# Patient Record
Sex: FEMALE | Race: WHITE | NOT HISPANIC OR LATINO | Employment: OTHER | URBAN - METROPOLITAN AREA
[De-identification: names, ages, dates, MRNs, and addresses within clinical notes are randomized per-mention and may not be internally consistent; named-entity substitution may affect disease eponyms.]

---

## 2022-04-26 ENCOUNTER — OFFICE VISIT (OUTPATIENT)
Dept: INTERNAL MEDICINE | Facility: CLINIC | Age: 72
End: 2022-04-26
Payer: COMMERCIAL

## 2022-04-26 VITALS
SYSTOLIC BLOOD PRESSURE: 136 MMHG | HEART RATE: 88 BPM | DIASTOLIC BLOOD PRESSURE: 84 MMHG | WEIGHT: 228.1 LBS | HEIGHT: 63 IN | BODY MASS INDEX: 40.41 KG/M2 | RESPIRATION RATE: 18 BRPM | TEMPERATURE: 97.1 F | OXYGEN SATURATION: 95 %

## 2022-04-26 DIAGNOSIS — R60.0 BILATERAL LEG EDEMA: ICD-10-CM

## 2022-04-26 DIAGNOSIS — E66.01 CLASS 3 SEVERE OBESITY WITHOUT SERIOUS COMORBIDITY WITH BODY MASS INDEX (BMI) OF 40.0 TO 44.9 IN ADULT, UNSPECIFIED OBESITY TYPE (H): ICD-10-CM

## 2022-04-26 DIAGNOSIS — R21 RASH AND NONSPECIFIC SKIN ERUPTION: ICD-10-CM

## 2022-04-26 DIAGNOSIS — Z12.31 ENCOUNTER FOR SCREENING MAMMOGRAM FOR BREAST CANCER: ICD-10-CM

## 2022-04-26 DIAGNOSIS — Z12.11 SPECIAL SCREENING FOR MALIGNANT NEOPLASMS, COLON: ICD-10-CM

## 2022-04-26 DIAGNOSIS — N93.9 VAGINAL BLEEDING: ICD-10-CM

## 2022-04-26 DIAGNOSIS — E66.813 CLASS 3 SEVERE OBESITY WITHOUT SERIOUS COMORBIDITY WITH BODY MASS INDEX (BMI) OF 40.0 TO 44.9 IN ADULT, UNSPECIFIED OBESITY TYPE (H): ICD-10-CM

## 2022-04-26 DIAGNOSIS — Z00.00 ROUTINE GENERAL MEDICAL EXAMINATION AT A HEALTH CARE FACILITY: Primary | ICD-10-CM

## 2022-04-26 LAB
ERYTHROCYTE [DISTWIDTH] IN BLOOD BY AUTOMATED COUNT: 13.2 % (ref 10–15)
HCT VFR BLD AUTO: 41.1 % (ref 35–47)
HGB BLD-MCNC: 13.1 G/DL (ref 11.7–15.7)
MCH RBC QN AUTO: 30 PG (ref 26.5–33)
MCHC RBC AUTO-ENTMCNC: 31.9 G/DL (ref 31.5–36.5)
MCV RBC AUTO: 94 FL (ref 78–100)
PLATELET # BLD AUTO: 192 10E3/UL (ref 150–450)
RBC # BLD AUTO: 4.37 10E6/UL (ref 3.8–5.2)
WBC # BLD AUTO: 3.2 10E3/UL (ref 4–11)

## 2022-04-26 PROCEDURE — 80053 COMPREHEN METABOLIC PANEL: CPT | Performed by: INTERNAL MEDICINE

## 2022-04-26 PROCEDURE — 84443 ASSAY THYROID STIM HORMONE: CPT | Performed by: INTERNAL MEDICINE

## 2022-04-26 PROCEDURE — 80061 LIPID PANEL: CPT | Performed by: INTERNAL MEDICINE

## 2022-04-26 PROCEDURE — 85027 COMPLETE CBC AUTOMATED: CPT | Performed by: INTERNAL MEDICINE

## 2022-04-26 PROCEDURE — 99213 OFFICE O/P EST LOW 20 MIN: CPT | Mod: 25 | Performed by: INTERNAL MEDICINE

## 2022-04-26 PROCEDURE — 36415 COLL VENOUS BLD VENIPUNCTURE: CPT | Performed by: INTERNAL MEDICINE

## 2022-04-26 PROCEDURE — 99387 INIT PM E/M NEW PAT 65+ YRS: CPT | Performed by: INTERNAL MEDICINE

## 2022-04-26 RX ORDER — HYDROCHLOROTHIAZIDE 12.5 MG/1
12.5 CAPSULE ORAL EVERY MORNING
Qty: 30 CAPSULE | Refills: 1 | Status: SHIPPED | OUTPATIENT
Start: 2022-04-26 | End: 2022-11-18

## 2022-04-26 RX ORDER — HYDROCORTISONE 2.5 %
CREAM (GRAM) TOPICAL 2 TIMES DAILY
Qty: 30 G | Refills: 1 | Status: SHIPPED | OUTPATIENT
Start: 2022-04-26 | End: 2023-07-20

## 2022-04-26 ASSESSMENT — ENCOUNTER SYMPTOMS
BREAST MASS: 0
ABDOMINAL PAIN: 1
CHILLS: 1
HEADACHES: 1
ARTHRALGIAS: 1
HEMATURIA: 1
EYE PAIN: 1
MYALGIAS: 1
WEAKNESS: 1

## 2022-04-26 ASSESSMENT — ACTIVITIES OF DAILY LIVING (ADL): CURRENT_FUNCTION: TELEPHONE REQUIRES ASSISTANCE

## 2022-04-26 NOTE — PROGRESS NOTES
Dr Gordillo's note    Patient's instructions / PLAN:                                                        Plan:  1. hydrochlorathiazide 12.5 mg every morning for swollen legs  2. Pelvic ultrasound -- To schedule this test you may call Scheduling center at [phone]    3. Schedule appointment with the OBGYN after the ultrasound   4. Hydrocortisone cream twice a day to the rash on the back  5. Referral to dermatology   6.  Labs today - [address]   7. Mammogram ( please call [phone] to schedule it)   8. The following vaccines are recommended for you. Please check with your insurance about coverage. Please check with your doctor in [place] and find out which vaccine you had. Some insurances cover better if you have these vaccines at the pharmacy:  -- Prevnar 13 ( pneumonia vaccine)  -- Pneumovax 23 ( different pneumonia vaccine ) - it will be done 1 year after the Prevnar vaccine   -- Tetanus vaccine with whooping cough  -- Shingerix vaccine - the newest vaccine for shingles   -- flu vaccine          ASSESSMENT & PLAN:                                                      (Z00.00) Routine general medical examination at a health care facility  (primary encounter diagnosis)  Comment:   Plan: CBC with platelets, Comprehensive metabolic         panel, Lipid panel reflex to direct LDL         Fasting, TSH with free T4 reflex            (N93.9) Vaginal bleeding  Comment:   Plan: CBC with platelets, Comprehensive metabolic         panel, Lipid panel reflex to direct LDL         Fasting, TSH with free T4 reflex, US Pelvic         Complete with Transvaginal            (R60.0) Bilateral leg edema  Comment:   We discussed about the new meds, advantages and potential side effects. The patient will read also the info from the pharmacy and call back if questions.   Plan: CBC with platelets, Comprehensive metabolic         panel, TSH with free T4 reflex,         hydrochlorothiazide (MICROZIDE) 12.5 MG capsule            (R21)  Rash and nonspecific skin eruption  Comment:   Plan: hydrocortisone 2.5 % cream, Adult Dermatology         Referral            (Z12.31) Encounter for screening mammogram for breast cancer  Comment:   Plan: MA Screening Digital Bilateral            (Z12.11) Special screening for malignant neoplasms, colon  Comment:   Plan: COLOGUAAPPLE([employer])          (E66.01,  Z68.41) Class 3 severe obesity without serious comorbidity with body mass index (BMI) of 40.0 to 44.9 in adult, unspecified obesity type (H)  Comment:   Plan:  we discussed about diet ( reducing calories intake) and increasing the exercise              Chief Complaint:                                                        Annual exam  Due to language barrier, an  was present on the phone  during the history-taking and subsequent discussion (and for part of the physical exam) with this patient.     SUBJECTIVE:                                                    History of present illness     We reviewed the chronic medical problems as above.   I reviewed the recent tests results in Epic     Last visit to the doctor was in [place] in 2019    She c/o of itching on the back     Family memebr shows a picture with rash on the upper back     ROS:      See below       PMHx: - reviewed  History reviewed. No pertinent past medical history.   Patient denies    PSHx: reviewed  History reviewed. No pertinent surgical history.   Patient denies any surgery    Soc Hx: No daily alcohol, no smoking  Social History     Socioeconomic History     Marital status: Single     Spouse name: Not on file     Number of children: Not on file     Years of education: Not on file     Highest education level: Not on file   Occupational History     Not on file   Tobacco Use     Smoking status: Never Smoker     Smokeless tobacco: Never Used   Vaping Use     Vaping Use: Never used   Substance and Sexual Activity     Alcohol use: Never     Drug use: Never     Sexual activity: Never  "  Other Topics Concern     Not on file   Social History Narrative     Not on file     Social Determinants of Health     Financial Resource Strain: Not on file   Food Insecurity: Not on file   Transportation Needs: Not on file   Physical Activity: Not on file   Stress: Not on file   Social Connections: Not on file   Intimate Partner Violence: Not on file   Housing Stability: Not on file        Fam Hx: reviewed  History reviewed. No pertinent family history.      Screening: reviewed      All: reviewed    Meds: reviewed  No current outpatient medications on file.       OBJECTIVE:                                                    Physical Exam :   Blood pressure 136/84, pulse 88, temperature 97.1  F (36.2  C), temperature source Tympanic, resp. rate 18, height 1.6 m (5' 3\"), weight 103.5 kg (228 lb 1.6 oz), SpO2 95 %.     NAD, appears comfortable  Skin clear, no rashes  Neck: supple, no JVD,  no thyroidmegaly  Lymph nodes non palpable in the cervical, supraclavicular axillaries,   Chest: clear to auscultation with good respiratory effort  Cardiac: S1S2, RRR, no mgr appreciated  Abdomen: soft, not tender, not distended, audible bowel sound, no hepatosplenomegaly, no palpable masses, no abdominal bruits  Extremities: no cyanosis, clubbing or edema.   Neuro: A, Ox3, no focal signs.          Nelli Gordillo MD  Internal Medicine         SUBJECTIVE:   Nereyda Rosa is a 72 year old female who presents for Preventive Visit.      Patient has been advised of split billing requirements and indicates understanding: Yes  Are you in the first 12 months of your Medicare coverage?  No    Healthy Habits:     In general, how would you rate your overall health?  Good    Frequency of exercise:  2-3 days/week    Duration of exercise:  N/A    Do you usually eat at least 4 servings of fruit and vegetables a day, include whole grains    & fiber and avoid regularly eating high fat or \"junk\" foods?  No    Taking medications regularly:  Yes    " Medication side effects:  None    Ability to successfully perform activities of daily living:  Telephone requires assistance    Home Safety:  No safety concerns identified    Hearing Impairment:  Difficulty understanding soft or whispered speech    In the past 6 months, have you been bothered by leaking of urine?  No    In general, how would you rate your overall mental or emotional health?  Excellent      PHQ-2 Total Score: 0    Additional concerns today:  No    Do you feel safe in your environment? Yes    Have you ever done Advance Care Planning? (For example, a Health Directive, POLST, or a discussion with a medical provider or your loved ones about your wishes): No, advance care planning information given to patient to review.  Patient declined advance care planning discussion at this time.       Fall risk  Fallen 2 or more times in the past year?: No  Any fall with injury in the past year?: No  Cognitive Screening LANGUAGE BARRIER  1) Repeat 3 items (Leader, Season, Table)    2) Clock draw: LANGUAGE BARRIER  3) 3 item recall: Recalls 1 object   Results: .    Mini-CogTM Copyright FLO Hernandez. Licensed by the author for use in [facility]; reprinted with permission (jessi@[facility]). All rights reserved.      Do you have sleep apnea, excessive snoring or daytime drowsiness?: yes    Reviewed and updated as needed this visit by clinical staff                    Reviewed and updated as needed this visit by Provider                   Social History     Tobacco Use     Smoking status: Not on file     Smokeless tobacco: Not on file   Substance Use Topics     Alcohol use: Not on file         No flowsheet data found.            Current providers sharing in care for this patient include:   Patient Care Team:  No Ref-Primary, Physician as PCP - General    The following health maintenance items are reviewed in Epic and correct as of today:  Health Maintenance Due   Topic Date Due     DEXA  Never done     ANNUAL REVIEW  OF HM ORDERS  Never done     ADVANCE CARE PLANNING  Never done     MAMMO SCREENING  Never done     COVID-19 Vaccine (1) Never done     COLORECTAL CANCER SCREENING  Never done     HEPATITIS C SCREENING  Never done     DTAP/TDAP/TD IMMUNIZATION (1 - Tdap) Never done     LIPID  Never done     ZOSTER IMMUNIZATION (1 of 2) Never done     MEDICARE ANNUAL WELLNESS VISIT  Never done     FALL RISK ASSESSMENT  Never done     Pneumococcal Vaccine: 65+ Years (1 of 1 - PPSV23) Never done     INFLUENZA VACCINE (1) Never done     PHQ-2 (once per calendar year)  Never done     Labs reviewed in EPIC          Review of Systems   Constitutional: Positive for chills.   Eyes: Positive for pain.   Cardiovascular: Positive for peripheral edema.   Gastrointestinal: Positive for abdominal pain.   Breasts:  Negative for tenderness, breast mass and discharge.   Genitourinary: Positive for hematuria, pelvic pain and vaginal bleeding. Negative for vaginal discharge.   Musculoskeletal: Positive for arthralgias and myalgias.   Skin: Positive for rash.   Neurological: Positive for weakness and headaches.         Patient has been advised of split billing requirements and indicates understanding: Yes At the check in, at the      COUNSELING:  Reviewed preventive health counseling, as reflected in patient instructions       Regular exercise       Healthy diet/nutrition    There is no height or weight on file to calculate BMI.    Weight management plan: Discussed healthy diet and exercise guidelines    She has no history on file for tobacco use.      Appropriate preventive services were discussed with this patient, including applicable screening as appropriate for cardiovascular disease, diabetes, osteopenia/osteoporosis, and glaucoma.  As appropriate for age/gender, discussed screening for colorectal cancer, prostate cancer, breast cancer, and cervical cancer. Checklist reviewing preventive services available has been given to the  patient.    Reviewed patients plan of care and provided an AVS. The Basic Care Plan (routine screening as documented in Health Maintenance) for Nereyda meets the Care Plan requirement. This Care Plan has been established and reviewed with the Patient.    Counseling Resources:  ATP IV Guidelines  Pooled Cohorts Equation Calculator  Breast Cancer Risk Calculator  Breast Cancer: Medication to Reduce Risk  FRAX Risk Assessment  ICSI Preventive Guidelines  Dietary Guidelines for Americans, 2010  USDA's MyPlate  ASA Prophylaxis  Lung CA Screening    Nelli Conner MD  [facility]    Identified Health Risks:

## 2022-04-26 NOTE — PATIENT INSTRUCTIONS
Plan:  1. hydrochlorathiazide 12.5 mg every morning for swollen legs  2. Pelvic ultrasound -- To schedule this test you may call Scheduling center at [phone]    3. Schedule appointment with the OBGYN after the ultrasound   4. Hydrocortisone cream twice a day to the rash on the back  5. Referral to dermatology   6.  Labs today - [address]   7. Mammogram ( please call [phone] to schedule it)   8. The following vaccines are recommended for you. Please check with your insurance about coverage. Please check with your doctor in [place] and find out which vaccine you had. Some insurances cover better if you have these vaccines at the pharmacy:  -- Prevnar 13 ( pneumonia vaccine)  -- Pneumovax 23 ( different pneumonia vaccine ) - it will be done 1 year after the Prevnar vaccine   -- Tetanus vaccine with whooping cough  -- Shingerix vaccine - the newest vaccine for shingles   -- flu vaccine

## 2022-04-27 LAB
ALBUMIN SERPL-MCNC: 3.8 G/DL (ref 3.4–5)
ALP SERPL-CCNC: 84 U/L (ref 40–150)
ALT SERPL W P-5'-P-CCNC: 19 U/L (ref 0–50)
ANION GAP SERPL CALCULATED.3IONS-SCNC: 4 MMOL/L (ref 3–14)
AST SERPL W P-5'-P-CCNC: 17 U/L (ref 0–45)
BILIRUB SERPL-MCNC: 0.4 MG/DL (ref 0.2–1.3)
BUN SERPL-MCNC: 16 MG/DL (ref 7–30)
CALCIUM SERPL-MCNC: 9 MG/DL (ref 8.5–10.1)
CHLORIDE BLD-SCNC: 106 MMOL/L (ref 94–109)
CHOLEST SERPL-MCNC: 198 MG/DL
CO2 SERPL-SCNC: 28 MMOL/L (ref 20–32)
CREAT SERPL-MCNC: 0.69 MG/DL (ref 0.52–1.04)
FASTING STATUS PATIENT QL REPORTED: YES
GFR SERPL CREATININE-BSD FRML MDRD: >90 ML/MIN/1.73M2
GLUCOSE BLD-MCNC: 116 MG/DL (ref 70–99)
HDLC SERPL-MCNC: 53 MG/DL
LDLC SERPL CALC-MCNC: 126 MG/DL
NONHDLC SERPL-MCNC: 145 MG/DL
POTASSIUM BLD-SCNC: 4 MMOL/L (ref 3.4–5.3)
PROT SERPL-MCNC: 7.4 G/DL (ref 6.8–8.8)
SODIUM SERPL-SCNC: 138 MMOL/L (ref 133–144)
TRIGL SERPL-MCNC: 96 MG/DL
TSH SERPL DL<=0.005 MIU/L-ACNC: 0.92 MU/L (ref 0.4–4)

## 2022-05-25 LAB — NONINV COLON CA DNA+OCC BLD SCRN STL QL: NEGATIVE

## 2022-07-07 NOTE — PROGRESS NOTES
[facility] Note - EP    Encounter Date: Jul 8, 2022    Dermatology Problem List:    # Xerosis cutis  - Triamcinolone 0.1% cream BID PRN + sensitive skin care techniques  # Notalgia paresthetica  - Sarna Q6H PRN + exercises and possibly PT    ____________________________________________    Assessment & Plan:     # Xerosis cutis with mild atopic dermatitis  Chronic, uncomplicated, expected to improve with sensitive skin care routine and TCS PRN.  - counseled on etiology and natural history of condition  - counseled regarding care of xerotic, sensitive, and/or dermatitic/eczematous skin - see handout in AVS  - start triamcinolone 0.1% cream BID to most pruritic areas, tapering with improvement    # Notalgia Paresthetica  - Counseled regarding unclear etiology, chronic but benign nature, and natural history of condition  - Sarna Q6H PRN to pruritic area between scapulae  - Reviewed exercises which reinforce good posture 5-10 minutes per day for 3 weeks  - Recommended exploring physical therapy, which is known to help with this condition, and she will discuss with her PCP    Procedures Performed:   None    Follow-up: 1 year or sooner PRN for new or changing skin concerns.    Scribe Disclosure:  I, Leslie Rosendo, am serving as a scribe to document services personally performed by Jones Haley MD based on data collection and the provider's statements to me.     Staffed with Dr. Haley.    Bob Dickinson MD  Medicine/Dermatology PGY-2  *[phone]    Staff Physician Comments:   I saw and evaluated the patient with the resident and I agree with the assessment and plan.  I was present for the examination. I have made edits if needed.    Jones Haley MD  Staff Dermatologist and Dermatopathologist  , Department of Dermatology   ____________________________________________    CC: Rash (All over body.)      HPI:  Ms. Nereyda Rosa is a(n) extremely pleasant 72 year old female who  presents today as a new patient for a rash.    Belarusian  utilized throughout, and patient's daughter (bilingual) also present to assist with interpretation, history, and review of AVS instructions.    - Pruritus started 10-11/2022 in center of upper back, then spread to radial flexor surface of bilateral wrists (L>R). Also less involved are popliteal fossae, abdomen. Itch starts first, then rash appears. Notes also dry and flaky skin.  - Notes that she moved to the area from [place] 7/2021, this was her first winter in [place]  - Skin care routine: hot showers daily, washes whole body surface with soap, unsure what soap she uses, uses vaseline to whole body once daily before bed though usually showers in the morning  - Has tried treating pruritic areas with hydrocortisone 2.5% cream from PCP which has been helpful  - Never had anything like this happen before, no similar symptoms in close contacts, no recent changes to bathing or laundry products, clothing, environmental exposures     The patient denies any painful, bleeding, or nonhealing lesions, or any new or changing moles.    Patient is otherwise feeling well, without additional skin concerns.     Labs Reviewed:  N/A    Physical Exam:  Vitals: There were no vitals taken for this visit.  SKIN: Total skin excluding the undergarment areas was performed. The exam included the head/face, neck, both arms, chest, back, abdomen, both legs, digits and/or nails.   - Erythema and lichenification bilateral medial surface of flexor wrists  - Excoriations without notable lesions otherwise on center upper back at midpoint between scapulae  - Excoriations, xerosis, light scaling to bilateral calves, abdomen, upper arms  - No other lesions of concern on areas examined.     Medications:  Current Outpatient Medications   Medication     hydrochlorothiazide (MICROZIDE) 12.5 MG capsule     hydrocortisone 2.5 % cream     No current facility-administered medications for this  visit.      Past Medical History:   There is no problem list on file for this patient.    No past medical history on file.    CC Nelli Conner MD  [address] [place] [address] on close of this encounter.    This note has been created using voice recognition software; while it has been reviewed, some errors may persist.

## 2022-07-08 ENCOUNTER — OFFICE VISIT (OUTPATIENT)
Dept: FAMILY MEDICINE | Facility: CLINIC | Age: 72
End: 2022-07-08
Attending: INTERNAL MEDICINE
Payer: COMMERCIAL

## 2022-07-08 DIAGNOSIS — L85.3 XEROSIS CUTIS: ICD-10-CM

## 2022-07-08 DIAGNOSIS — R21 RASH AND NONSPECIFIC SKIN ERUPTION: ICD-10-CM

## 2022-07-08 DIAGNOSIS — R20.2 NOTALGIA PARESTHETICA: Primary | ICD-10-CM

## 2022-07-08 PROCEDURE — 99204 OFFICE O/P NEW MOD 45 MIN: CPT | Mod: GC | Performed by: DERMATOLOGY

## 2022-07-08 RX ORDER — TRIAMCINOLONE ACETONIDE 1 MG/G
CREAM TOPICAL 2 TIMES DAILY
Qty: 453.6 G | Refills: 4 | Status: SHIPPED | OUTPATIENT
Start: 2022-07-08 | End: 2023-07-20

## 2022-07-08 ASSESSMENT — PAIN SCALES - GENERAL: PAINLEVEL: MILD PAIN (2)

## 2022-07-08 NOTE — PATIENT INSTRUCTIONS
---------------------------------------    Notalgia Paresthetica  This is a very common condition that is characterized by itching, tingling or an altered sense of touch on one side of the back, most frequently the left shoulder blade area. Sometimes this itch can occur come-and-go; other times it is constant. The cause of this is unclear, but it is thought that it is due to the sensory nerves in the back sending out itchy signals, perhaps as a result of poor posture which lead to abnormal stretching of the nerves.  Anti-itch creams, like Sarna, or numbing creams, like pramoxine or Sarna Sensitive, can help to make the sensations go away. Sometimes prescriptions are necessary.  Exercises which reinforce good posture (like the those below) are also helpful. You can do these exercises at home by squeezing your shoulder blades together and stretching your chest (see pictures below). These exercises should be done for 5-10 minutes per day for 3 weeks. Try as well to maintain good posture while walking and sitting during your normal life activities.  Your primary care doctor can refer you to physical therapy to help with this as well.    -------------------------------------------------------------------    Dry Skin    What is dry skin?  Common skin problem  Can be worse during the winter   Affects all ages  Occurs in people with or without other skin problems    What does it look like?  Fine lines in the skin become more visible   Rough feeling skin   Flaky skin  Most common on the arms and legs  Skin can become cracked, especially on the hands and feet    What are some problems caused by dry skin?   Itching  Rubbing or scratching can cause thickened, rough skin patches  Cracks in skin can be painful  Red, itchy, scaly skin (called eczema) can occur  Yellow crusting or pus could be signs of an infection    What causes dry skin?  A lack of water in the top layer of the skin  Too much soapy water,  hot water, or harsh  chemicals  Aging and sun damage    How do I treat dry skin?  Shower or bathe daily for under ten minutes with lukewarm water and mild soap.  Pat yourself dry with a towel gently and leave your skin slightly damp.  Use moisturizing cream or ointment right away.  Avoid lotions.    What kind of mild soap should I be using?  Camay , Dove , Tone , Neutrogena , Purpose , or Oil of Olay   A non-detergent cleanser, like Cetaphil , can be used.    What should I stay away from?  Scented soaps   Bath oils    What moisturizers should I be using?  Cetaphil Cream,CeraVe Cream, Vanicream, Aquaphilic, Eucerin, Aquaphor, or Vaseline   Always apply after showering or bathing.  Reapply throughout the day, if possible.  If dry skin affects your hands, always reapply after handwashing.    What else should I know?  Using a humidifier during winter months may help.  If dry skin gets worse or if eczema develops, a steroid cream may be needed.      Recommendations for dry skin and dermatitis   1. Bathe or shower daily in lukewarm water  2. Use a gentle non-soap detergent cleanser  - Soaps are alkaline (which can irritate sensitive skin) and remove natural moisturizing factors   - Recommended products, in no particular order, include:   - Bars:    - Aveeno Moisturizing Bar    - Cetaphil Gentle Cleansing Bar    - Dove Sensitive Skin Unscented Beauty Bar    - Olay Ultra Moisture Bar   - Liquid Cleansers:    - Aquanil Cleanser    - CeraVe Hydrating Cleanser    - Cetaphil Gentle Skin Cleanser  - Avoid scented soaps or bath additives unless your doctor tells you otherwise  - Focus on washing the face, underarms, and underwear areas; other sites usually do not need frequent washing  3. Rinse off thoroughly, then pat dry until skin is slightly damp  4. Apply moisturizer to damp skin within 3-5 minutes of exiting the bath/shower  - Recommended products, in no particular order, include:   - Lotions (thinner/lighter, but may be less effective)    -  AmLactin Cerapeutic Restoring Body Lotion    - CeraVe Facial Moisturizing Lotion (AM and/or PM)    - Lubriderm Advanced Therapy Lotion   - Creams (thicker, likely the best balance of effectiveness and feel)    - AmLactin Ultra Hydrating Body Cream    - Aveeno Eczema Therapy Moisturizing Cream    - Aveeno Eczema Therapy Itch Relief Balm    - CeraVe Itch Relief Moisturizing Cream   - Ointments (thickest)    - Vaseline  5. If prescribed a topical steroid medication, this may be applied before or after the moisturizer (whichever order you prefer)  6. Reapply moisturizer one or two additional times throughout the day when dry skin is present; once this improves, reduce to daily or every other day as needed to prevent recurrence  7. If dry skin or dermatitis is present on the hands, keep moisturizer near the sink and apply after washing and drying your hands  8. A humidifier may be helpful during the winter months (when ambient humidity is very low)

## 2022-07-08 NOTE — LETTER
7/8/2022         RE: Nereyda Rosa  [address]  [facility] [address]        Dear Colleague,    Thank you for referring your patient, Nereyda Rosa, to the [facility] [place]. Please see a copy of my visit note below.    [facility] Dermatology Clinic Note - EP    Encounter Date: Jul 8, 2022    Dermatology Problem List:    # Xerosis cutis  - Triamcinolone 0.1% cream BID PRN + sensitive skin care techniques  # Notalgia paresthetica  - Sarna Q6H PRN + exercises and possibly PT    ____________________________________________    Assessment & Plan:     # Xerosis cutis with mild atopic dermatitis  Chronic, uncomplicated, expected to improve with sensitive skin care routine and TCS PRN.  - counseled on etiology and natural history of condition  - counseled regarding care of xerotic, sensitive, and/or dermatitic/eczematous skin - see handout in AVS  - start triamcinolone 0.1% cream BID to most pruritic areas, tapering with improvement    # Notalgia Paresthetica  - Counseled regarding unclear etiology, chronic but benign nature, and natural history of condition  - Sarna Q6H PRN to pruritic area between scapulae  - Reviewed exercises which reinforce good posture 5-10 minutes per day for 3 weeks  - Recommended exploring physical therapy, which is known to help with this condition, and she will discuss with her PCP    Procedures Performed:   None    Follow-up: 1 year or sooner PRN for new or changing skin concerns.    Scribe Disclosure:  I, Leslie Robbins, am serving as a scribe to document services personally performed by Jones Haley MD based on data collection and the provider's statements to me.     Staffed with Dr. Haley.    Bob Dickinson MD  Medicine/Dermatology PGY-2  *[phone]    Staff Physician Comments:   I saw and evaluated the patient with the resident and I agree with the assessment and plan.  I was present for the examination. I have made edits if needed.    Jones  Aramis Haley MD  Staff Dermatologist and Dermatopathologist  , Department of Dermatology   ____________________________________________    CC: Rash (All over body.)      HPI:  Ms. Nereyda Rosa is a(n) extremely pleasant 72 year old female who presents today as a new patient for a rash.    Zula  utilized throughout, and patient's daughter (bilingual) also present to assist with interpretation, history, and review of AVS instructions.    - Pruritus started 10-11/2022 in center of upper back, then spread to radial flexor surface of bilateral wrists (L>R). Also less involved are popliteal fossae, abdomen. Itch starts first, then rash appears. Notes also dry and flaky skin.  - Notes that she moved to the area from [place] 7/2021, this was her first winter in [place]  - Skin care routine: hot showers daily, washes whole body surface with soap, unsure what soap she uses, uses vaseline to whole body once daily before bed though usually showers in the morning  - Has tried treating pruritic areas with hydrocortisone 2.5% cream from PCP which has been helpful  - Never had anything like this happen before, no similar symptoms in close contacts, no recent changes to bathing or laundry products, clothing, environmental exposures     The patient denies any painful, bleeding, or nonhealing lesions, or any new or changing moles.    Patient is otherwise feeling well, without additional skin concerns.     Labs Reviewed:  N/A    Physical Exam:  Vitals: There were no vitals taken for this visit.  SKIN: Total skin excluding the undergarment areas was performed. The exam included the head/face, neck, both arms, chest, back, abdomen, both legs, digits and/or nails.   - Erythema and lichenification bilateral medial surface of flexor wrists  - Excoriations without notable lesions otherwise on center upper back at midpoint between scapulae  - Excoriations, xerosis, light scaling to bilateral calves, abdomen,  upper arms  - No other lesions of concern on areas examined.     Medications:  Current Outpatient Medications   Medication     hydrochlorothiazide (MICROZIDE) 12.5 MG capsule     hydrocortisone 2.5 % cream     No current facility-administered medications for this visit.      Past Medical History:   There is no problem list on file for this patient.    No past medical history on file.    CC Nelli Conner MD  [address] [place] [address] on close of this encounter.    This note has been created using voice recognition software; while it has been reviewed, some errors may persist.       Again, thank you for allowing me to participate in the care of your patient.        Sincerely,        Jones Haley MD

## 2022-08-08 ENCOUNTER — TELEPHONE (OUTPATIENT)
Dept: INTERNAL MEDICINE | Facility: CLINIC | Age: 72
End: 2022-08-08

## 2022-08-08 NOTE — TELEPHONE ENCOUNTER
Patient scheduled for phone visit tomorrow.   Phone visit is not appropriate for this patient  She can come to the clinic at 7:10 or reschedule the appointment for a different day IN OFFICE

## 2022-08-09 ENCOUNTER — OFFICE VISIT (OUTPATIENT)
Dept: INTERNAL MEDICINE | Facility: CLINIC | Age: 72
End: 2022-08-09
Payer: COMMERCIAL

## 2022-08-09 VITALS
HEIGHT: 63 IN | BODY MASS INDEX: 40.73 KG/M2 | WEIGHT: 229.9 LBS | SYSTOLIC BLOOD PRESSURE: 138 MMHG | TEMPERATURE: 97.5 F | RESPIRATION RATE: 18 BRPM | OXYGEN SATURATION: 98 % | DIASTOLIC BLOOD PRESSURE: 84 MMHG | HEART RATE: 91 BPM

## 2022-08-09 DIAGNOSIS — R73.9 BLOOD SUGAR INCREASED: ICD-10-CM

## 2022-08-09 DIAGNOSIS — R60.0 BILATERAL LEG EDEMA: ICD-10-CM

## 2022-08-09 DIAGNOSIS — H57.9 EYE EXAM ABNORMAL: Primary | ICD-10-CM

## 2022-08-09 LAB
ANION GAP SERPL CALCULATED.3IONS-SCNC: 8 MMOL/L (ref 3–14)
BUN SERPL-MCNC: 22 MG/DL (ref 7–30)
CALCIUM SERPL-MCNC: 9.3 MG/DL (ref 8.5–10.1)
CHLORIDE BLD-SCNC: 106 MMOL/L (ref 94–109)
CO2 SERPL-SCNC: 25 MMOL/L (ref 20–32)
CREAT SERPL-MCNC: 0.78 MG/DL (ref 0.52–1.04)
GFR SERPL CREATININE-BSD FRML MDRD: 79 ML/MIN/1.73M2
GLUCOSE BLD-MCNC: 97 MG/DL (ref 70–99)
HBA1C MFR BLD: 6 % (ref 0–5.6)
NT-PROBNP SERPL-MCNC: 52 PG/ML (ref 0–125)
POTASSIUM BLD-SCNC: 4.2 MMOL/L (ref 3.4–5.3)
SODIUM SERPL-SCNC: 139 MMOL/L (ref 133–144)

## 2022-08-09 PROCEDURE — 99214 OFFICE O/P EST MOD 30 MIN: CPT | Performed by: INTERNAL MEDICINE

## 2022-08-09 PROCEDURE — 83880 ASSAY OF NATRIURETIC PEPTIDE: CPT | Performed by: INTERNAL MEDICINE

## 2022-08-09 PROCEDURE — 80048 BASIC METABOLIC PNL TOTAL CA: CPT | Performed by: INTERNAL MEDICINE

## 2022-08-09 PROCEDURE — 36415 COLL VENOUS BLD VENIPUNCTURE: CPT | Performed by: INTERNAL MEDICINE

## 2022-08-09 PROCEDURE — 83036 HEMOGLOBIN GLYCOSYLATED A1C: CPT | Performed by: INTERNAL MEDICINE

## 2022-08-09 PROCEDURE — 82043 UR ALBUMIN QUANTITATIVE: CPT | Performed by: INTERNAL MEDICINE

## 2022-08-09 RX ORDER — FUROSEMIDE 20 MG
20 TABLET ORAL DAILY
Qty: 30 TABLET | Refills: 0 | Status: SHIPPED | OUTPATIENT
Start: 2022-08-09 | End: 2023-07-20

## 2022-08-09 NOTE — PATIENT INSTRUCTIONS
Plan:  1. Eye doctor referral   2.  Labs today - [address]   3. Start Furosemide 20 mg daily - for swollen legs  4. Echocardiogram -- To schedule this test please call [facility] at: [phone]   5. You need to schedule a follow up appointment in 2-3 weeks for follow up swollen legs ( dr Gordillo's schedule has no openings)

## 2022-08-09 NOTE — PROGRESS NOTES
Patient's instructions / PLAN:                                                        Plan:  1. Eye doctor referral   2.  Labs today - [address]   3. Start Furosemide 20 mg daily - for swollen legs  4. Echocardiogram -- To schedule this test please call [facility] at: [phone]   5. You need to schedule a follow up appointment in 2-3 weeks for follow up swollen legs ( dr Gordillo's schedule has no openings)       ASSESSMENT & PLAN:                                                      (H57.9) Eye exam abnormal  (primary encounter diagnosis)  Comment:   Plan: Adult Eye Referral            (R60.0) Bilateral leg edema  Comment: We discussed about the new meds, advantages and potential side effects. The patient will read also the info from the pharmacy and call back if questions.   Plan: furosemide (LASIX) 20 MG tablet, Echocardiogram        Complete, Basic metabolic panel, Albumin Random        Urine Quantitative with Creat Ratio, Hemoglobin        A1c, BNP-N terminal pro            (R73.09) Blood sugar increased  Comment:   Plan: Hemoglobin A1c               Chief Complaint:                                                        Due to language barrier, an  on the phone was present during the history-taking and subsequent discussion (and for part of the physical exam) with this patient.       SUBJECTIVE:                                                    History of present illness     She needs exam for citizenship -- I told her she needs to find a doctor that does immigration exams. I printed 14 pages list w the immigration doctors in [place]     She noticed a white spot on the R eye. Referral to eye doctor     Labs April -- Discussed      Leg edema      ROS:                                                      ROS: negative for fever, chills, cough, wheezes, chest pain, shortness of breath, vomiting, abdominal pain, leg swelling       OBJECTIVE:                                                    Physical  "Exam :    Blood pressure 138/84, pulse 91, temperature 97.5  F (36.4  C), temperature source Tympanic, resp. rate 18, height 1.6 m (5' 3\"), weight 104.3 kg (229 lb 14.4 oz), SpO2 98 %.   NAD, appears comfortable  Skin: no rashes   HEENT: white spot on the R eye  Chest: clear to auscultation bilaterally, good respiratory effort  Heart: S1 S2, RRR, no mgr appreciated  Abdomen: soft, not tender,  Extremities: + 2 edema  Neurologic: A, Ox3, no focal signs appreciated    PMHx: reviewed  History reviewed. No pertinent past medical history.   PSHx: reviewed  History reviewed. No pertinent surgical history.     Meds: reviewed  Current Outpatient Medications   Medication Sig Dispense Refill     camphor-menthol (DERMASARRA) 0.5-0.5 % external lotion Apply 1 mL topically every 6 hours as needed for skin care Use to itchy area at the top of your back as needed every 6 hours. 222 mL 4     hydrochlorothiazide (MICROZIDE) 12.5 MG capsule Take 1 capsule (12.5 mg) by mouth every morning 30 capsule 1     hydrocortisone 2.5 % cream Apply topically 2 times daily 30 g 1     triamcinolone (KENALOG) 0.1 % external cream Apply topically 2 times daily Apply to itchiest, red, inflamed areas up to twice daily. Decrease frequency of use as your itch improves. You do not need to use this regularly if you do not have any highly problematic areas. Do NOT use this on your face. 453.6 g 4       Soc Hx: reviewed  Fam Hx: reviewed          Nelli Gordillo MD  Internal Medicine      "

## 2022-08-10 LAB
CREAT UR-MCNC: 85 MG/DL
MICROALBUMIN UR-MCNC: <5 MG/L
MICROALBUMIN/CREAT UR: NORMAL MG/G{CREAT}

## 2022-09-13 ENCOUNTER — TRANSFERRED RECORDS (OUTPATIENT)
Dept: HEALTH INFORMATION MANAGEMENT | Facility: CLINIC | Age: 72
End: 2022-09-13

## 2022-10-06 ENCOUNTER — HOSPITAL ENCOUNTER (EMERGENCY)
Facility: CLINIC | Age: 72
Discharge: HOME | End: 2022-10-06
Attending: EMERGENCY MEDICINE | Admitting: EMERGENCY MEDICINE
Payer: COMMERCIAL

## 2022-10-06 DIAGNOSIS — L50.9 HIVES: ICD-10-CM

## 2022-10-06 PROCEDURE — 99283 EMERGENCY DEPT VISIT LOW MDM: CPT

## 2022-10-06 RX ORDER — PREDNISONE 20 MG/1
40 TABLET ORAL DAILY
Qty: 10 TABLET | Refills: 0 | Status: SHIPPED | OUTPATIENT
Start: 2022-10-06 | End: 2022-10-11

## 2022-10-06 RX ORDER — DIPHENHYDRAMINE HCL 25 MG
25-50 TABLET ORAL EVERY 6 HOURS PRN
Qty: 30 TABLET | Refills: 0 | Status: SHIPPED | OUTPATIENT
Start: 2022-10-06 | End: 2023-07-20

## 2022-10-06 ASSESSMENT — ENCOUNTER SYMPTOMS
SHORTNESS OF BREATH: 0
ROS SKIN COMMENTS: ITCHINESS

## 2022-10-06 NOTE — ED TRIAGE NOTES
Patient reports she has had all over itching of her skin since yesterday, denies SOB or breathing issues. Patients states this has happened before.      Triage Assessment     Row Name 10/06/22 103       Triage Assessment (Adult)    Airway WDL WDL       Respiratory WDL    Respiratory WDL WDL       Skin Circulation/Temperature WDL    Skin Circulation/Temperature WDL WDL       Cardiac WDL    Cardiac WDL WDL       Peripheral/Neurovascular WDL    Peripheral Neurovascular WDL WDL       Cognitive/Neuro/Behavioral WDL    Cognitive/Neuro/Behavioral WDL WDL

## 2022-10-06 NOTE — ED PROVIDER NOTES
History     Chief Complaint:  Rash      The history is limited by a language barrier. A  was used (Wallisian, daughter).      Nereyda Rosa is a 72 year old female who presents with rash. The patient has had an itchy rash over her whole body since yesterday, most notably over her chest area and scalp. The daughter presented a picture of the hives.  She has tried creams for her symptoms in the past without relief.    Review of Systems   Respiratory: Negative for shortness of breath.    Skin: Positive for rash.        itchiness   All other systems reviewed and are negative.    Allergies:  No known allergies    Medications:    camphor-menthol   furosemide   hydrochlorothiazide  hydrocortisone  triamcinolone     Past Medical History:    Patient denies past medical history    Social History:  Patient presents with daughter  Speaks Wallisian  Physical Exam       Physical Exam  Constitutional: Alert, attentive  HENT:    Nose normal.    Posterior pharynx shows no edema   Normal midline uvula   No peritonsillar erythema or swelling   No sublingual induration, swelling or tenderness   No trismus   Normal dentition without gingival swelling or caries   Able to extend neck without discomfort   Tolerating secretions and able to breathe supine with ease  Eyes: EOM are normal. Pupils are equal, round, and reactive to light.   CV: regular rate and rhythm; no murmurs, rubs or gallups  Chest: Effort normal and breath sounds normal.   GI:  There is no tenderness. No distension. Normal bowel sounds  MSK: Normal range of motion.   Neurological: Alert, attentive  Skin: Skin is warm and dry.   Faint urticaria to forearms (declines to disrobe)      Emergency Department Course     Emergency Department Course:    Reviewed:  I reviewed nursing notes, vitals, past medical history and Care Everywhere    Assessments:  1151 I obtained history and examined the patient as noted above.     Disposition:  The patient was discharged to  home.     Impression & Plan      Medical Decision Making:  This is a very pleasant 72 year old female who presented with generalized urticaria.  The precipitant appears to be unclear.  There is no oropharyngeal swelling, respiratory distress, or GI symptoms to suggest anaphylaxis.  I have recommended benadryl and prednisone given the broad distribution. Will plan for total of 5 days of prednisone, benadryl prn itching/rash, and primary care follow up in 2-3 days.  I advised the patient to return for worse itching, oropharyngeal swelling, difficulty breathing, or for any other concerning symptoms.        Diagnosis:    ICD-10-CM    1. Hives  L50.9        Discharge Medications:  New Prescriptions    DIPHENHYDRAMINE (BENADRYL) 25 MG TABLET    Take 1-2 tablets (25-50 mg) by mouth every 6 hours as needed for itching or allergies    PREDNISONE (DELTASONE) 20 MG TABLET    Take 2 tablets (40 mg) by mouth daily for 5 days     Scribe Disclosure:  Hung FIERRO, am serving as a scribe at 11:52 AM on 10/6/2022 to document services personally performed by Aramis Blake MD based on my observations and the provider's statements to me.      Aramis Blake MD  10/06/22 0646

## 2022-10-07 ENCOUNTER — TELEPHONE (OUTPATIENT)
Dept: INTERNAL MEDICINE | Facility: CLINIC | Age: 72
End: 2022-10-07

## 2022-10-07 NOTE — TELEPHONE ENCOUNTER
Pull up prevail/ cane offset/ bath bench with back order received via fax. Form in your mailbox to be signed.

## 2022-10-10 ENCOUNTER — TELEPHONE (OUTPATIENT)
Dept: INTERNAL MEDICINE | Facility: CLINIC | Age: 72
End: 2022-10-10

## 2022-10-10 NOTE — TELEPHONE ENCOUNTER
[facility] - Medical Report; received via fax. Orders/Forms in your mailbox to be signed.    Routed to covering provider.

## 2022-10-11 NOTE — TELEPHONE ENCOUNTER
I do not know this patient and there are no problems listed or history in chart   Will have to wait for Crintea to sign

## 2022-10-11 NOTE — TELEPHONE ENCOUNTER
I do not know this patient   If nursing wants to see if they can complete form talking to patient / family I can sign

## 2022-10-11 NOTE — TELEPHONE ENCOUNTER
Leonarda stated, for  these items there are no diagnosis.   [facility] was called to inquire who ordered these itmes. I spokewith Matthew who said the  did.   Belén was infomred this office will require an appointment before we can sign such orders

## 2022-10-11 NOTE — TELEPHONE ENCOUNTER
Rosamaria calls back. Completed some of the form. Scheduled appt in November with PCP  for the [facility] form.     Form given back to [facility] for Leonarda to fill out, or sign.

## 2022-10-21 NOTE — TELEPHONE ENCOUNTER
Dr Gordillo didn't order the items   Forms not signed  If time allows we will discuss at Nov 4 appointment   Forms in Dr Gordillo hold box

## 2022-11-04 ENCOUNTER — OFFICE VISIT (OUTPATIENT)
Dept: INTERNAL MEDICINE | Facility: CLINIC | Age: 72
End: 2022-11-04
Payer: MEDICARE

## 2022-11-04 ENCOUNTER — ANCILLARY PROCEDURE (OUTPATIENT)
Dept: ULTRASOUND IMAGING | Facility: CLINIC | Age: 72
End: 2022-11-04
Attending: INTERNAL MEDICINE
Payer: MEDICARE

## 2022-11-04 ENCOUNTER — MEDICAL CORRESPONDENCE (OUTPATIENT)
Dept: HEALTH INFORMATION MANAGEMENT | Facility: CLINIC | Age: 72
End: 2022-11-04

## 2022-11-04 VITALS
HEART RATE: 89 BPM | BODY MASS INDEX: 40.45 KG/M2 | TEMPERATURE: 96.7 F | WEIGHT: 228.3 LBS | HEIGHT: 63 IN | OXYGEN SATURATION: 100 % | DIASTOLIC BLOOD PRESSURE: 80 MMHG | SYSTOLIC BLOOD PRESSURE: 134 MMHG | RESPIRATION RATE: 18 BRPM

## 2022-11-04 DIAGNOSIS — N93.9 VAGINAL BLEEDING: ICD-10-CM

## 2022-11-04 DIAGNOSIS — N39.46 MIXED STRESS AND URGE URINARY INCONTINENCE: ICD-10-CM

## 2022-11-04 DIAGNOSIS — G89.29 CHRONIC RIGHT-SIDED LOW BACK PAIN WITH RIGHT-SIDED SCIATICA: ICD-10-CM

## 2022-11-04 DIAGNOSIS — R26.2 DIFFICULTY WALKING: ICD-10-CM

## 2022-11-04 DIAGNOSIS — K64.4 EXTERNAL HEMORRHOIDS: Primary | ICD-10-CM

## 2022-11-04 DIAGNOSIS — J30.1 ALLERGIC RHINITIS DUE TO POLLEN, UNSPECIFIED SEASONALITY: ICD-10-CM

## 2022-11-04 DIAGNOSIS — M54.41 CHRONIC RIGHT-SIDED LOW BACK PAIN WITH RIGHT-SIDED SCIATICA: ICD-10-CM

## 2022-11-04 PROCEDURE — 76856 US EXAM PELVIC COMPLETE: CPT | Performed by: OBSTETRICS & GYNECOLOGY

## 2022-11-04 PROCEDURE — 99214 OFFICE O/P EST MOD 30 MIN: CPT | Performed by: INTERNAL MEDICINE

## 2022-11-04 PROCEDURE — 76830 TRANSVAGINAL US NON-OB: CPT | Performed by: OBSTETRICS & GYNECOLOGY

## 2022-11-04 RX ORDER — CETIRIZINE HYDROCHLORIDE 10 MG/1
10 TABLET ORAL DAILY
Qty: 30 TABLET | Refills: 1 | Status: SHIPPED | OUTPATIENT
Start: 2022-11-04 | End: 2023-07-20

## 2022-11-04 ASSESSMENT — PAIN SCALES - GENERAL: PAINLEVEL: NO PAIN (0)

## 2022-11-04 NOTE — PROGRESS NOTES
"    Patient's instructions / PLAN:                                                        Plan:  1. Vaginal ultrasound -- To schedule this test you may call Scheduling center at [phone]    2. Make an appointment with the OBGYN   3. We will fax the forms  4. Colorectal surgeon referral -- for hemorrhoids   5. Cetirizine 10 mg daily -- for allergies    ASSESSMENT & PLAN:                                                      (K64.4) External hemorrhoids  (primary encounter diagnosis)  Comment:   Plan: Adult Colorectal Surgery  Referral            (R26.2) Difficulty walking  (M54.41,  G89.29) Chronic right-sided low back pain with right-sided sciatica  (N39.46) Mixed stress and urge urinary incontinence  Comment: Secondary low back pain  Plan: Prescription and forms for a cane and bath bench and pull ups     (J30.1) Allergic rhinitis due to pollen, unspecified seasonality  Comment:   Plan: cetirizine (ZYRTEC) 10 MG tablet               Chief Complaint:                                                      Vag bleeding  Hemorrhoid  Diff walking -- assess for patel and bath bench  Ur incontinence  Family member is present.  She speaks English   is present on the phone      SUBJECTIVE:                                                    History of present illness     I asked her about the mammogram and abdomen ultrasound I ordered in April -- she states that she couldn't get appointment.  I explained the patient and the family member the importance of getting an appointment with the OB/GYN and I explained them with the office is.  We should stop there today to make the appointment    \" The illness is getting worse\"   -- the vaginal bleeding persists and it is getting worse     Hemorrhoid -- she feels it after the bowel movements     Diff walking sec LBP and L leg pain   -- she wants a patel and a bath bench -- forms done    Urine incontinence  -- stress and urgency   -- x many years  -- uses 3 pull ups " "a day        LOV: Plan:  1. hydrochlorathiazide 12.5 mg every morning for swollen legs  2. Pelvic ultrasound -- To schedule this test you may call Scheduling center at [phone]    3. Schedule appointment with the OBGYN after the ultrasound   4. Hydrocortisone cream twice a day to the rash on the back  5. Referral to dermatology   6.  Labs today - [address]   7. Mammogram ( please call [phone] to schedule it)   8. The following vaccines are recommended for you. Please check with your insurance about coverage. Please check with your doctor in [place] and find out which vaccine you had. Some insurances cover better if you have these vaccines at the pharmacy:  -- Prevnar 13 ( pneumonia vaccine)  -- Pneumovax 23 ( different pneumonia vaccine ) - it will be done 1 year after the Prevnar vaccine   -- Tetanus vaccine with whooping cough  -- Shingerix vaccine - the newest vaccine for shingles   -- flu vaccine     ROS:                                                      ROS: negative for fever, chills, cough, wheezes, chest pain, shortness of breath, vomiting, abdominal pain, leg swelling     OBJECTIVE:                                                    Physical Exam :    Blood pressure 134/80, pulse 89, temperature (!) 96.7  F (35.9  C), temperature source Tympanic, resp. rate 18, height 1.6 m (5' 3\"), weight 103.6 kg (228 lb 4.8 oz), SpO2 100 %.   NAD, appears comfortable  Skin: no rashes     Neck: supple, no JVD, No thyroidmegaly. Lymph nodes nonpalpable cervical and supraclavicular.  Chest: clear to auscultation bilaterally, good respiratory effort  Heart: S1 S2, RRR, no mgr appreciated  Abdomen: soft, not tender,   Extremities: no edema,   Neurologic: A, Ox3, no focal signs appreciated.     PMHx: reviewed  History reviewed. No pertinent past medical history.   PSHx: reviewed  History reviewed. No pertinent surgical history.     Meds: reviewed  Current Outpatient Medications   Medication Sig Dispense Refill     " camphor-menthol (DERMASARRA) 0.5-0.5 % external lotion Apply 1 mL topically every 6 hours as needed for skin care Use to itchy area at the top of your back as needed every 6 hours. 222 mL 4     diphenhydrAMINE (BENADRYL) 25 MG tablet Take 1-2 tablets (25-50 mg) by mouth every 6 hours as needed for itching or allergies 30 tablet 0     furosemide (LASIX) 20 MG tablet Take 1 tablet (20 mg) by mouth daily 30 tablet 0     hydrochlorothiazide (MICROZIDE) 12.5 MG capsule Take 1 capsule (12.5 mg) by mouth every morning 30 capsule 1     hydrocortisone 2.5 % cream Apply topically 2 times daily 30 g 1     triamcinolone (KENALOG) 0.1 % external cream Apply topically 2 times daily Apply to itchiest, red, inflamed areas up to twice daily. Decrease frequency of use as your itch improves. You do not need to use this regularly if you do not have any highly problematic areas. Do NOT use this on your face. 453.6 g 4       Soc Hx: reviewed  Fam Hx: reviewed      Chart documentation was completed, in part, with [employer] voice-recognition software. Even though reviewed, some grammatical, spelling, and word errors may remain.      Nelli Gordillo MD  Internal Medicine

## 2022-11-04 NOTE — PATIENT INSTRUCTIONS
Plan:  1. Vaginal ultrasound -- To schedule this test you may call Scheduling center at [phone]    2. Make an appointment with the OBGYN   3. We will fax the forms  4. Colorectal surgeon referral -- for hemorrhoids   5. Cetirizine 10 mg daily -- for allergies

## 2022-11-07 ENCOUNTER — TELEPHONE (OUTPATIENT)
Dept: INTERNAL MEDICINE | Facility: CLINIC | Age: 72
End: 2022-11-07

## 2022-11-08 NOTE — TELEPHONE ENCOUNTER
"Called patient with a [facility]  and relayed message from provider.  They stated \"ok\".  Asked if they need assistance and they stated they need an  to call and assist with scheduling.    "

## 2022-11-08 NOTE — TELEPHONE ENCOUNTER
is needed  Please call the patient  At her last office visit, I advised the patient to make an appointment with the OB/GYN doctor, family was present, I explained in details what she needs to do.  No appointment was made.  The recent ultrasound shows thickening of the inner layer of the uterus.  I strongly recommend to make the appointment w OBGYN.  Most likely she will need biopsy to make sure she does not have uterine cancer

## 2022-11-09 NOTE — TELEPHONE ENCOUNTER
Diagnosis, Referred by & from: External Hemorrhoids; referred by dr. Nelli Conner   Appt date: 12/15/2022   NOTES STATUS DETAILS   OFFICE NOTE from referring provider Internal [facility]:  11/4/22, 8/9/22 - [facility] OV with Dr. Conner   OFFICE NOTE from other specialist Care Everywhere [facility]:  9/2/22 - [facility] PV with JOSE Thompson   DISCHARGE SUMMARY from hospital N/A    DISCHARGE REPORT from the ER N/A    OPERATIVE REPORT N/A    MEDICATION LIST Internal    LABS N/A    DIAGNOSTIC PROCEDURES N/A    IMAGING (DISC & REPORT)      ULTRASOUND  (ENDOANAL/ENDORECTAL) Received [facility]:  9/2/22 - US Pelvic     Records Requested  11/09/22    Facility  [facility]  Fax: [phone]   Outcome * 11/9/22 12:22 PM Faxed req to  for images to be pushed to [facility]. - Edna    * 11/17/22 12:57 PM Images received from  and attached to the patient in PACs. - Edna

## 2022-12-08 ENCOUNTER — PATIENT OUTREACH (OUTPATIENT)
Dept: GERIATRIC MEDICINE | Facility: CLINIC | Age: 72
End: 2022-12-08

## 2022-12-08 NOTE — LETTER
December 8, 2022    YANDEL LAY  [address]  [facility] [address]      Dear Yandel:    As a member of [facility] ([facility]), you are provided a care coordinator. I will be your new care coordinator as of 12/1/2022. I will be calling you soon to see how you are doing and determine your needs.    If you have any questions, please feel free to call me at [phone]. If you reach my voice mail, please leave a message and your phone number. If you are hearing impaired, please call the [place] [employer] at 295 or [phone] ([email or web address] relay service).    I look forward to speaking with you soon.    Sincerely,    Yuly Pink RN, N  [phone]  [email or web address] is a health plan that contracts with both Medicare and the [place] Medical Assistance (Medicaid) program to provide benefits of both programs to enrollees. Enrollment in [facility] depends on contract renewal.      [facility]  [id number] DHS Approved ([id number])  [id number] (11/18)

## 2022-12-08 NOTE — PROGRESS NOTES
[facility] Care Coordination Contact    Member became effective with  [facility] on 12/1/2022 with [facility]  Previous Health Plan: [facility] [employer]+  Previous Care System: [facility]  Previous care coordinators name and number: Michaela Denney  [phone]  [email or web address]  Waiver Type: EW  Last MMIS Entry: Date 9/13/22 and Type EW open  MMIS visit date (and type) if different from above: n/a  Services Listed in MMIS:   33 F CASE MGMT 20 F DAY SVC 06 F HOMEMAKER  64 F PCA SUPER 18 F PERSONAL  UTF received: No: Requested on 12/8/22 Emailed, copied CC on email.    Lexii Puckett  Case Management Specialist  [facility]  [phone]

## 2022-12-15 ENCOUNTER — PRE VISIT (OUTPATIENT)
Dept: SURGERY | Facility: CLINIC | Age: 72
End: 2022-12-15

## 2022-12-15 ENCOUNTER — OFFICE VISIT (OUTPATIENT)
Dept: SURGERY | Facility: CLINIC | Age: 72
End: 2022-12-15
Attending: INTERNAL MEDICINE
Payer: MEDICARE

## 2022-12-15 VITALS
HEIGHT: 63 IN | DIASTOLIC BLOOD PRESSURE: 82 MMHG | BODY MASS INDEX: 41.14 KG/M2 | OXYGEN SATURATION: 96 % | SYSTOLIC BLOOD PRESSURE: 145 MMHG | WEIGHT: 232.2 LBS | HEART RATE: 78 BPM

## 2022-12-15 DIAGNOSIS — K64.8 INTERNAL HEMORRHOIDS: ICD-10-CM

## 2022-12-15 DIAGNOSIS — K62.5 RECTAL BLEEDING: Primary | ICD-10-CM

## 2022-12-15 DIAGNOSIS — E66.01 MORBID OBESITY (H): ICD-10-CM

## 2022-12-15 DIAGNOSIS — K59.09 OTHER CONSTIPATION: ICD-10-CM

## 2022-12-15 DIAGNOSIS — K64.4 EXTERNAL HEMORRHOIDS: ICD-10-CM

## 2022-12-15 PROCEDURE — 99203 OFFICE O/P NEW LOW 30 MIN: CPT | Performed by: NURSE PRACTITIONER

## 2022-12-15 ASSESSMENT — PAIN SCALES - GENERAL: PAINLEVEL: NO PAIN (0)

## 2022-12-15 NOTE — NURSING NOTE
"Chief Complaint   Patient presents with     Hemorrhoids     New Patient       Vitals:    12/15/22 1309   BP: (!) 145/82   BP Location: Right arm   Patient Position: Sitting   Cuff Size: Adult Regular   Pulse: 78   SpO2: 96%   Weight: 232 lb 3.2 oz   Height: 5' 3\"       Body mass index is 41.13 kg/m .     Bertram David, EMT- P    "

## 2022-12-15 NOTE — LETTER
"12/15/2022       RE: Nereyda Rosa  [address]  [facility] [address]     Dear Colleague,    Thank you for referring your patient, Nereyda Rosa, to the [facility] COLON AND [facility]IN[facility] [place] [facility]. Please see a copy of my visit note below.    Colon and Rectal Surgery Consult Clinic Note    Date: 12/15/2022     Referring provider:  Diana Gabriela Crintea-Stoian,  [place] [address]     RE: Nereyda Rosa  : 1950  HOLLIE: 12/15/2022    Nereyda Rosa is a very pleasant 72 year old female who presents today for rectal bleeding. Visit was conducted with daughter acting as  per patient request.    HPI:  Abdominal pain/cramping along with rectal bleeding once to twice a month. This started in 2019. The blood is bright red. No anal pain. Bowel movement are soft and twice a day. She does think the bleeding happens with harder stools. No flat or skinny stools. No nausea or vomiting. No weight loss. Has never had a colonoscopy. Cologuard negative 22.    US Pelvic 22:  1. Thickened/prominent endometrial lining for postmenopausal status. Recommend endometrial sampling to rule out endometrial malignancy/hyperplasia.   She has an appointment with OBGYN tomorrow for this.    Physical Examination:  BP (!) 145/82 (BP Location: Right arm, Patient Position: Sitting, Cuff Size: Adult Regular)   Pulse 78   Ht 5' 3\"   Wt 232 lb 3.2 oz   SpO2 96%   BMI 41.13 kg/m    General: alert, oriented, in no acute distress, sitting comfortably  HEENT: mucous membranes moist    Perianal external examination: Exam was chaperoned by Bertram David, EMT-P   Perianal skin: Intact with no excoriation or lichenification.  Lesions: No evidence of an external lesion, nodularity, or induration in the perianal region.  Eversion of buttocks: There was not evidence of an anal fissure. Details: N/A.  Skin tags or " external hemorrhoids: None.  Digital rectal examination: Was performed.   Sphincter tone: Good.  Palpable lesions: No.  Other: None.  Bimanual examination: was not performed    Anoscopy: Was performed.   Hemorrhoids: Yes. Grade 2-3 internal hemorrhoids without active bleeding  Lesions: No    Assessment/Plan: 72 year old female with right lower quadrant abdominal pain and intermittent rectal bleeding for several years.  She does have some internal hemorrhoids and bleeding is often associated with constipation so the certainly could be the source of the bleeding.  However, given the longstanding nature of the bleeding, I did recommend a colonoscopy at this time.  We will start her on a daily fiber supplement and if bleeding persist, could consider hemorrhoid banding. Patient's questions were answered to her stated satisfaction and she is in agreement with this plan.     Medical history:  No past medical history on file.    Surgical history:  No past surgical history on file.    Problem list:  There are no problems to display for this patient.      Medications:  Current Outpatient Medications   Medication Sig Dispense Refill     camphor-menthol (DERMASARRA) 0.5-0.5 % external lotion Apply 1 mL topically every 6 hours as needed for skin care Use to itchy area at the top of your back as needed every 6 hours. 222 mL 4     cetirizine (ZYRTEC) 10 MG tablet Take 1 tablet (10 mg) by mouth daily 30 tablet 1     diphenhydrAMINE (BENADRYL) 25 MG tablet Take 1-2 tablets (25-50 mg) by mouth every 6 hours as needed for itching or allergies 30 tablet 0     furosemide (LASIX) 20 MG tablet Take 1 tablet (20 mg) by mouth daily 30 tablet 0     hydrochlorothiazide (MICROZIDE) 12.5 MG capsule Take 1 capsule (12.5 mg) by mouth every morning 30 capsule 6     hydrocortisone 2.5 % cream Apply topically 2 times daily 30 g 1     triamcinolone (KENALOG) 0.1 % external cream Apply topically 2 times daily Apply to itchiest, red, inflamed areas up  "to twice daily. Decrease frequency of use as your itch improves. You do not need to use this regularly if you do not have any highly problematic areas. Do NOT use this on your face. 453.6 g 4       Allergies:  Allergies   Allergen Reactions     Food      Allergic to salt per pt       Family history:  No family history on file.    Social history:  Social History     Tobacco Use     Smoking status: Never     Passive exposure: Never     Smokeless tobacco: Never   Substance Use Topics     Alcohol use: Never    Marital status: single.    Nursing Notes:   Bertram David, EMT  12/15/2022  1:14 PM  Signed  Chief Complaint   Patient presents with     Hemorrhoids     New Patient       Vitals:    12/15/22 1309   BP: (!) 145/82   BP Location: Right arm   Patient Position: Sitting   Cuff Size: Adult Regular   Pulse: 78   SpO2: 96%   Weight: 232 lb 3.2 oz   Height: 5' 3\"       Body mass index is 41.13 kg/m .     Bertram David, EMT- P       30 minutes spent on the date of encounter (excluding time performing procedures) performing chart review, history and exam, documentation and further activities as noted above with an additional 2 minutes for anoscopy.       This note was created using speech recognition software and may contain unintended word substitutions.        Again, thank you for allowing me to participate in the care of your patient.      Sincerely,    LUCAS Aragon CNP      "

## 2022-12-15 NOTE — PROGRESS NOTES
"Colon and Rectal Surgery Consult Clinic Note    Date: 12/15/2022     Referring provider:  Nelli Conner MD  [address] [place] [address]     RE: Nereyda Rosa  : 1950  HOLLIE: 12/15/2022    Nereyda Rosa is a very pleasant 72 year old female who presents today for rectal bleeding. Visit was conducted with daughter acting as  per patient request.    HPI:  Abdominal pain/cramping along with rectal bleeding once to twice a month. This started in 2019. The blood is bright red. No anal pain. Bowel movement are soft and twice a day. She does think the bleeding happens with harder stools. No flat or skinny stools. No nausea or vomiting. No weight loss. Has never had a colonoscopy. Cologuard negative 22.    US Pelvic 22:  1. Thickened/prominent endometrial lining for postmenopausal status. Recommend endometrial sampling to rule out endometrial malignancy/hyperplasia.   She has an appointment with OBGYN tomorrow for this.    Physical Examination:  BP (!) 145/82 (BP Location: Right arm, Patient Position: Sitting, Cuff Size: Adult Regular)   Pulse 78   Ht 5' 3\"   Wt 232 lb 3.2 oz   SpO2 96%   BMI 41.13 kg/m    General: alert, oriented, in no acute distress, sitting comfortably  HEENT: mucous membranes moist    Perianal external examination: Exam was chaperoned by Bertram David, EMT-P   Perianal skin: Intact with no excoriation or lichenification.  Lesions: No evidence of an external lesion, nodularity, or induration in the perianal region.  Eversion of buttocks: There was not evidence of an anal fissure. Details: N/A.  Skin tags or external hemorrhoids: None.  Digital rectal examination: Was performed.   Sphincter tone: Good.  Palpable lesions: No.  Other: None.  Bimanual examination: was not performed    Anoscopy: Was performed.   Hemorrhoids: Yes. Grade 2-3 internal hemorrhoids without active bleeding  Lesions: No    Assessment/Plan: 72 year old female with right " lower quadrant abdominal pain and intermittent rectal bleeding for several years.  She does have some internal hemorrhoids and bleeding is often associated with constipation so the certainly could be the source of the bleeding.  However, given the longstanding nature of the bleeding, I did recommend a colonoscopy at this time.  We will start her on a daily fiber supplement and if bleeding persist, could consider hemorrhoid banding. Patient's questions were answered to her stated satisfaction and she is in agreement with this plan.     Medical history:  No past medical history on file.    Surgical history:  No past surgical history on file.    Problem list:  There are no problems to display for this patient.      Medications:  Current Outpatient Medications   Medication Sig Dispense Refill     camphor-menthol (DERMASARRA) 0.5-0.5 % external lotion Apply 1 mL topically every 6 hours as needed for skin care Use to itchy area at the top of your back as needed every 6 hours. 222 mL 4     cetirizine (ZYRTEC) 10 MG tablet Take 1 tablet (10 mg) by mouth daily 30 tablet 1     diphenhydrAMINE (BENADRYL) 25 MG tablet Take 1-2 tablets (25-50 mg) by mouth every 6 hours as needed for itching or allergies 30 tablet 0     furosemide (LASIX) 20 MG tablet Take 1 tablet (20 mg) by mouth daily 30 tablet 0     hydrochlorothiazide (MICROZIDE) 12.5 MG capsule Take 1 capsule (12.5 mg) by mouth every morning 30 capsule 6     hydrocortisone 2.5 % cream Apply topically 2 times daily 30 g 1     triamcinolone (KENALOG) 0.1 % external cream Apply topically 2 times daily Apply to itchiest, red, inflamed areas up to twice daily. Decrease frequency of use as your itch improves. You do not need to use this regularly if you do not have any highly problematic areas. Do NOT use this on your face. 453.6 g 4       Allergies:  Allergies   Allergen Reactions     Food      Allergic to salt per pt       Family history:  No family history on file.    Social  "history:  Social History     Tobacco Use     Smoking status: Never     Passive exposure: Never     Smokeless tobacco: Never   Substance Use Topics     Alcohol use: Never    Marital status: single.    Nursing Notes:   Bertram David, EMT  12/15/2022  1:14 PM  Signed  Chief Complaint   Patient presents with     Hemorrhoids     New Patient       Vitals:    12/15/22 1309   BP: (!) 145/82   BP Location: Right arm   Patient Position: Sitting   Cuff Size: Adult Regular   Pulse: 78   SpO2: 96%   Weight: 232 lb 3.2 oz   Height: 5' 3\"       Body mass index is 41.13 kg/m .     Bertram David, EMT- P         30 minutes spent on the date of encounter (excluding time performing procedures) performing chart review, history and exam, documentation and further activities as noted above with an additional 2 minutes for anoscopy.     LUCAS Mayers, NP-C  Colon and Rectal Surgery   [facility]    This note was created using speech recognition software and may contain unintended word substitutions.    "

## 2022-12-16 ENCOUNTER — PATIENT OUTREACH (OUTPATIENT)
Dept: GERIATRIC MEDICINE | Facility: CLINIC | Age: 72
End: 2022-12-16

## 2022-12-16 ENCOUNTER — OFFICE VISIT (OUTPATIENT)
Dept: OBGYN | Facility: CLINIC | Age: 72
End: 2022-12-16
Payer: MEDICARE

## 2022-12-16 VITALS
WEIGHT: 228.3 LBS | DIASTOLIC BLOOD PRESSURE: 78 MMHG | BODY MASS INDEX: 40.44 KG/M2 | HEART RATE: 77 BPM | SYSTOLIC BLOOD PRESSURE: 126 MMHG

## 2022-12-16 DIAGNOSIS — N95.0 POSTMENOPAUSAL BLEEDING: Primary | ICD-10-CM

## 2022-12-16 DIAGNOSIS — R52 PAIN OF RIGHT SIDE OF BODY: ICD-10-CM

## 2022-12-16 PROCEDURE — 99203 OFFICE O/P NEW LOW 30 MIN: CPT | Performed by: OBSTETRICS & GYNECOLOGY

## 2022-12-16 NOTE — PROGRESS NOTES
[facility] Care Coordination Contact    Called member to introduce myself as her new care coordinator and schedule HRA home visit using [facility]  services.  HRA has been scheduled for Friday, Dec 23 at 2:00.   Yuly Dotson RN,  PHN  [facility] Care Coordinator  [phone]

## 2022-12-19 NOTE — PROGRESS NOTES
Postmenopausal bleeding    Ms. Nereyda COTE Moe 72 year old P6 (hx of vaginal deliveries) presents accompanied by her youngest daughter for hx of postmenopausal bleeding and a recent pelvic ultrasound showing a prominent endometrial stripe measuring 12 mm.   She is a Micronesian speaking patient and I was able to converse with her in the Micronesian language.   She describes that her bleeding started about 3 years ago and that they would occur sporadically. She described the bleeding as a fine liquid consistency that was bright red, not heavy in flow as she recalled with her menses when she was premenopausal, and occasional passage of long string clots. She acknowledges concurrent pelvic cramping with the bleeding episodes and states that overall the bleeding lasts for about a day or two at most.   She does not recall exactly when she transitioned into menopause but states that it was years ago. She does not recall being given medication to combat menopausal symptoms ie hx of hormone replacement therapy use.   However, she strongly feels that the onset of her bleeding has something to do with a fall she had while attempting to get into a vehicle. She points to her right side/hip area when she is referencing the fall indicating that she fell on it, and has since had discomfort there radiating to her right groin. It was shortly after this incident that she started to have the vaginal bleeding. She inquires if the two events are related. She also inquires if there can be imaging done on her right side to assess if she has injuries from the fall as she still experiences ongoing soreness/discomfort. She reports taking NSAID like medication from her daughter, who uses it for her dysmenorrhea, to alleviate her soreness/discomfort. She stated she uses 1-2 tablets a day. She also uses the medication to address the cramping she experiences with the spotting, which is located more centrally in the pelvis.       No past medical history on  file.    No past surgical history on file.    Current Outpatient Medications   Medication     camphor-menthol (DERMASARRA) 0.5-0.5 % external lotion     cetirizine (ZYRTEC) 10 MG tablet     diphenhydrAMINE (BENADRYL) 25 MG tablet     furosemide (LASIX) 20 MG tablet     hydrochlorothiazide (MICROZIDE) 12.5 MG capsule     hydrocortisone 2.5 % cream     triamcinolone (KENALOG) 0.1 % external cream     No current facility-administered medications for this visit.       Allergies   Allergen Reactions     Food      Allergic to salt per pt       Social History     Tobacco Use     Smoking status: Never     Passive exposure: Never     Smokeless tobacco: Never   Vaping Use     Vaping Use: Never used   Substance Use Topics     Alcohol use: Never     Drug use: Never       No family history on file.    ROS: 10 point review of systems negative except for pertinent positives stated in the HPI      Exam:   /78 (BP Location: Right arm, Cuff Size: Adult Large)   Pulse 77   Wt 103.6 kg (228 lb 4.8 oz)   BMI 40.44 kg/m    General Appearance: Well nourished, well developed female, NAD, AOx3  Neurological: Mental Status Normal   HEET: Atraumatic, normocephalic  Pelvic: deferred    A/P:   1) Postmenopausal bleeding  -- I reviewed with patient and her daughter the pathophysiology of this condition and common causes  -- I assured them that the fall incident has no relation to the the onset of her postmenopausal bleeding  -- I reviewed the pelvic ultrasound findings with focus on her thickened endometrial stripe   -- I, thoroughly, explained the importance of endometrial sampling to rule out endometrial hyperplasia/malignancy   -- patient and daughter expressed understanding of what was discussed, but express that a female provider/physician be the one to perform the pelvic exam and perform the biopsy  -- when explaining how the biopsy is performed, patient asked if she can have it done under sedation as she is weary of the  discomfort that she would feel from the procedure; I explained that this is feasible but that it would be done in a operating room setting with the need for preoperative clearance, COVID testing negative clearance and also related anesthesia risks; I explained that the procedure is for the most part well tolerated in the clinic setting but that it would have to up to the provider she sees to determine if they will do it in the operating room; patient and daughter express understanding  -- all other questions and concerns were addressed as it pertains to this matter to their satisfaction  -- bleeding precautions reviewed    2) Right sided body pain  -- expressed to patient and daughter to return to their PCP to discuss potential imaging evaluation to determine if there is injury to that area   -- suspect that there may be not, and that this may be just musculoskeletal related discomfort; nevertheless encouraged that they return to their PCP for this issue    Total time spent was 40 minutes on the date of the encounter in chart review, patient visit, review of tests, documentation and counseling on the above medical conditions.    Da Callejas MD  [facility]

## 2022-12-23 ENCOUNTER — TELEPHONE (OUTPATIENT)
Dept: GASTROENTEROLOGY | Facility: CLINIC | Age: 72
End: 2022-12-23

## 2022-12-23 ENCOUNTER — PATIENT OUTREACH (OUTPATIENT)
Dept: GERIATRIC MEDICINE | Facility: CLINIC | Age: 72
End: 2022-12-23

## 2022-12-23 NOTE — TELEPHONE ENCOUNTER
The patient's daughter returned a call regarding a colonoscopy scheduled 1/27 in [place].   The procedure is not scheduled within the [facility] system.   The  is unaware who might have called her.

## 2022-12-27 NOTE — PROGRESS NOTES
[facility] Care Coordination Contact    [facility] Change (Transfer)    Member is new enrollee to [facility] effective 12/23/22 with [facility] health plan. Member transferred from [facility].    No home visit required because this care coordinator (CC) has received all required documentation from the previous CC.    Writer t/c to member with , introduced self as member's new CC. Confirmed with member that the welcome letter with writer's name and contact information has been received.  Reviewed [facility]/Health Risk Assessment (HRA) and POC with member. No changes noted.  Transitional HRA completed. Care Plan Summary updated and reflects current services.  Required referral authorization information communicated to CMS: Yes    Writer reviewed the following with member:    ER visits: Yes -  [facility]  Hospitalizations: No  TCU stays: No  Significant health status changes: None  Falls/Injuries: No  ADL/IADL changes: No  Changes in services: No  Completed 5159 to update new CM and will contact the adult  and PCA company.     Follow-Up Plan: Member informed of future contact, plan to f/u with member with at next regularly scheduled contact.  Contact information shared with member and family, encouraged member to call with any questions or concerns.    Yuly Dotson RN,  PHN  [facility] Care Coordinator  [phone]

## 2023-02-07 ENCOUNTER — TELEPHONE (OUTPATIENT)
Dept: GASTROENTEROLOGY | Facility: CLINIC | Age: 73
End: 2023-02-07
Payer: MEDICARE

## 2023-02-07 NOTE — TELEPHONE ENCOUNTER
"    Screening Questions  BLUE  KIND OF PREP RED  LOCATION [review exclusion criteria] GREEN  SEDATION TYPE        N Are you active on mychart?       Taz Lopez Ordering/Referring Provider?        Medicare What type of coverage do you have?      N Have you had a positive covid test in the last 14 days?   41.13 1. BMI  [BMI 40+ - review exclusion criteria]    Yes  2. Are you able to give consent for your medical care? [IF NO,RN REVIEW]          N  3. Are you taking any prescription pain medications on a routine schedule   (ex narcotics: tramadol, oxycodone, roxicodone, oxycontin,  and percocet)?        N  3a. EXTENDED PREP What kind of prescription?     N 4. Do you have any chemical dependencies such as alcohol, street drugs, or methadone?        **If yes 3- 5 , please schedule with MAC sedation.**          IF YES TO ANY 6 - 10 - HOSPITAL SETTING ONLY.     N 6.   Do you need assistance transferring?     N 7.   Have you had a heart or lung transplant?    N 8.   Are you currently on dialysis?   N 9.   Do you use daily home oxygen?   N 10. Do you take nitroglycerin?   10a. NA If yes, how often?     11. [FEMALES]  NA Are you currently pregnant?    11a. NA If yes, how many weeks? [ Greater than 12 weeks, OR NEEDED]    N 12. Do you have Pulmonary Hypertension? *NEED PAC APPT AT [facility]*     N 13. [review exclusion criteria]  Do you have any implantable devices in your body (pacemaker, defib, LVAD)?    N 14. In the past 6 months, have you had any heart related issues including cardiomyopathy or heart attack?     14a. N If yes, did it require cardiac stenting if so when?     N 15. Have you had a stroke or Transient ischemic attack (TIA - aka  mini stroke ) within 6 months?      N 16. Do you have mod to severe Obstructive Sleep Apnea?  [Hospital only]    N 17. Do you have SEVERE AND UNCONTROLLED asthma? *NEED PAC APPT AT [facility]*     N 18. Are you currently taking any blood thinners?     18a. If yes, inform patient to \"follow " "up w/ ordering provider for bridging instructions.\"    N 19. Do you take the medication Phentermine?    19a. If yes, \"Hold for 7 days before procedure.  Please consult your prescribing provider if you have questions about holding this medication.\"     N  20. Do you have chronic kidney disease?      N  21. Do you have a diagnosis of diabetes?     N  22. On a regular basis do you go 3-5 days between bowel movements?      23. Preferred LOCAL Pharmacy for Pre Prescription    [ LIST ONLY ONE PHARMACY]     [facility] #[id number] [address] [facility] [address]        - CLOSING REMINDERS -    Informed patient they will need an adult    Cannot take any type of public or medical transportation alone    Conscious Sedation- Needs  for 6 hours after the procedure       MAC/General-Needs  for 24 hours after procedure    Pre-Procedure Covid test to be completed [[facility] PCR Testing Required]    Confirmed Nurse will call to complete assessment       - SCHEDULING DETAILS -  N Hospital Setting Required? If yes, what is the exclusion?: JANET Evans  Surgeon    5/19/23  Date of Procedure  Lower Endoscopy [Colonoscopy]  Type of Procedure Scheduled  [facility] Location   [facility] Sent?   BMI  CS Sedation Type     No PAC / Pre-op Required                 "

## 2023-02-10 ENCOUNTER — NURSE TRIAGE (OUTPATIENT)
Dept: NURSING | Facility: CLINIC | Age: 73
End: 2023-02-10
Payer: MEDICARE

## 2023-02-10 DIAGNOSIS — H57.11 ACUTE RIGHT EYE PAIN: Primary | ICD-10-CM

## 2023-02-10 NOTE — TELEPHONE ENCOUNTER
"Pt calling with a [employer] .        S-(situation): severe eye pain, visual problems. Severe dizziness.    B-(background): Right eye pain x 2 years, on and off.    A-(assessment):   Severe right eye pain mostly in the morning, \"can't even open my eyes it feels like it's tearing open.\"  Poor vision. \"There's a spot in my pupil and something is blocking my vision.\"    Severe dizziness. Can still stand and walk but very dizzy,    R-(recommendations):   ED now. Pt states no one will bring her to ED, daughter has to work or might get fired.     Pt requests to schedule an appointment on Monday with PCP. FNA advised that she requires a higher level of care and clinic will have her go to ED. Also, advised that she has an eye issue and would be best seen by an ophthalmologist    FNA advised calling 911, pt states her daughter will call 911 for her.    To care team: Please send a referral to eye clinic. Pt does not know of any eye doctor. Pt states okay to send referral within [facility].    Rola Navarro RN/[place] Nurse Advisor                Reason for Disposition    Severe eye pain    Additional Information    Negative: Followed an eye injury    Negative: Eye pain from chemical in the eye    Negative: Eye pain from foreign body in eye    Negative: Has sinus pain or pressure    Protocols used: EYE PAIN-A-OH      "

## 2023-02-13 NOTE — TELEPHONE ENCOUNTER
[facility]/Ophthalmology [phone]    Attempted to contact patient via [employer] .  No answer at [phone] and voice mailbox not set up yet.    Will try again later.

## 2023-03-06 ENCOUNTER — TELEPHONE (OUTPATIENT)
Dept: OPHTHALMOLOGY | Facility: CLINIC | Age: 73
End: 2023-03-06

## 2023-03-06 NOTE — TELEPHONE ENCOUNTER
[facility] Call Center    Phone Message    May a detailed message be left on voicemail: no    Reason for Call: Appointment Intake    Referring Provider Name: Nelli Conner MD  Diagnosis and/or Symptoms: Urgent 1-2 days referral for Acute right eye pain [H57.11]. Pt had to r/s appt today due to the lack of transportation so we rescheduled to next available on 3/20. But pt is to be seen within 1-2 days. Please check if theres any sooner appt per referral.    Action Taken: Message routed to:  [facility]    Travel Screening: Not Applicable

## 2023-03-06 NOTE — TELEPHONE ENCOUNTER
Nurse triage regarding this problem was on 2-10-23.  Pain has been intermittent for the past two years.  Ok to keep 3-20-23 appointment.

## 2023-03-17 ENCOUNTER — PATIENT OUTREACH (OUTPATIENT)
Dept: GERIATRIC MEDICINE | Facility: CLINIC | Age: 73
End: 2023-03-17
Payer: MEDICARE

## 2023-03-17 NOTE — PROGRESS NOTES
[facility] Care Coordination Contact      [facility] Six-Month Telephone Assessment    6 month telephone assessment completed on 3/16/23 using [employer]  services.    ER visits: No  Hospitalizations: No  TCU stays: No  Significant health status changes: No Changes  Falls/Injuries: No  ADL/IADL changes: No  Changes in services: No    Caregiver Assessment follow up:  Member has an appointment for her eyes.  She has an appointment for a colonoscopy. Discussed completing the prep as instructed.   Nereyda is not sure where she received the shower chair, cane and incontinent supplies.  She would like more incontinent supplies.  Will check with some companies to see who has supplied her in the past and reach out to the PCP.     Goals: See POC in chart for goal progress documentation.     Will see member in 6 months for an annual health risk assessment.   Encouraged member to call CC with any questions or concerns in the meantime.     Yuly Dotson RN,  PHN  [facility] Care Coordinator  [phone]    3/18/23  Confirmed she is getting 96 pull-ups monthly from [facility]. Called the member back and her daughter said it was only this month and the package could be in the delivery room in the apartment building.

## 2023-03-20 ENCOUNTER — OFFICE VISIT (OUTPATIENT)
Dept: OPHTHALMOLOGY | Facility: CLINIC | Age: 73
End: 2023-03-20
Attending: INTERNAL MEDICINE
Payer: MEDICARE

## 2023-03-20 DIAGNOSIS — Z01.01 ENCOUNTER FOR EXAMINATION OF EYES AND VISION WITH ABNORMAL FINDINGS: ICD-10-CM

## 2023-03-20 DIAGNOSIS — H25.813 COMBINED FORMS OF AGE-RELATED CATARACT OF BOTH EYES: ICD-10-CM

## 2023-03-20 DIAGNOSIS — H57.11 ACUTE RIGHT EYE PAIN: ICD-10-CM

## 2023-03-20 DIAGNOSIS — H52.4 PRESBYOPIA: ICD-10-CM

## 2023-03-20 DIAGNOSIS — H18.421 BAND KERATOPATHY OF RIGHT EYE: ICD-10-CM

## 2023-03-20 DIAGNOSIS — H11.001 PTERYGIUM EYE, RIGHT: ICD-10-CM

## 2023-03-20 DIAGNOSIS — E11.9 TYPE 2 DIABETES MELLITUS WITHOUT COMPLICATION, WITHOUT LONG-TERM CURRENT USE OF INSULIN (H): Primary | ICD-10-CM

## 2023-03-20 PROCEDURE — 92004 COMPRE OPH EXAM NEW PT 1/>: CPT | Performed by: OPHTHALMOLOGY

## 2023-03-20 PROCEDURE — 92015 DETERMINE REFRACTIVE STATE: CPT | Mod: GY | Performed by: OPHTHALMOLOGY

## 2023-03-20 ASSESSMENT — REFRACTION_MANIFEST
OD_AXIS: 015
OD_ADD: +2.75
OS_CYLINDER: +3.00
OS_ADD: +2.75
OS_AXIS: 125
OD_CYLINDER: +1.00
OD_SPHERE: +0.75
OS_SPHERE: -0.50

## 2023-03-20 ASSESSMENT — CONF VISUAL FIELD
OD_INFERIOR_NASAL_RESTRICTION: 0
OD_NORMAL: 1
OS_INFERIOR_NASAL_RESTRICTION: 0
OD_SUPERIOR_TEMPORAL_RESTRICTION: 0
OD_SUPERIOR_NASAL_RESTRICTION: 0
OS_SUPERIOR_TEMPORAL_RESTRICTION: 0
METHOD: COUNTING FINGERS
OD_INFERIOR_TEMPORAL_RESTRICTION: 0
OS_NORMAL: 1
OS_SUPERIOR_NASAL_RESTRICTION: 0
OS_INFERIOR_TEMPORAL_RESTRICTION: 0

## 2023-03-20 ASSESSMENT — TONOMETRY
OD_IOP_MMHG: 20
IOP_METHOD: APPLANATION
OS_IOP_MMHG: 21

## 2023-03-20 ASSESSMENT — SLIT LAMP EXAM - LIDS
COMMENTS: 2+ DERMATOCHALASIS
COMMENTS: 2+ DERMATOCHALASIS

## 2023-03-20 ASSESSMENT — VISUAL ACUITY
OD_PH_SC: 20/70
OD_SC: 20/125
OS_SC: 20/100
METHOD: SNELLEN - LINEAR
OS_PH_SC: 20/70

## 2023-03-20 ASSESSMENT — EXTERNAL EXAM - RIGHT EYE: OD_EXAM: NORMAL

## 2023-03-20 ASSESSMENT — CUP TO DISC RATIO
OD_RATIO: 0.3
OS_RATIO: 0.3

## 2023-03-20 ASSESSMENT — EXTERNAL EXAM - LEFT EYE: OS_EXAM: NORMAL

## 2023-03-20 NOTE — PATIENT INSTRUCTIONS
"Glasses prescription given - hold off until visit with cornea specialist   May use artificial tears up to four times a day (like Refresh Optive, Systane Balance, TheraTears, or generic artificial tears are ok. Avoid \"get the red out\" drops).   Possible posterior vitreous detachment (sudden onset large floater and/or flashing lights) both eyes discussed.   Referral to cornea specialist at the [facility].   Call in November 2023 for an appointment in March 2024 for Complete Exam    Dr. Machado [phone]      Muraayadaha daawada laguu qoray - jooji ilaa aad ka booqanayso khabiirka cornea  Waxa laga yaabaa in la isticmaalo ilmada macmalka ah ilaa afar justino howardtii (sida Refresh Optive, Systane Balance, TheraTears, ama jeexjeexyada macmalka ah ee guud waa ok. Iska ilaali dhibcaha \"casaanka rom saar\".  Goosashada vitreous dambe ee suurtogalka ah (bilaw degdeg ah oo sabayn weyn iyo/ama nalal biligleynaya) labada indhood ayaa ka wada hadlay.  U-diritaan ku taqasusay cornea ee [place].  [facility] Noofambar 2023 si aad ballan u qabsato March 2024 si aad u hesho Michaelaixcarleen Machado [phone]    Patient Education   Diabetes weakens the blood vessels all over the body, including the eyes. Damage to the blood vessels in the eyes can cause swelling or bleeding into part of the eye (called the retina). This is called diabetic retinopathy (VIKKI-tin-AH-puh-thee). If not treated, this disease can cause vision loss or blindness.   Symptoms may include blurred or distorted vision, but many people have no symptoms. It's important to see your eye doctor regularly to check for problems.   Early treatment and good control can help protect your vision. Here are the things you can do to help prevent vision loss:      1. Keep your blood sugar levels under tight control.      2. Bring high blood pressure under control.      3. No smoking.      4. Have yearly dilated eye exams.       "

## 2023-03-20 NOTE — LETTER
[facility]  [address]  [place] [address]  Phone: [phone]    03/20/23    Nereyda Rosa  [address]  [place] [address]      To whom it may concern:      Rosamaria Joyner accompanied her mother Nereyda to a necessary medical appointment at [facility], she may be dismissed from work today March 20th, 2023 to provide care for her mother.      Sincerely,      Neftaly Machado MD

## 2023-03-20 NOTE — LETTER
"    3/20/2023         RE: Nereyda Rosa  [address]  [place] [address]        Dear Colleague,    Thank you for referring your patient, Nereyda Rosa, to the [facility]. Please see a copy of my visit note below.     Current Eye Medications:  None. Patient grinds sugar and presses it on right eye, if eyes stuck shut in the morning.      Subjective:  Burning pain comes and goes right eye worse in the morning or focusing on something since the 80's. Feels like something is stuck in right eye. Vision is blurry in distance, but can read up close OK both eyes for last 2 years. Last eye exam was in 2017. Patient states \"eyes roll around some times since child\" Happenns whenever bending over, reading, or gets angry.   Diabetic for about 1 year.    Lab Results   Component Value Date    A1C 6.0 08/09/2022     History of branch from tree falling hitting right eye back in the 80's,      Objective:  See Ophthalmology Exam.       Assessment:  Baseline eye exam in patient with mild-moderate cataracts and visually significant band keratopathy/pterygium right eye.  No obvious diabetic retinopathy.      ICD-10-CM    1. Type 2 diabetes mellitus without complication, without long-term current use of insulin (H)  E11.9       2. Band keratopathy of right eye  H18.421       3. Pterygium eye, right  H11.001       4. Combined forms of age-related cataract, mild-mod, of both eyes  H25.813       5. Encounter for examination of eyes and vision with abnormal findings  Z01.01 EYE EXAM (SIMPLE-NONBILLABLE)      6. Presbyopia  H52.4 REFRACTION      7. Acute right eye pain  H57.11 Adult Eye  Referral           Plan:  Glasses prescription given - hold off until visit with cornea specialist   May use artificial tears up to four times a day (like Refresh Optive, Systane Balance, TheraTears, or generic artificial tears are ok. Avoid \"get the red out\" drops).   Possible posterior vitreous detachment " (sudden onset large floater and/or flashing lights) both eyes discussed.   Referral to cornea specialist at the [facility].   Call in November 2023 for an appointment in March 2024 for Complete Exam    Dr. Machado [phone]               Again, thank you for allowing me to participate in the care of your patient.        Sincerely,        Neftaly Machado MD

## 2023-03-20 NOTE — PROGRESS NOTES
" Current Eye Medications:  None. Patient grinds sugar and presses it on right eye, if eyes stuck shut in the morning.      Subjective:  Burning pain comes and goes right eye worse in the morning or focusing on something since the 80's. Feels like something is stuck in right eye. Vision is blurry in distance, but can read up close OK both eyes for last 2 years. Last eye exam was in 2017. Patient states \"eyes roll around some times since child\" Happenns whenever bending over, reading, or gets angry.   Diabetic for about 1 year.    Lab Results   Component Value Date    A1C 6.0 08/09/2022     History of branch from tree falling hitting right eye back in the 80's,      Objective:  See Ophthalmology Exam.       Assessment:  Baseline eye exam in patient with mild-moderate cataracts and visually significant band keratopathy/pterygium right eye.  No obvious diabetic retinopathy.      ICD-10-CM    1. Type 2 diabetes mellitus without complication, without long-term current use of insulin (H)  E11.9       2. Band keratopathy of right eye  H18.421       3. Pterygium eye, right  H11.001       4. Combined forms of age-related cataract, mild-mod, of both eyes  H25.813       5. Encounter for examination of eyes and vision with abnormal findings  Z01.01 EYE EXAM (SIMPLE-NONBILLABLE)      6. Presbyopia  H52.4 REFRACTION      7. Acute right eye pain  H57.11 Adult Eye  Referral           Plan:  Glasses prescription given - hold off until visit with cornea specialist   May use artificial tears up to four times a day (like Refresh Optive, Systane Balance, TheraTears, or generic artificial tears are ok. Avoid \"get the red out\" drops).   Possible posterior vitreous detachment (sudden onset large floater and/or flashing lights) both eyes discussed.   Referral to cornea specialist at the [facility].   Call in November 2023 for an appointment in March 2024 for Complete Exam    Dr. Machado [phone]           "

## 2023-03-26 PROBLEM — H11.001 PTERYGIUM EYE, RIGHT: Status: ACTIVE | Noted: 2023-03-26

## 2023-03-26 PROBLEM — H18.421 BAND KERATOPATHY OF RIGHT EYE: Status: ACTIVE | Noted: 2023-03-26

## 2023-03-26 PROBLEM — H25.813 COMBINED FORMS OF AGE-RELATED CATARACT OF BOTH EYES: Status: ACTIVE | Noted: 2023-03-26

## 2023-04-20 ENCOUNTER — PATIENT OUTREACH (OUTPATIENT)
Dept: CARE COORDINATION | Facility: CLINIC | Age: 73
End: 2023-04-20
Payer: MEDICARE

## 2023-05-10 RX ORDER — BISACODYL 5 MG/1
TABLET, DELAYED RELEASE ORAL
Qty: 4 TABLET | Refills: 0 | Status: SHIPPED | OUTPATIENT
Start: 2023-05-10 | End: 2023-07-20

## 2023-05-16 ENCOUNTER — TELEPHONE (OUTPATIENT)
Dept: INTERNAL MEDICINE | Facility: CLINIC | Age: 73
End: 2023-05-16
Payer: MEDICARE

## 2023-05-16 NOTE — TELEPHONE ENCOUNTER
Patient Quality Outreach    Patient is due for the following:   Diabetes -  Diabetic Follow-Up Visit    Next Steps:   Schedule a office visit for diabetes follow up.    Type of outreach:    Phone, left message for patient/parent to call back.    Next Steps:  Reach out within 90 days via Letter.    Max number of attempts reached: No. Will try again in 90 days if patient still on fail list.    Questions for provider review:    None           Angie Serra

## 2023-05-19 ENCOUNTER — HOSPITAL ENCOUNTER (OUTPATIENT)
Facility: CLINIC | Age: 73
Discharge: HOME | End: 2023-05-19
Attending: INTERNAL MEDICINE | Admitting: INTERNAL MEDICINE
Payer: MEDICARE

## 2023-05-19 VITALS
DIASTOLIC BLOOD PRESSURE: 54 MMHG | RESPIRATION RATE: 16 BRPM | OXYGEN SATURATION: 95 % | HEART RATE: 72 BPM | SYSTOLIC BLOOD PRESSURE: 112 MMHG

## 2023-05-19 DIAGNOSIS — Z12.11 SPECIAL SCREENING FOR MALIGNANT NEOPLASMS, COLON: Primary | ICD-10-CM

## 2023-05-19 LAB — COLONOSCOPY: NORMAL

## 2023-05-19 PROCEDURE — 250N000011 HC RX IP 250 OP 636: Performed by: INTERNAL MEDICINE

## 2023-05-19 PROCEDURE — G0121 COLON CA SCRN NOT HI RSK IND: HCPCS | Performed by: INTERNAL MEDICINE

## 2023-05-19 PROCEDURE — 45378 DIAGNOSTIC COLONOSCOPY: CPT | Performed by: INTERNAL MEDICINE

## 2023-05-19 PROCEDURE — G0500 MOD SEDAT ENDO SERVICE >5YRS: HCPCS | Performed by: INTERNAL MEDICINE

## 2023-05-19 RX ORDER — NALOXONE HYDROCHLORIDE 0.4 MG/ML
0.4 INJECTION, SOLUTION INTRAMUSCULAR; INTRAVENOUS; SUBCUTANEOUS
Status: DISCONTINUED | OUTPATIENT
Start: 2023-05-19 | End: 2023-05-19 | Stop reason: HOSPADM

## 2023-05-19 RX ORDER — FLUMAZENIL 0.1 MG/ML
0.2 INJECTION, SOLUTION INTRAVENOUS
Status: DISCONTINUED | OUTPATIENT
Start: 2023-05-19 | End: 2023-05-19 | Stop reason: HOSPADM

## 2023-05-19 RX ORDER — ATROPINE SULFATE 0.1 MG/ML
1 INJECTION INTRAVENOUS
Status: DISCONTINUED | OUTPATIENT
Start: 2023-05-19 | End: 2023-05-19 | Stop reason: HOSPADM

## 2023-05-19 RX ORDER — SIMETHICONE 40MG/0.6ML
133 SUSPENSION, DROPS(FINAL DOSAGE FORM)(ML) ORAL
Status: DISCONTINUED | OUTPATIENT
Start: 2023-05-19 | End: 2023-05-19 | Stop reason: HOSPADM

## 2023-05-19 RX ORDER — DIPHENHYDRAMINE HYDROCHLORIDE 50 MG/ML
25-50 INJECTION INTRAMUSCULAR; INTRAVENOUS
Status: DISCONTINUED | OUTPATIENT
Start: 2023-05-19 | End: 2023-05-19 | Stop reason: HOSPADM

## 2023-05-19 RX ORDER — NALOXONE HYDROCHLORIDE 0.4 MG/ML
0.2 INJECTION, SOLUTION INTRAMUSCULAR; INTRAVENOUS; SUBCUTANEOUS
Status: DISCONTINUED | OUTPATIENT
Start: 2023-05-19 | End: 2023-05-19 | Stop reason: HOSPADM

## 2023-05-19 RX ORDER — EPINEPHRINE 1 MG/ML
0.1 INJECTION, SOLUTION INTRAMUSCULAR; SUBCUTANEOUS
Status: DISCONTINUED | OUTPATIENT
Start: 2023-05-19 | End: 2023-05-19 | Stop reason: HOSPADM

## 2023-05-19 RX ORDER — ONDANSETRON 2 MG/ML
4 INJECTION INTRAMUSCULAR; INTRAVENOUS
Status: DISCONTINUED | OUTPATIENT
Start: 2023-05-19 | End: 2023-05-19 | Stop reason: HOSPADM

## 2023-05-19 RX ORDER — FENTANYL CITRATE 50 UG/ML
50-100 INJECTION, SOLUTION INTRAMUSCULAR; INTRAVENOUS EVERY 5 MIN PRN
Status: DISCONTINUED | OUTPATIENT
Start: 2023-05-19 | End: 2023-05-19 | Stop reason: HOSPADM

## 2023-05-19 RX ORDER — LIDOCAINE 40 MG/G
CREAM TOPICAL
Status: DISCONTINUED | OUTPATIENT
Start: 2023-05-19 | End: 2023-05-19 | Stop reason: HOSPADM

## 2023-05-19 RX ADMIN — MIDAZOLAM 1 MG: 1 INJECTION INTRAMUSCULAR; INTRAVENOUS at 10:29

## 2023-05-19 RX ADMIN — FENTANYL CITRATE 50 MCG: 50 INJECTION, SOLUTION INTRAMUSCULAR; INTRAVENOUS at 10:31

## 2023-05-19 RX ADMIN — FENTANYL CITRATE 50 MCG: 50 INJECTION, SOLUTION INTRAMUSCULAR; INTRAVENOUS at 10:35

## 2023-05-19 RX ADMIN — MIDAZOLAM 1 MG: 1 INJECTION INTRAMUSCULAR; INTRAVENOUS at 10:34

## 2023-05-19 ASSESSMENT — ACTIVITIES OF DAILY LIVING (ADL): ADLS_ACUITY_SCORE: 35

## 2023-05-19 NOTE — H&P
Pre-Endoscopy History and Physical     Nereyda Rosa MRN# [id number]   YOB: 1950 Age: 73 year old     Date of Procedure: 5/19/2023  Primary care provider: Nelli Conner  Type of Endoscopy: Colonoscopy with possible biopsy, possible polypectomy  Reason for Procedure: screen  Type of Anesthesia Anticipated: Conscious Sedation    HPI:    Nereyda is a 73 year old female who will be undergoing the above procedure.      A history and physical has been performed. The patient's medications and allergies have been reviewed. The risks and benefits of the procedure and the sedation options and risks were discussed with the patient.  All questions were answered and informed consent was obtained.      She denies a personal or family history of anesthesia complications or bleeding disorders.     Patient Active Problem List   Diagnosis     Morbid obesity (H)     Combined forms of age-related cataract, mild-mod, of both eyes     Pterygium eye, right     Band keratopathy of right eye        Past Medical History:   Diagnosis Date     Hypertension         History reviewed. No pertinent surgical history.    Social History     Tobacco Use     Smoking status: Never     Passive exposure: Never     Smokeless tobacco: Never   Vaping Use     Vaping status: Never Used     Passive vaping exposure: Yes   Substance Use Topics     Alcohol use: Never       Family History   Problem Relation Age of Onset     Glaucoma No family hx of      Macular Degeneration No family hx of        Prior to Admission medications    Medication Sig Start Date End Date Taking? Authorizing Provider   bisacodyl (DULCOLAX) 5 MG EC tablet 2 days prior to procedure, take 2 tablets at 4 pm. 1 day prior to procedure, take 2 tablets at 4 pm. For additional instructions refer to your colonoscopy prep instructions. 5/10/23  Yes Fred Evans MD   polyethylene glycol (GOLYTELY) 236 g suspension 2 days prior at 5pm, mix and drink half of a jug  of Golytely. Drink an 8 oz. glass of Golytely every 15 minutes until half of the jug is gone. Place remainder of Golytely in the refrigerator. 1 day prior at 5 pm, drink the 2nd half of a jug of Golytely bowel prep. 6 hours before your check-in time, drink an 8 oz. glass of Golytely every 15 minutes until half of the 2nd jug of Golytely is gone. Discard remainder of second jug. 5/10/23  Yes Fred Evans MD   camphor-menthol (DERMASARRA) 0.5-0.5 % external lotion Apply 1 mL topically every 6 hours as needed for skin care Use to itchy area at the top of your back as needed every 6 hours. 7/8/22   Jones Haley MD   cetirizine (ZYRTEC) 10 MG tablet Take 1 tablet (10 mg) by mouth daily 11/4/22   Nelli Conner MD   diphenhydrAMINE (BENADRYL) 25 MG tablet Take 1-2 tablets (25-50 mg) by mouth every 6 hours as needed for itching or allergies 10/6/22   Aramis Blake MD   furosemide (LASIX) 20 MG tablet Take 1 tablet (20 mg) by mouth daily 8/9/22   Nelli Conner MD   hydrochlorothiazide (MICROZIDE) 12.5 MG capsule Take 1 capsule (12.5 mg) by mouth every morning 11/18/22   Nelli Conner MD   hydrocortisone 2.5 % cream Apply topically 2 times daily 4/26/22   Nelli Conner MD   triamcinolone (KENALOG) 0.1 % external cream Apply topically 2 times daily Apply to itchiest, red, inflamed areas up to twice daily. Decrease frequency of use as your itch improves. You do not need to use this regularly if you do not have any highly problematic areas. Do NOT use this on your face. 7/8/22   Jones Haley MD       Allergies   Allergen Reactions     Chicken Allergy      Rash on shoulders     Food      Allergic to salt per pt        REVIEW OF SYSTEMS:   5 point ROS negative except as noted above in HPI, including Gen., Resp., CV, GI &  system review.    PHYSICAL EXAM:   There were no vitals taken for this visit. Estimated body mass index is  "40.44 kg/m  as calculated from the following:    Height as of 12/15/22: 1.6 m (5' 3\").    Weight as of 12/16/22: 103.6 kg (228 lb 4.8 oz).   GENERAL APPEARANCE: alert, and oriented  MENTAL STATUS: alert  AIRWAY EXAM: Mallampatti Class I (visualization of the soft palate, fauces, uvula, anterior and posterior pillars)  RESP: lungs clear to auscultation - no rales, rhonchi or wheezes  CV: regular rates and rhythm  DIAGNOSTICS:    Not indicated    IMPRESSION   ASA Class 2 - Mild systemic disease    PLAN:   Plan for Colonoscopy with possible biopsy, possible polypectomy. We discussed the risks, benefits and alternatives and the patient wished to proceed.    The above has been forwarded to the consulting provider.      Signed Electronically by: Fred Evans MD  May 19, 2023          "

## 2023-07-10 ENCOUNTER — PATIENT OUTREACH (OUTPATIENT)
Dept: GERIATRIC MEDICINE | Facility: CLINIC | Age: 73
End: 2023-07-10
Payer: MEDICARE

## 2023-07-10 NOTE — PROGRESS NOTES
[facility] Care Coordination Contact    Called member and adult daughter Rosamaria to schedule annual HRA home visit. HRA has been scheduled for Tues. Aug 1 at 3:30. Will email Jovana Yu for a [place]  to be scheduled. .     Yuly Dotson RN,  N  [facility] Care Coordinator  [phone]    Daughter, Rosamaria called back. Confirmed the address and the member had moved.  Updated the CSM to update TULIO and a 6084 completed and right faxed to [facility].     Yuly Dotson RN,  N  [facility] Care Coordinator  [phone]

## 2023-07-10 NOTE — PROGRESS NOTES
[facility] Care Coordination Contact    Member started going to [facility]  on 7/5/23. She had been going to [facility] . [facility] called and requested a change in authorized services. With an , called the member to verify she wanted to change . [facility] is closer to her home. Also, planned the annual assessment for Aug 1 at 3:30.  Updated the POC and messaged CMS to change companies.     Yuly Dotson RN,  PHN  [facility] Care Coordinator  [phone]

## 2023-07-11 NOTE — PROGRESS NOTES
Member Signature - POC Change:  Per CC, member has made a change to their POC.  Care Plan Change Letter mailed to member for signature with a self-addressed return envelope.     Provider Signature - No POC Shared:  Member indicates that they do not want their POC shared with any [facility] providers.         Lexii Puckett  Case Management Specialist  [facility]  [phone]

## 2023-07-20 ENCOUNTER — TELEPHONE (OUTPATIENT)
Dept: VASCULAR SURGERY | Facility: CLINIC | Age: 73
End: 2023-07-20

## 2023-07-20 ENCOUNTER — OFFICE VISIT (OUTPATIENT)
Dept: INTERNAL MEDICINE | Facility: CLINIC | Age: 73
End: 2023-07-20
Payer: MEDICARE

## 2023-07-20 ENCOUNTER — ANCILLARY PROCEDURE (OUTPATIENT)
Dept: ULTRASOUND IMAGING | Facility: CLINIC | Age: 73
End: 2023-07-20
Payer: MEDICARE

## 2023-07-20 VITALS
DIASTOLIC BLOOD PRESSURE: 74 MMHG | HEIGHT: 63 IN | OXYGEN SATURATION: 96 % | WEIGHT: 227.4 LBS | RESPIRATION RATE: 16 BRPM | TEMPERATURE: 98.3 F | HEART RATE: 89 BPM | BODY MASS INDEX: 40.29 KG/M2 | SYSTOLIC BLOOD PRESSURE: 132 MMHG

## 2023-07-20 DIAGNOSIS — G89.29 CHRONIC PAIN OF BOTH KNEES: Primary | ICD-10-CM

## 2023-07-20 DIAGNOSIS — R32 URINARY INCONTINENCE, UNSPECIFIED TYPE: ICD-10-CM

## 2023-07-20 DIAGNOSIS — R73.03 PREDIABETES: ICD-10-CM

## 2023-07-20 DIAGNOSIS — Z11.59 NEED FOR HEPATITIS C SCREENING TEST: ICD-10-CM

## 2023-07-20 DIAGNOSIS — M25.561 CHRONIC PAIN OF BOTH KNEES: Primary | ICD-10-CM

## 2023-07-20 DIAGNOSIS — M25.562 CHRONIC PAIN OF BOTH KNEES: Primary | ICD-10-CM

## 2023-07-20 DIAGNOSIS — M79.662 PAIN IN BOTH LOWER LEGS: ICD-10-CM

## 2023-07-20 DIAGNOSIS — I83.813 VARICOSE VEINS OF BILATERAL LOWER EXTREMITIES WITH PAIN: ICD-10-CM

## 2023-07-20 DIAGNOSIS — E11.9 TYPE 2 DIABETES MELLITUS WITHOUT COMPLICATION, WITHOUT LONG-TERM CURRENT USE OF INSULIN (H): ICD-10-CM

## 2023-07-20 DIAGNOSIS — Z74.09 MOBILITY IMPAIRED: ICD-10-CM

## 2023-07-20 DIAGNOSIS — E66.01 MORBID OBESITY (H): ICD-10-CM

## 2023-07-20 DIAGNOSIS — M79.661 PAIN IN BOTH LOWER LEGS: ICD-10-CM

## 2023-07-20 LAB
ERYTHROCYTE [DISTWIDTH] IN BLOOD BY AUTOMATED COUNT: 12.5 % (ref 10–15)
HBA1C MFR BLD: 5.9 % (ref 0–5.6)
HCT VFR BLD AUTO: 43.3 % (ref 35–47)
HGB BLD-MCNC: 14 G/DL (ref 11.7–15.7)
MCH RBC QN AUTO: 29.2 PG (ref 26.5–33)
MCHC RBC AUTO-ENTMCNC: 32.3 G/DL (ref 31.5–36.5)
MCV RBC AUTO: 90 FL (ref 78–100)
PLATELET # BLD AUTO: 216 10E3/UL (ref 150–450)
RBC # BLD AUTO: 4.8 10E6/UL (ref 3.8–5.2)
WBC # BLD AUTO: 3.8 10E3/UL (ref 4–11)

## 2023-07-20 PROCEDURE — 85027 COMPLETE CBC AUTOMATED: CPT

## 2023-07-20 PROCEDURE — 83036 HEMOGLOBIN GLYCOSYLATED A1C: CPT

## 2023-07-20 PROCEDURE — 36415 COLL VENOUS BLD VENIPUNCTURE: CPT

## 2023-07-20 PROCEDURE — 80061 LIPID PANEL: CPT

## 2023-07-20 PROCEDURE — 93970 EXTREMITY STUDY: CPT

## 2023-07-20 PROCEDURE — 86803 HEPATITIS C AB TEST: CPT

## 2023-07-20 PROCEDURE — 99214 OFFICE O/P EST MOD 30 MIN: CPT

## 2023-07-20 PROCEDURE — 80048 BASIC METABOLIC PNL TOTAL CA: CPT

## 2023-07-20 ASSESSMENT — PAIN SCALES - GENERAL: PAINLEVEL: WORST PAIN (10)

## 2023-07-20 NOTE — PATIENT INSTRUCTIONS
Recommend wearing compression stockings. I have referred you to a vein clinic for treatment of veins

## 2023-07-20 NOTE — PROGRESS NOTES
Assessment & Plan     Chronic pain of both knees  Patient has chronic bilateral knee pain.  It is especially painful when walking getting up from chairs and moving up and down stairs.  Due to swelling and pain in the posterior calf I did a ultrasound which showed bilateral complex Baker's cyst.  Referral to Ortho was placed as well as physical therapy  - Physical Therapy Referral; Future  - Orthopedic  Referral; Future  - CBC with platelets; Future  - CBC with platelets    Type 2 diabetes mellitus without complication, without long-term current use of insulin (H)  Patient A1c is 5.9.  She is in prediabetic range do not recommend medication but will ask to use a low-carb diet and exercise to control blood sugars  - Lipid panel reflex to direct LDL Non-fasting; Future  - BASIC METABOLIC PANEL; Future  - Lipid panel reflex to direct LDL Non-fasting  - BASIC METABOLIC PANEL    Need for hepatitis C screening test    - Hepatitis C Screen Reflex to HCV RNA Quant and Genotype; Future  - Hepatitis C Screen Reflex to HCV RNA Quant and Genotype    Morbid obesity (H)    - HEMOGLOBIN A1C; Future  - HEMOGLOBIN A1C    Varicose veins of bilateral lower extremities with pain  Patient has painful bilateral varicose veins.  Referral to vein clinic and recommend compression stockings  - Vascular Surgery Referral; Future    Mobility impaired  Patient states that she would like me to per get new house or something along those lines as her bathroom is upstairs and it makes it difficult for her to get there.  Will refer her to social work to see if they could possibly recommend a commode or help with living situation  - Primary Care - Care Coordination Referral; Future    Pain in both lower legs  Pain in posterior calf.  Negative for DVT, Baker's cyst noted bilateral  - US Lower Extremity Venous Duplex Bilateral; Future    Prediabetes  a1c is 5.9  - HEMOGLOBIN A1C; Future  - HEMOGLOBIN A1C    Urinary incontinence, unspecified  "type  Patient has ongoing ongoing incontinence.  States that she is seeing urology prior.  We will reorder diapers for patient  - Incontinence Supplies Order for DME - ONLY FOR DME    BMI:   Estimated body mass index is 40.28 kg/m  as calculated from the following:    Height as of this encounter: 1.6 m (5' 3\").    Weight as of this encounter: 103.1 kg (227 lb 6.4 oz).       Hao Marinelli NP  [facility] Dawn is a 73 year old, presenting for the following health issues:  Pain (She has body pain, started more than 2 months ago. The pain is more on the knees and it is hard to move. It is difficult to go up the stairs. She wants medication to help with her pain.)        7/20/2023     9:37 AM   Additional Questions   Roomed by Tisha Robles MA   Accompanied by Her Daughter & Phone      Pain    History of Present Illness       Back Pain:  She presents for follow up of back pain. Patient's back pain is a recurring problem.  Location of back pain:  Right lower back, right side of neck, right shoulder and right hip  Description of back pain: burning and cramping  Back pain spreads: right knee and right foot    Since patient first noticed back pain, pain is: always present, but gets better and worse  Does back pain interfere with her job:  Not applicable      She eats 2-3 servings of fruits and vegetables daily.She consumes 0 sweetened beverage(s) daily.She exercises with enough effort to increase her heart rate 10 to 19 minutes per day.  She exercises with enough effort to increase her heart rate 3 or less days per week.   She is taking medications regularly.     Swelling below knee anterior   Onset: has been ongoing for years. Has problem going up and down stairs for about 5 months  Characteristics: throbbing pain for two months. Hurts when she is standing, or going to standing position  Aggravating/relieving: tylenol has helped her previously.     Denies history of " "blood clot, denies long car/plane       Review of Systems   Constitutional, HEENT, cardiovascular, pulmonary, GI, , musculoskeletal, neuro, skin, endocrine and psych systems are negative, except as otherwise noted.      Objective    /74 (BP Location: Right arm, Patient Position: Sitting, Cuff Size: Adult Large)   Pulse 89   Temp 98.3  F (36.8  C) (Oral)   Resp 16   Ht 1.6 m (5' 3\")   Wt 103.1 kg (227 lb 6.4 oz)   SpO2 96%   BMI 40.28 kg/m    Body mass index is 40.28 kg/m .  Physical Exam   GENERAL: alert and no distress  EYES: Eyes grossly normal to inspection  RESP: lungs clear to auscultation - no rales, rhonchi or wheezes  BREAST: normal without masses, tenderness or nipple discharge and no palpable axillary masses or adenopathy  CV: regular rate and rhythm, normal S1 S2, no S3 or S4, no murmur, click or rub, no peripheral edema and peripheral pulses strong  MS: no gross musculoskeletal defects noted, no edema, swelling RLE and LLE under knee, varicose veins BLE  SKIN: no suspicious lesions or rashes  NEURO: Normal strength and tone, mentation intact and speech normal  PSYCH: mentation appears normal, affect normal/bright          "

## 2023-07-20 NOTE — LETTER
July 21, 2023      Nereyda Rosa  [address] [place] [address]        Dear ,    We are writing to inform you of your test results.    Your labs indicate that you have prediabetes. I would not recommend medication at this time but you should work on eating a low carb diet, and exercise.     Your cholesterol is elevated, if you are interested in starting a cholesterol lowering medicine I could send one to the pharmacy for you.     All other labs are normal      Resulted Orders   Hepatitis C Screen Reflex to HCV RNA Quant and Genotype   Result Value Ref Range    Hepatitis C Antibody Nonreactive Nonreactive    Narrative    Assay performance characteristics have not been established for newborns, infants, and children.   HEMOGLOBIN A1C   Result Value Ref Range    Hemoglobin A1C 5.9 (H) 0.0 - 5.6 %      Comment:      Normal <5.7%   Prediabetes 5.7-6.4%    Diabetes 6.5% or higher     Note: Adopted from ADA consensus guidelines.   Lipid panel reflex to direct LDL Non-fasting   Result Value Ref Range    Cholesterol 228 (H) <200 mg/dL    Triglycerides 139 <150 mg/dL    Direct Measure HDL 51 >=50 mg/dL    LDL Cholesterol Calculated 149 (H) <=100 mg/dL    Non HDL Cholesterol 177 (H) <130 mg/dL    Narrative    Cholesterol  Desirable:  <200 mg/dL    Triglycerides  Normal:  Less than 150 mg/dL  Borderline High:  150-199 mg/dL  High:  200-499 mg/dL  Very High:  Greater than or equal to 500 mg/dL    Direct Measure HDL  Female:  Greater than or equal to 50 mg/dL   Male:  Greater than or equal to 40 mg/dL    LDL Cholesterol  Desirable:  <100mg/dL  Above Desirable:  100-129 mg/dL   Borderline High:  130-159 mg/dL   High:  160-189 mg/dL   Very High:  >= 190 mg/dL    Non HDL Cholesterol  Desirable:  130 mg/dL  Above Desirable:  130-159 mg/dL  Borderline High:  160-189 mg/dL  High:  190-219 mg/dL  Very High:  Greater than or equal to 220 mg/dL   BASIC METABOLIC PANEL   Result Value Ref Range    Sodium 139 136 - 145 mmol/L     Potassium 4.6 3.4 - 5.3 mmol/L    Chloride 102 98 - 107 mmol/L    Carbon Dioxide (CO2) 28 22 - 29 mmol/L    Anion Gap 9 7 - 15 mmol/L    Urea Nitrogen 20.2 8.0 - 23.0 mg/dL    Creatinine 0.73 0.51 - 0.95 mg/dL    Calcium 9.8 8.8 - 10.2 mg/dL    Glucose 97 70 - 99 mg/dL    GFR Estimate 85 >60 mL/min/1.73m2   CBC with platelets   Result Value Ref Range    WBC Count 3.8 (L) 4.0 - 11.0 10e3/uL    RBC Count 4.80 3.80 - 5.20 10e6/uL    Hemoglobin 14.0 11.7 - 15.7 g/dL    Hematocrit 43.3 35.0 - 47.0 %    MCV 90 78 - 100 fL    MCH 29.2 26.5 - 33.0 pg    MCHC 32.3 31.5 - 36.5 g/dL    RDW 12.5 10.0 - 15.0 %    Platelet Count 216 150 - 450 10e3/uL       If you have any questions or concerns, please call the clinic at the number listed above.       Sincerely,      Hao Marinelli NP

## 2023-07-21 LAB
ANION GAP SERPL CALCULATED.3IONS-SCNC: 9 MMOL/L (ref 7–15)
BUN SERPL-MCNC: 20.2 MG/DL (ref 8–23)
CALCIUM SERPL-MCNC: 9.8 MG/DL (ref 8.8–10.2)
CHLORIDE SERPL-SCNC: 102 MMOL/L (ref 98–107)
CHOLEST SERPL-MCNC: 228 MG/DL
CREAT SERPL-MCNC: 0.73 MG/DL (ref 0.51–0.95)
DEPRECATED HCO3 PLAS-SCNC: 28 MMOL/L (ref 22–29)
GFR SERPL CREATININE-BSD FRML MDRD: 85 ML/MIN/1.73M2
GLUCOSE SERPL-MCNC: 97 MG/DL (ref 70–99)
HCV AB SERPL QL IA: NONREACTIVE
HDLC SERPL-MCNC: 51 MG/DL
LDLC SERPL CALC-MCNC: 149 MG/DL
NONHDLC SERPL-MCNC: 177 MG/DL
POTASSIUM SERPL-SCNC: 4.6 MMOL/L (ref 3.4–5.3)
SODIUM SERPL-SCNC: 139 MMOL/L (ref 136–145)
TRIGL SERPL-MCNC: 139 MG/DL

## 2023-07-25 ENCOUNTER — THERAPY VISIT (OUTPATIENT)
Dept: PHYSICAL THERAPY | Facility: CLINIC | Age: 73
End: 2023-07-25
Payer: MEDICARE

## 2023-07-25 DIAGNOSIS — M25.561 CHRONIC PAIN OF BOTH KNEES: ICD-10-CM

## 2023-07-25 DIAGNOSIS — G89.29 CHRONIC PAIN OF BOTH KNEES: ICD-10-CM

## 2023-07-25 DIAGNOSIS — M25.562 CHRONIC PAIN OF BOTH KNEES: ICD-10-CM

## 2023-07-25 PROCEDURE — 97110 THERAPEUTIC EXERCISES: CPT | Mod: GP | Performed by: SPECIALIST/TECHNOLOGIST

## 2023-07-25 PROCEDURE — 97161 PT EVAL LOW COMPLEX 20 MIN: CPT | Mod: GP | Performed by: SPECIALIST/TECHNOLOGIST

## 2023-07-25 ASSESSMENT — ACTIVITIES OF DAILY LIVING (ADL)
GO UP STAIRS: I AM UNABLE TO DO THE ACTIVITY
SQUAT: I AM UNABLE TO DO THE ACTIVITY
SWELLING: THE SYMPTOM PREVENTS ME FROM ALL DAILY ACTIVITIES
STAND: ACTIVITY IS VERY DIFFICULT
PAIN: THE SYMPTOM PREVENTS ME FROM ALL DAILY ACTIVITIES
WALK: ACTIVITY IS VERY DIFFICULT
HOW_WOULD_YOU_RATE_THE_OVERALL_FUNCTION_OF_YOUR_KNEE_DURING_YOUR_USUAL_DAILY_ACTIVITIES?: SEVERELY ABNORMAL
STIFFNESS: THE SYMPTOM PREVENTS ME FROM ALL DAILY ACTIVITIES
KNEEL ON THE FRONT OF YOUR KNEE: I AM UNABLE TO DO THE ACTIVITY
KNEE_ACTIVITY_OF_DAILY_LIVING_SCORE: 5.71
SIT WITH YOUR KNEE BENT: I AM UNABLE TO DO THE ACTIVITY
LIMPING: THE SYMPTOM PREVENTS ME FROM ALL DAILY ACTIVITIES
AS_A_RESULT_OF_YOUR_KNEE_INJURY,_HOW_WOULD_YOU_RATE_YOUR_CURRENT_LEVEL_OF_DAILY_ACTIVITY?: SEVERELY ABNORMAL
RISE FROM A CHAIR: I AM UNABLE TO DO THE ACTIVITY
HOW_WOULD_YOU_RATE_THE_CURRENT_FUNCTION_OF_YOUR_KNEE_DURING_YOUR_USUAL_DAILY_ACTIVITIES_ON_A_SCALE_FROM_0_TO_100_WITH_100_BEING_YOUR_LEVEL_OF_KNEE_FUNCTION_PRIOR_TO_YOUR_INJURY_AND_0_BEING_THE_INABILITY_TO_PERFORM_ANY_OF_YOUR_USUAL_DAILY_ACTIVITIES?: 0
KNEE_ACTIVITY_OF_DAILY_LIVING_SUM: 4
GO DOWN STAIRS: ACTIVITY IS VERY DIFFICULT
GIVING WAY, BUCKLING OR SHIFTING OF KNEE: THE SYMPTOM PREVENTS ME FROM ALL DAILY ACTIVITIES
RAW_SCORE: 4
WEAKNESS: THE SYMPTOM AFFECTS MY ACTIVITY SEVERELY

## 2023-07-25 NOTE — PROGRESS NOTES
PHYSICAL THERAPY EVALUATION  Type of Visit: Evaluation    See electronic medical record for Abuse and Falls Screening details.    Subjective       Presenting condition or subjective complaint: Knee pain in both knees  Date of onset: 07/20/23    Relevant medical history: High blood pressure; Diabetes   Dates & types of surgery: Gallbladder    Prior diagnostic imaging/testing results: Other; CT scan (Ultrasound)     Prior therapy history for the same diagnosis, illness or injury: No      Prior Level of Function  Transfers: PHYSICAL THERAPY EVALUATION  Type of Visit: Evaluation    See electronic medical record for Abuse and Falls Screening details.    Subjective       Presenting condition or subjective complaint: Knee pain in both knees  Date of onset: 07/20/23    Relevant medical history: High blood pressure; Diabetes   Dates & types of surgery: Gallbladder    Prior diagnostic imaging/testing results: Other; CT scan (Ultrasound)     Prior therapy history for the same diagnosis, illness or injury: No      Prior Level of Function  Transfers: Assistive equipment  Ambulation: Assistive equipment  ADL: Assistive equipment      Living Environment  Social support: With family members   Type of home: [facility]   Stairs to enter the home: No       Ramp: No   Stairs inside the home: Yes 12 Is there a railing: Yes   Help at home: Self Cares (home health aide/personal care attendant, family, etc)  Equipment owned: Straight Cane     Employment: No    Hobbies/Interests: Walking, talking with family    Patient goals for therapy: Walking outside    Pain assessment: Pain present     Objective   KNEE EVALUATION  PAIN: Pain Location: Posterior and anterior knees bilaterally. Has a large bakers cyst behind both knees  INTEGUMENTARY (edema, incisions):  Bilateral bakers cysts  POSTURE: Standing Posture: Rounded shoulders, Forward head, Lordosis decreased, Thoracic kyphosis increased  Sitting Posture: Rounded shoulders, Forward head,  Lordosis decreased, Thoracic kyphosis increased  GAIT:  Weightbearing Status: WBAT  Assistive Device(s): Cane (single end)  Gait Deviations: Antalgic  ROM: AROM WNL  STRENGTH: WNL  SPECIAL TESTS: WNL  PALPATION:  General tenderness around both knees      Assessment & Plan   CLINICAL IMPRESSIONS  Medical Diagnosis: Chronic bilateral knee pain    Treatment Diagnosis: Bilateral knee pain   Impression/Assessment: Patient is a 73 year old female with Bilateral knee complaints.  The following significant findings have been identified: Pain, Impaired gait, Impaired muscle performance, and Decreased activity tolerance. These impairments interfere with their ability to perform self care tasks, recreational activities, household chores, driving , household mobility, and community mobility as compared to previous level of function.     Clinical Decision Making (Complexity):  Clinical Presentation: Stable/Uncomplicated  Clinical Presentation Rationale: based on medical and personal factors listed in PT evaluation  Clinical Decision Making (Complexity): Low complexity    PLAN OF CARE  Treatment Interventions:  Interventions: Manual Therapy, Neuromuscular Re-education, Therapeutic Activity, Therapeutic Exercise, Self-Care/Home Management    Long Term Goals     PT Goal 1  Goal Description: Walk 1 mile without pain or symptoms  Rationale: to maximize safety and independence with performance of ADLs and functional tasks;to maximize safety and independence within the home;to maximize safety and independence within the community;to maximize safety and independence with transportation;to maximize safety and independence with self cares  Target Date: 09/19/23      Frequency of Treatment: 1x/wk  Duration of Treatment: 8 weeks    Recommended Referrals to Other Professionals:   Education Assessment:   Learner/Method: Patient;Family    Risks and benefits of evaluation/treatment have been explained.   Patient/Family/caregiver agrees with  Plan of Care.     Evaluation Time:     PT Eval, Low Complexity Minutes (83683): 15   Present: Yes: Language: [place], ID Number/Identifier: [facility] Staff     Signing Clinician: Nehemiah Sin PT      [facility]                                                                                   OUTPATIENT PHYSICAL THERAPY      PLAN OF TREATMENT FOR OUTPATIENT REHABILITATION   Patient's Last Name, First Name, Nereyda Capellan YOB: 1950   Provider's Name   [facility]   Medical Record No.  [id number]     Onset Date: 07/20/23  Start of Care Date: 07/25/23     Medical Diagnosis:  Chronic bilateral knee pain      PT Treatment Diagnosis:  Bilateral knee pain Plan of Treatment  Frequency/Duration: 1x/wk/ 8 weeks    Certification date from 07/25/23 to 09/19/23         See note for plan of treatment details and functional goals     Nehemiah Sin PT                         I CERTIFY THE NEED FOR THESE SERVICES FURNISHED UNDER        THIS PLAN OF TREATMENT AND WHILE UNDER MY CARE     (Physician attestation of this document indicates review and certification of the therapy plan).                Referring Provider:  Hao Marinelli      Initial Assessment  See Epic Evaluation- Start of Care Date: 07/25/23

## 2023-08-02 ENCOUNTER — PATIENT OUTREACH (OUTPATIENT)
Dept: GERIATRIC MEDICINE | Facility: CLINIC | Age: 73
End: 2023-08-02
Payer: MEDICARE

## 2023-08-02 SDOH — ECONOMIC STABILITY: INCOME INSECURITY: IN THE LAST 12 MONTHS, WAS THERE A TIME WHEN YOU WERE NOT ABLE TO PAY THE MORTGAGE OR RENT ON TIME?: NO

## 2023-08-02 SDOH — HEALTH STABILITY: PHYSICAL HEALTH: ON AVERAGE, HOW MANY MINUTES DO YOU ENGAGE IN EXERCISE AT THIS LEVEL?: 30 MIN

## 2023-08-02 SDOH — HEALTH STABILITY: PHYSICAL HEALTH: ON AVERAGE, HOW MANY DAYS PER WEEK DO YOU ENGAGE IN MODERATE TO STRENUOUS EXERCISE (LIKE A BRISK WALK)?: 7 DAYS

## 2023-08-02 SDOH — ECONOMIC STABILITY: TRANSPORTATION INSECURITY
IN THE PAST 12 MONTHS, HAS THE LACK OF TRANSPORTATION KEPT YOU FROM MEDICAL APPOINTMENTS OR FROM GETTING MEDICATIONS?: NO

## 2023-08-02 SDOH — ECONOMIC STABILITY: TRANSPORTATION INSECURITY
IN THE PAST 12 MONTHS, HAS LACK OF TRANSPORTATION KEPT YOU FROM MEETINGS, WORK, OR FROM GETTING THINGS NEEDED FOR DAILY LIVING?: NO

## 2023-08-02 SDOH — ECONOMIC STABILITY: FOOD INSECURITY: WITHIN THE PAST 12 MONTHS, YOU WORRIED THAT YOUR FOOD WOULD RUN OUT BEFORE YOU GOT MONEY TO BUY MORE.: NEVER TRUE

## 2023-08-02 SDOH — ECONOMIC STABILITY: FOOD INSECURITY: WITHIN THE PAST 12 MONTHS, THE FOOD YOU BOUGHT JUST DIDN'T LAST AND YOU DIDN'T HAVE MONEY TO GET MORE.: NEVER TRUE

## 2023-08-02 ASSESSMENT — LIFESTYLE VARIABLES
AUDIT-C TOTAL SCORE: 0
SKIP TO QUESTIONS 9-10: 1
HOW OFTEN DO YOU HAVE A DRINK CONTAINING ALCOHOL: NEVER
HOW MANY STANDARD DRINKS CONTAINING ALCOHOL DO YOU HAVE ON A TYPICAL DAY: PATIENT DOES NOT DRINK
HOW OFTEN DO YOU HAVE SIX OR MORE DRINKS ON ONE OCCASION: NEVER

## 2023-08-02 ASSESSMENT — SOCIAL DETERMINANTS OF HEALTH (SDOH)
DO YOU BELONG TO ANY CLUBS OR ORGANIZATIONS SUCH AS CHURCH GROUPS UNIONS, FRATERNAL OR ATHLETIC GROUPS, OR SCHOOL GROUPS?: NO
HOW OFTEN DO YOU GET TOGETHER WITH FRIENDS OR RELATIVES?: MORE THAN THREE TIMES A WEEK
HOW HARD IS IT FOR YOU TO PAY FOR THE VERY BASICS LIKE FOOD, HOUSING, MEDICAL CARE, AND HEATING?: NOT VERY HARD
HOW OFTEN DO YOU ATTEND CHURCH OR RELIGIOUS SERVICES?: MORE THAN 4 TIMES PER YEAR
HOW OFTEN DO YOU ATTENT MEETINGS OF THE CLUB OR ORGANIZATION YOU BELONG TO?: 1 TO 4 TIMES PER YEAR
IN A TYPICAL WEEK, HOW MANY TIMES DO YOU TALK ON THE PHONE WITH FAMILY, FRIENDS, OR NEIGHBORS?: MORE THAN THREE TIMES A WEEK

## 2023-08-02 ASSESSMENT — ACTIVITIES OF DAILY LIVING (ADL): DEPENDENT_IADLS:: CLEANING;COOKING;LAUNDRY;SHOPPING;MEAL PREPARATION;MONEY MANAGEMENT;TRANSPORTATION

## 2023-08-02 NOTE — Clinical Note
Alden Gordillo,  I am the CC for this member. I was able to complete the assessment in the home. She would like to go to adult day care 5 x weekly but not meet the requirements. [facility] has her on a waiting list for a 1 bedroom apartment she currently lives in a 3 level town home. Will continue the other services.  Thanks,  Yuly

## 2023-08-02 NOTE — PROGRESS NOTES
[facility] Care Coordination Contact    [facility] Home Visit Assessment     Home visit for Health Risk Assessment with Nereyda Rosa completed on August 1, 2023 with daughter, Rosamaria and Rebecca,     Type of residence:: Private home - stairs  Current living arrangement:: I live in a private home with family     Assessment completed with:: Patient, Care Team Member, Children, Other ()    Current Care Plan  Member currently receiving the following home care services:  None   Member currently receiving the following community resources:    PCA and , ADC    Medication Review  Medication reconciliation completed in Epic: Yes  Medication set-up & administration: Independent-does not set up.  Only takes Tylenol as needed .  Medication Risk Assessment Medication (1 or more, place referral to MTM): N/A: No risk factors identified  MTM Referral Placed: No: No risk factors idenified    Mental/Behavioral Health   Depression Screening:   PHQ-2 Total Score (Adult) - Positive if 3 or more points; Administer PHQ-9 if positive: 0     Mental health DX:: No        Falls Assessment:   Fallen 2 or more times in the past year?: No   Any fall with injury in the past year?: No    ADL/IADL Dependencies:   Dependent ADLs:: Incontinence, Ambulation-cane, Bathing, Grooming  Dependent IADLs:: Cleaning, Cooking, Laundry, Shopping, Meal Preparation, Money Management, Transportation    [facility] sponsored benefits: Shared information re: Silver Sneakers/gym memberships, ASA, Calcium +D.    PCA Assessment completed at visit: Yes Annual PCA assessment indicated 1.75 hours per day of PCA. This is the same as the previous assessment.     Elderly Waiver Eligibility: Yes-will continue on EW    Care Plan & Recommendations: Member is living in a 3 level town home with her daughter. The daughter does not qualify for subsidized housing and must move out of the townhouse she shares with this member.  [facility]  has her on a single bedroom, one level apartment waiting list. Will continue working with [facility] for housing stabilization. Member would also like to go to day care 5 days a week, but does not qualify. DTR completed.  See [facility] for detailed assessment information.    Follow-Up Plan: Member informed of future contact, plan to f/u with member with a 6 month telephone assessment.  Contact information shared with member and family, encouraged member to call with any questions or concerns at any time.    [place] care continuum providers: Please see Snapshot and Care Management Flowsheets for Specific details of care plan.    This CC note routed to PCP, Nelli Conner.    Yuly Dotson, RN,  PHN  [facility] Care Coordinator  [phone]

## 2023-08-09 ENCOUNTER — PATIENT OUTREACH (OUTPATIENT)
Dept: GERIATRIC MEDICINE | Facility: CLINIC | Age: 73
End: 2023-08-09
Payer: MEDICARE

## 2023-08-09 NOTE — PROGRESS NOTES
[facility] Care Coordination Contact    Received after visit chart from care coordinator.  Completed following tasks: Mailed copy of care plan to client, Mailed Safe Medication Disposal , Mailed [facility] Leave Behind Letter, Submitted referrals/auths for hmkg, adc w/rides, and Updated services in Database  , Provider Signature - No POC Shared:  Member indicates that they do not want their POC shared with any EW providers.    [facility]:  Emailed completed PCA assessment to [facility].  Faxed copy of PCA assessment to PCA Agency and mailed copy to member.  Faxed MD Communication to PCP.     Lexii Puckett  Case Management Specialist  [facility]  [phone]

## 2023-08-09 NOTE — LETTER
August 9, 2023    YANDEL LAY  [address] [facility] [address]        Dear Yandel:    At [facility], we re dedicated to improving your health and wellness. Enclosed is the Care Plan developed with you on 8/1/2023. Please review the Care Plan carefully.    As a reminder, during your visit we talked about:  Ways to manage your physical and mental health  Using health care to maintain and improve your health   Your preventive care needs     Remember to contact your care coordinator if you:  Are hospitalized, or plan to be hospitalized   Have a fall    Have a change in your physical or mental health  Need help finding support or services    If you have questions, or don t agree with your Care Plan, call me at [phone]. You can also call me if your needs change. TTY users, call the [place] [employer] at (265) or 1-693.252.4300 (zqwnlu-to-qlndvy relay service).    Sincerely,        Yuly Pink RN, PHN  [phone]  [email or web address]    [id number] accepted    [id number] (07/2022)

## 2023-08-16 ENCOUNTER — PATIENT OUTREACH (OUTPATIENT)
Dept: GERIATRIC MEDICINE | Facility: CLINIC | Age: 73
End: 2023-08-16
Payer: MEDICARE

## 2023-08-16 NOTE — PROGRESS NOTES
[facility] Care Coordination Contact    YANDEL LAY 1950 [facility] PCA: Assess Same PCA:   10/1/2023 3/31/2024 Medically Necessary [phone]    YANDEL LAY 1950 [facility] PCA: Assess Same PCA:   4/1/2024 8/31/2024 Medically Necessary [phone]    Yuly Dotson RN,  PHN  [facility] Coordinator  [phone]

## 2023-08-17 NOTE — TELEPHONE ENCOUNTER
[facility] Care Coordination Contact    2nd Attempt: Signed Letter not received from member, resent per process.    Viviana Lomas  Care Management Specialist  [facility]  [phone]

## 2023-08-24 NOTE — PROGRESS NOTES
Provider Signature - Full Care Plan:  Member indicates that they would like their POC shared with the following EW providers:  [facility]  Letter and full POC faxed to providers for signature.    Lexii Puckett  Case Management Specialist  [facility]  [phone]

## 2023-08-29 ENCOUNTER — OFFICE VISIT (OUTPATIENT)
Dept: INTERNAL MEDICINE | Facility: CLINIC | Age: 73
End: 2023-08-29
Payer: MEDICARE

## 2023-08-29 ENCOUNTER — PATIENT OUTREACH (OUTPATIENT)
Dept: GERIATRIC MEDICINE | Facility: CLINIC | Age: 73
End: 2023-08-29

## 2023-08-29 VITALS
TEMPERATURE: 97.4 F | BODY MASS INDEX: 39.85 KG/M2 | SYSTOLIC BLOOD PRESSURE: 120 MMHG | RESPIRATION RATE: 18 BRPM | WEIGHT: 224.9 LBS | DIASTOLIC BLOOD PRESSURE: 70 MMHG | HEIGHT: 63 IN | HEART RATE: 88 BPM | OXYGEN SATURATION: 95 %

## 2023-08-29 DIAGNOSIS — Z12.31 VISIT FOR SCREENING MAMMOGRAM: ICD-10-CM

## 2023-08-29 DIAGNOSIS — R53.82 CHRONIC FATIGUE: ICD-10-CM

## 2023-08-29 DIAGNOSIS — N39.46 MIXED STRESS AND URGE URINARY INCONTINENCE: ICD-10-CM

## 2023-08-29 DIAGNOSIS — R60.0 BILATERAL LEG EDEMA: ICD-10-CM

## 2023-08-29 DIAGNOSIS — R06.83 SNORES: ICD-10-CM

## 2023-08-29 DIAGNOSIS — Z00.00 ROUTINE GENERAL MEDICAL EXAMINATION AT A HEALTH CARE FACILITY: Primary | ICD-10-CM

## 2023-08-29 DIAGNOSIS — R10.31 RLQ ABDOMINAL PAIN: ICD-10-CM

## 2023-08-29 LAB
TSH SERPL DL<=0.005 MIU/L-ACNC: 1.18 UIU/ML (ref 0.3–4.2)
VIT B12 SERPL-MCNC: 483 PG/ML (ref 232–1245)

## 2023-08-29 PROCEDURE — 99213 OFFICE O/P EST LOW 20 MIN: CPT | Mod: 25 | Performed by: INTERNAL MEDICINE

## 2023-08-29 PROCEDURE — G0438 PPPS, INITIAL VISIT: HCPCS | Performed by: INTERNAL MEDICINE

## 2023-08-29 PROCEDURE — 84443 ASSAY THYROID STIM HORMONE: CPT | Performed by: INTERNAL MEDICINE

## 2023-08-29 PROCEDURE — 36415 COLL VENOUS BLD VENIPUNCTURE: CPT | Performed by: INTERNAL MEDICINE

## 2023-08-29 PROCEDURE — 82607 VITAMIN B-12: CPT | Performed by: INTERNAL MEDICINE

## 2023-08-29 RX ORDER — FUROSEMIDE 20 MG
20 TABLET ORAL DAILY
Qty: 30 TABLET | Refills: 1 | Status: SHIPPED | OUTPATIENT
Start: 2023-08-29 | End: 2024-02-12

## 2023-08-29 ASSESSMENT — ENCOUNTER SYMPTOMS
ABDOMINAL PAIN: 1
HEARTBURN: 1
WEAKNESS: 1
HEADACHES: 1

## 2023-08-29 ASSESSMENT — ACTIVITIES OF DAILY LIVING (ADL)
CURRENT_FUNCTION: SHOPPING REQUIRES ASSISTANCE
CURRENT_FUNCTION: TRANSPORTATION REQUIRES ASSISTANCE
CURRENT_FUNCTION: PREPARING MEALS REQUIRES ASSISTANCE
CURRENT_FUNCTION: LAUNDRY REQUIRES ASSISTANCE
CURRENT_FUNCTION: BATHING REQUIRES ASSISTANCE
CURRENT_FUNCTION: HOUSEWORK REQUIRES ASSISTANCE

## 2023-08-29 ASSESSMENT — PAIN SCALES - GENERAL: PAINLEVEL: NO PAIN (0)

## 2023-08-29 NOTE — LETTER
September 5, 2023      Nereyda Rosa  [address] [facility] [address]        Dear ,    We are writing to inform you of your test results.    These are the recent blood test results.   Your Thyroid level and your vitamin B12 level are in normal range    Resulted Orders   TSH with free T4 reflex   Result Value Ref Range    TSH 1.18 0.30 - 4.20 uIU/mL   Vitamin B12   Result Value Ref Range    Vitamin B12 483 232 - 1,245 pg/mL       If you have any questions or concerns, please call the clinic at the number listed above.       Sincerely,      Nelli Conner MD

## 2023-08-29 NOTE — PATIENT INSTRUCTIONS
Plan:  Your blood sugar and cholesterol are on the high side. Low sugar diet and low cholesterol diet will help  2. Sleep center referral   3.  Labs today - [address]   4. Low salt diet helps the swollen legs   5. Furosemide 20 mg daily for swollen legs  6. Echocardiogram -- To schedule this test please call [facility] at: [phone]   7. Prescription for pull-ups  8. The following vaccines are recommended for you. Please check with your insurance about coverage.  Some insurances cover better if you have these vaccines at the pharmacy:  -- RSV  -- Pneumonia 20   -- Tetanus vaccine with whooping cough  -- Shingerix vaccine - the newest vaccine for shingles   -- flu vaccine    -- Covid   9. Pelvis ultrasound and abdomen CT -- To schedule this test you may call Scheduling center at [phone]   It is the patient responsibility to check with insurance for coverage before you go for the test.

## 2023-08-29 NOTE — PROGRESS NOTES
[facility] Care Coordination Contact    Noted the member was seen in the clinic today. Another order for incontinent pads was placed. When assessed Aug 1 the daughter said they had the pads being delivered to her previous location at the other daughters apartment.   Called [facility] and they do have a current order on file.  In March the order was returned saying she had moved. Updated them with her current address and made aware she is moving again.   Updated the POC.     Yuly Dotson RN,  PHN  [facility] Care Coordinator  [phone]

## 2023-08-29 NOTE — PROGRESS NOTES
Dr Gordillo's note    Patient's instructions / PLAN:                                                        Plan:  Your blood sugar and cholesterol are on the high side. Low sugar diet and low cholesterol diet will help  2. Sleep center referral   3.  Labs today - [address]   4. Low salt diet helps the swollen legs   5. Furosemide 20 mg daily for swollen legs  6. Echocardiogram -- To schedule this test please call [facility] at: [phone]   7. Prescription for pull-ups  8. The following vaccines are recommended for you. Please check with your insurance about coverage.  Some insurances cover better if you have these vaccines at the pharmacy:  -- RSV  -- Pneumonia 20   -- Tetanus vaccine with whooping cough  -- Shingerix vaccine - the newest vaccine for shingles   -- flu vaccine    -- Covid   9. Pelvis ultrasound and abdomen CT -- To schedule this test you may call [facility] center at [phone]   It is the patient responsibility to check with insurance for coverage before you go for the test.         ASSESSMENT & PLAN:                                                      (Z00.00) Routine general medical examination at a health care facility  (primary encounter diagnosis)  Comment:   Plan:     (R53.82) Chronic fatigue  Comment: suspect KRISTEN  Plan: Adult Sleep Eval & Management          Referral, TSH with free T4 reflex, Vitamin B12            (R06.83) Snores  Comment:   Plan: Adult Sleep Eval & Management          Referral            (R60.0) Bilateral leg edema  Comment: new   Plan: furosemide (LASIX) 20 MG tablet, Echocardiogram        Complete            (N39.46) Mixed stress and urge urinary incontinence  Comment:   Plan: Miscellaneous Order for DME - ONLY FOR DME           (R10.31) RLQ abdominal pain  Comment:   Plan: CT Abdomen Pelvis w Contrast, US Pelvic         Complete with Transvaginal            Chief Complaint:                                                        Annual exam  Follow  up chronic medical problems    Family member translates     SUBJECTIVE:                                                    History of present illness     We reviewed the chronic medical problems as above.   I reviewed the recent tests results in Epic     We reviewed the ROS but we will not be able to address all her symptoms and she will schedule future appointment to address her symptoms  The major c/o is feeling very tired and she wants the vitamins to be checked   She snores and she wakes up w dry mouth     Refuses mamm and colon    RLQ pain x 2 -- exam tenderness     ROS:     See below        PMHx: - reviewed  Past Medical History:   Diagnosis Date    Hypertension        PSHx: reviewed  Past Surgical History:   Procedure Laterality Date    COLONOSCOPY N/A 5/19/2023    Procedure: COLONOSCOPY;  Surgeon: Fred Evans MD;  Location:  GI        Soc Hx: No daily alcohol, no smoking  Social History     Socioeconomic History    Marital status: Single     Spouse name: Not on file    Number of children: Not on file    Years of education: Not on file    Highest education level: Not on file   Occupational History    Not on file   Tobacco Use    Smoking status: Never     Passive exposure: Never    Smokeless tobacco: Never   Vaping Use    Vaping Use: Never used   Substance and Sexual Activity    Alcohol use: Never    Drug use: Never    Sexual activity: Not Currently   Other Topics Concern    Not on file   Social History Narrative    Not on file     Social Determinants of Health     Financial Resource Strain: Low Risk  (8/2/2023)    Overall Financial Resource Strain (CARDIA)     Difficulty of Paying Living Expenses: Not very hard   Food Insecurity: No Food Insecurity (8/2/2023)    Hunger Vital Sign     Worried About Running Out of Food in the Last Year: Never true     Ran Out of Food in the Last Year: Never true   Transportation Needs: No Transportation Needs (8/2/2023)    PRAPARE - Transportation     Lack of  "Transportation (Medical): No     Lack of Transportation (Non-Medical): No   Physical Activity: Sufficiently Active (8/2/2023)    Exercise Vital Sign     Days of Exercise per Week: 7 days     Minutes of Exercise per Session: 30 min   Stress: No Stress Concern Present (8/2/2023)    [employer] - Occupational Stress Questionnaire     Feeling of Stress : Not at all   Social Connections: Moderately Integrated (8/2/2023)    Social Connection and Isolation Panel [NHANES]     Frequency of Communication with Friends and Family: More than three times a week     Frequency of Social Gatherings with Friends and Family: More than three times a week     Attends Confucianist Services: More than 4 times per year     Active Member of Clubs or Organizations: No     Attends Club or Organization Meetings: 1 to 4 times per year     Marital Status:    Intimate Partner Violence: Not on file   Housing Stability: Low Risk  (8/2/2023)    Housing Stability Vital Sign     Unable to Pay for Housing in the Last Year: No     Number of Places Lived in the Last Year: 1     Unstable Housing in the Last Year: No        Fam Hx: reviewed  Family History   Problem Relation Age of Onset    Glaucoma No family hx of     Macular Degeneration No family hx of          Screening: reviewed      All: reviewed    Meds: reviewed  No current outpatient medications on file.       OBJECTIVE:                                                    Physical Exam :  Blood pressure 120/70, pulse 88, temperature 97.4  F (36.3  C), temperature source Tympanic, resp. rate 18, height 1.6 m (5' 3\"), weight 102 kg (224 lb 14.4 oz), SpO2 95 %, not currently breastfeeding.     NAD, appears comfortable  Skin clear, no rashes  Neck: supple, no JVD,  no thyroidmegaly  Lymph nodes non palpable in the cervical, supraclavicular axillaries,   Chest: clear to auscultation with good respiratory effort  Cardiac: S1S2, RRR, no mgr appreciated  Abdomen: soft, " "RLQ tender, not distended, audible bowel sound, no hepatosplenomegaly, no palpable masses, no abdominal bruits  Extremities: + 1 edema.   Neuro: A, Ox3, no focal signs.          Nelli Gordillo MD  Internal Medicine         SUBJECTIVE:   Nereyda is a 73 year old who presents for Preventive Visit.      8/29/2023     9:11 AM   Additional Questions   Roomed by Angie Serra       Are you in the first 12 months of your Medicare coverage?  No    Healthy Habits:     In general, how would you rate your overall health?  Excellent    Frequency of exercise:  4-5 days/week    Duration of exercise:  30-45 minutes    Do you usually eat at least 4 servings of fruit and vegetables a day, include whole grains    & fiber and avoid regularly eating high fat or \"junk\" foods?  No    Taking medications regularly:  Yes    Medication side effects:  Muscle aches and Lightheadedness    Ability to successfully perform activities of daily living:  Transportation requires assistance, shopping requires assistance, preparing meals requires assistance, housework requires assistance, bathing requires assistance and laundry requires assistance    Home Safety:  No safety concerns identified    Hearing Impairment:  No hearing concerns    In the past 6 months, have you been bothered by leaking of urine? Yes    In general, how would you rate your overall mental or emotional health?  Excellent    Additional concerns today:  No        Have you ever done Advance Care Planning? (For example, a Health Directive, POLST, or a discussion with a medical provider or your loved ones about your wishes): No, advance care planning information given to patient to review.  Patient declined advance care planning discussion at this time.       Fall risk  Fallen 2 or more times in the past year?: No  Any fall with injury in the past year?: No  Language barrier  Cognitive Screening not done because language barrier. Daughter not concerned about memory      Mini-CogTM " Copyright FLO Hernandez. Licensed by the author for use in [facility]; reprinted with permission (jessi@[facility]). All rights reserved.      Do you have sleep apnea, excessive snoring or daytime drowsiness? : no    Reviewed and updated as needed this visit by clinical staff                  Reviewed and updated as needed this visit by Provider                 Social History     Tobacco Use    Smoking status: Never     Passive exposure: Never    Smokeless tobacco: Never   Substance Use Topics    Alcohol use: Never             4/26/2022    12:47 PM   Alcohol Use   Prescreen: >3 drinks/day or >7 drinks/week? Not Applicable     Do you have a current opioid prescription? No  Do you use any other controlled substances or medications that are not prescribed by a provider? None              Current providers sharing in care for this patient include:   Patient Care Team:  Nelli Conner MD as PCP - General (Internal Medicine)  Nelli Conner MD as Assigned PCP  Nelli Conner MD as Referring Physician (Internal Medicine)  Sejal Gar PA-C as Physician Assistant (Dermatology)  Jones Haley MD as MD (Dermatology)  Yuly Dotson, SVETA as Lead Care Coordinator (Primary Care - CC)  Da Callejas MD as Assigned OBGYN Provider  Airam Bailey OD as MD (Ophthalmology)  Nelli Conner MD as Referring Physician (Internal Medicine)  Neftaly Machado MD as Assigned Surgical Provider    The following health maintenance items are reviewed in Epic and correct as of today:  Health Maintenance   Topic Date Due    DEXA  Never done    DIABETIC FOOT EXAM  Never done    MAMMO SCREENING  Never done    COVID-19 Vaccine (1) Never done    Pneumococcal Vaccine: 65+ Years (1 - PCV) Never done    DTAP/TDAP/TD IMMUNIZATION (1 - Tdap) Never done    ZOSTER IMMUNIZATION (1 of 2) Never done    MEDICARE ANNUAL WELLNESS VISIT  04/26/2023     "MICROALBUMIN  08/09/2023    INFLUENZA VACCINE (1) 09/01/2023    A1C  10/20/2023    EYE EXAM  03/20/2024    BMP  07/20/2024    LIPID  07/20/2024    ANNUAL REVIEW OF HM ORDERS  07/20/2024    FALL RISK ASSESSMENT  08/02/2024    ADVANCE CARE PLANNING  04/26/2027    HEPATITIS C SCREENING  Completed    PHQ-2 (once per calendar year)  Completed    IPV IMMUNIZATION  Aged Out    MENINGITIS IMMUNIZATION  Aged Out    COLORECTAL CANCER SCREENING  Discontinued     Labs reviewed in EPIC        Pertinent mammograms are reviewed under the imaging tab.    Review of Systems   Eyes:  Positive for visual disturbance.   Cardiovascular:  Positive for peripheral edema.   Gastrointestinal:  Positive for abdominal pain and heartburn.   Neurological:  Positive for weakness and headaches.         Patient has been advised of split billing requirements and indicates understanding: Yes At the check in, at the        COUNSELING:  Reviewed preventive health counseling, as reflected in patient instructions       Regular exercise       Healthy diet/nutrition      BMI:   Estimated body mass index is 40.28 kg/m  as calculated from the following:    Height as of 7/20/23: 1.6 m (5' 3\").    Weight as of 7/20/23: 103.1 kg (227 lb 6.4 oz).   Weight management plan: Discussed healthy diet and exercise guidelines      She reports that she has never smoked. She has never been exposed to tobacco smoke. She has never used smokeless tobacco.      Appropriate preventive services were discussed with this patient, including applicable screening as appropriate for cardiovascular disease, diabetes, osteopenia/osteoporosis, and glaucoma.  As appropriate for age/gender, discussed screening for colorectal cancer, prostate cancer, breast cancer, and cervical cancer. Checklist reviewing preventive services available has been given to the patient.    Reviewed patients plan of care and provided an AVS. The Basic Care Plan (routine screening as documented in Health " Maintenance) for Nereyda meets the Care Plan requirement. This Care Plan has been established and reviewed with the Patient and daughter.          Nelli Conner MD  [facility]    Identified Health Risks:  I have reviewed Opioid Use Disorder and Substance Use Disorder risk factors and made any needed referrals.

## 2023-08-31 ENCOUNTER — APPOINTMENT (OUTPATIENT)
Dept: INTERPRETER SERVICES | Facility: CLINIC | Age: 73
End: 2023-08-31
Payer: MEDICARE

## 2023-09-01 NOTE — RESULT ENCOUNTER NOTE
Letter and lab results mailed to patient.      Mariangel Escobedo MA     This is a surgical and/or non-medical patient.

## 2023-09-12 ENCOUNTER — PATIENT OUTREACH (OUTPATIENT)
Dept: GERIATRIC MEDICINE | Facility: CLINIC | Age: 73
End: 2023-09-12
Payer: MEDICARE

## 2023-09-12 NOTE — PROGRESS NOTES
[facility] Care Coordination Contact    Received a request to submit a DTR for the denied of Adult Day Care. Documentation completed and faxed to the health plan. Care Coordinator aware.    Jumana Cooper RN  Utilization   [facility]  [phone]

## 2023-09-18 ENCOUNTER — HOSPITAL ENCOUNTER (OUTPATIENT)
Dept: CARDIOLOGY | Facility: CLINIC | Age: 73
Discharge: HOME | End: 2023-09-18
Attending: INTERNAL MEDICINE | Admitting: INTERNAL MEDICINE
Payer: MEDICARE

## 2023-09-18 DIAGNOSIS — R60.0 BILATERAL LEG EDEMA: ICD-10-CM

## 2023-09-18 LAB — LVEF ECHO: NORMAL

## 2023-09-18 PROCEDURE — 93306 TTE W/DOPPLER COMPLETE: CPT | Mod: 26 | Performed by: INTERNAL MEDICINE

## 2023-09-18 PROCEDURE — 93306 TTE W/DOPPLER COMPLETE: CPT

## 2023-09-25 ENCOUNTER — HOSPITAL ENCOUNTER (OUTPATIENT)
Dept: ULTRASOUND IMAGING | Facility: CLINIC | Age: 73
Discharge: HOME | End: 2023-09-25
Attending: INTERNAL MEDICINE
Payer: MEDICARE

## 2023-09-25 ENCOUNTER — HOSPITAL ENCOUNTER (OUTPATIENT)
Dept: CT IMAGING | Facility: CLINIC | Age: 73
Discharge: HOME | End: 2023-09-25
Attending: INTERNAL MEDICINE
Payer: MEDICARE

## 2023-09-25 DIAGNOSIS — R10.31 RLQ ABDOMINAL PAIN: ICD-10-CM

## 2023-09-25 LAB
CREAT BLD-MCNC: 0.8 MG/DL (ref 0.5–1)
EGFRCR SERPLBLD CKD-EPI 2021: >60 ML/MIN/1.73M2

## 2023-09-25 PROCEDURE — 250N000011 HC RX IP 250 OP 636: Performed by: INTERNAL MEDICINE

## 2023-09-25 PROCEDURE — 82565 ASSAY OF CREATININE: CPT

## 2023-09-25 PROCEDURE — 250N000009 HC RX 250: Performed by: INTERNAL MEDICINE

## 2023-09-25 PROCEDURE — 76856 US EXAM PELVIC COMPLETE: CPT

## 2023-09-25 PROCEDURE — 74177 CT ABD & PELVIS W/CONTRAST: CPT | Mod: MG

## 2023-09-25 RX ORDER — IOPAMIDOL 755 MG/ML
500 INJECTION, SOLUTION INTRAVASCULAR ONCE
Status: COMPLETED | OUTPATIENT
Start: 2023-09-25 | End: 2023-09-25

## 2023-09-25 RX ADMIN — SODIUM CHLORIDE 25 ML: 9 INJECTION, SOLUTION INTRAVENOUS at 15:09

## 2023-09-25 RX ADMIN — IOPAMIDOL 100 ML: 755 INJECTION, SOLUTION INTRAVENOUS at 15:09

## 2023-09-26 NOTE — PROGRESS NOTES
2nd Attempt: Signed Letter not received from [facility], resent per process.    Joanie Arellano  Care Management Specialist  [facility]  [phone]

## 2023-10-02 ENCOUNTER — HOSPITAL ENCOUNTER (EMERGENCY)
Facility: CLINIC | Age: 73
Discharge: HOME | End: 2023-10-03
Attending: EMERGENCY MEDICINE | Admitting: EMERGENCY MEDICINE
Payer: MEDICARE

## 2023-10-02 ENCOUNTER — APPOINTMENT (OUTPATIENT)
Dept: GENERAL RADIOLOGY | Facility: CLINIC | Age: 73
End: 2023-10-02
Attending: EMERGENCY MEDICINE
Payer: MEDICARE

## 2023-10-02 DIAGNOSIS — L29.9 ITCHING: ICD-10-CM

## 2023-10-02 DIAGNOSIS — H10.13 ALLERGIC CONJUNCTIVITIS, BILATERAL: ICD-10-CM

## 2023-10-02 LAB
ANION GAP SERPL CALCULATED.3IONS-SCNC: 8 MMOL/L (ref 7–15)
BASO+EOS+MONOS # BLD AUTO: ABNORMAL 10*3/UL
BASO+EOS+MONOS NFR BLD AUTO: ABNORMAL %
BASOPHILS # BLD AUTO: 0 10E3/UL (ref 0–0.2)
BASOPHILS NFR BLD AUTO: 1 %
BUN SERPL-MCNC: 14.6 MG/DL (ref 8–23)
CALCIUM SERPL-MCNC: 9.2 MG/DL (ref 8.8–10.2)
CHLORIDE SERPL-SCNC: 102 MMOL/L (ref 98–107)
CREAT SERPL-MCNC: 0.67 MG/DL (ref 0.51–0.95)
DEPRECATED HCO3 PLAS-SCNC: 28 MMOL/L (ref 22–29)
EGFRCR SERPLBLD CKD-EPI 2021: >90 ML/MIN/1.73M2
EOSINOPHIL # BLD AUTO: 0.2 10E3/UL (ref 0–0.7)
EOSINOPHIL NFR BLD AUTO: 5 %
ERYTHROCYTE [DISTWIDTH] IN BLOOD BY AUTOMATED COUNT: 12.8 % (ref 10–15)
FLUAV RNA SPEC QL NAA+PROBE: NEGATIVE
FLUBV RNA RESP QL NAA+PROBE: NEGATIVE
GLUCOSE SERPL-MCNC: 120 MG/DL (ref 70–99)
HCT VFR BLD AUTO: 39.5 % (ref 35–47)
HGB BLD-MCNC: 12.9 G/DL (ref 11.7–15.7)
IMM GRANULOCYTES # BLD: 0 10E3/UL
IMM GRANULOCYTES NFR BLD: 1 %
LYMPHOCYTES # BLD AUTO: 0.9 10E3/UL (ref 0.8–5.3)
LYMPHOCYTES NFR BLD AUTO: 27 %
MCH RBC QN AUTO: 29.5 PG (ref 26.5–33)
MCHC RBC AUTO-ENTMCNC: 32.7 G/DL (ref 31.5–36.5)
MCV RBC AUTO: 90 FL (ref 78–100)
MONOCYTES # BLD AUTO: 0.6 10E3/UL (ref 0–1.3)
MONOCYTES NFR BLD AUTO: 17 %
NEUTROPHILS # BLD AUTO: 1.7 10E3/UL (ref 1.6–8.3)
NEUTROPHILS NFR BLD AUTO: 49 %
NRBC # BLD AUTO: 0 10E3/UL
NRBC BLD AUTO-RTO: 0 /100
NT-PROBNP SERPL-MCNC: 64 PG/ML (ref 0–900)
PLATELET # BLD AUTO: 190 10E3/UL (ref 150–450)
POTASSIUM SERPL-SCNC: 3.6 MMOL/L (ref 3.4–5.3)
RBC # BLD AUTO: 4.37 10E6/UL (ref 3.8–5.2)
RSV RNA SPEC NAA+PROBE: NEGATIVE
SARS-COV-2 RNA RESP QL NAA+PROBE: NEGATIVE
SODIUM SERPL-SCNC: 138 MMOL/L (ref 135–145)
TROPONIN T SERPL HS-MCNC: <6 NG/L
WBC # BLD AUTO: 3.5 10E3/UL (ref 4–11)

## 2023-10-02 PROCEDURE — 71046 X-RAY EXAM CHEST 2 VIEWS: CPT

## 2023-10-02 PROCEDURE — 99285 EMERGENCY DEPT VISIT HI MDM: CPT | Mod: 25

## 2023-10-02 PROCEDURE — 36415 COLL VENOUS BLD VENIPUNCTURE: CPT | Performed by: EMERGENCY MEDICINE

## 2023-10-02 PROCEDURE — 84484 ASSAY OF TROPONIN QUANT: CPT | Performed by: EMERGENCY MEDICINE

## 2023-10-02 PROCEDURE — 83880 ASSAY OF NATRIURETIC PEPTIDE: CPT | Performed by: EMERGENCY MEDICINE

## 2023-10-02 PROCEDURE — 250N000013 HC RX MED GY IP 250 OP 250 PS 637: Performed by: EMERGENCY MEDICINE

## 2023-10-02 PROCEDURE — 87637 SARSCOV2&INF A&B&RSV AMP PRB: CPT | Performed by: EMERGENCY MEDICINE

## 2023-10-02 PROCEDURE — 93005 ELECTROCARDIOGRAM TRACING: CPT

## 2023-10-02 PROCEDURE — 80048 BASIC METABOLIC PNL TOTAL CA: CPT | Performed by: EMERGENCY MEDICINE

## 2023-10-02 PROCEDURE — 85025 COMPLETE CBC W/AUTO DIFF WBC: CPT | Performed by: EMERGENCY MEDICINE

## 2023-10-02 RX ORDER — ACETAMINOPHEN 500 MG
500 TABLET ORAL ONCE
Status: COMPLETED | OUTPATIENT
Start: 2023-10-02 | End: 2023-10-02

## 2023-10-02 RX ADMIN — ACETAMINOPHEN 500 MG: 500 TABLET, FILM COATED ORAL at 18:11

## 2023-10-02 ASSESSMENT — ACTIVITIES OF DAILY LIVING (ADL)
ADLS_ACUITY_SCORE: 35

## 2023-10-02 NOTE — ED TRIAGE NOTES
Pt arrives for two days of body aches, eye redness/drainage, headaches and generalized itching. Denies fevers. No meds PTA. ABCs intact.     /81   Pulse 83   Temp 97.2  F (36.2  C) (Oral)   Resp 20   LMP  (LMP Unknown)   SpO2 100%        Triage Assessment       Row Name 10/02/23 4226       Triage Assessment (Adult)    Airway WDL WDL       Respiratory WDL    Respiratory WDL WDL       Cardiac WDL    Cardiac WDL WDL       Cognitive/Neuro/Behavioral WDL    Cognitive/Neuro/Behavioral WDL WDL

## 2023-10-02 NOTE — ED NOTES
PIT/Triage Evaluation    Patient presented with daughter for evaluation of multiple symptoms.  Daughter reports symptoms including headache, bilateral conjunctival injection, diffuse body aches, itchiness, shortness of breath, and lower extremity edema.  The symptoms have worsened over the past 2 days.  Daughter questioned if this could be reaction to contrast dye as she was evaluated by PCP last week for abdominal pain.  Family notes symptoms began 2 days after receiving contrast.  Family is unaware the patient has received contrast before.    Exam is notable for:  Awake, alert, resting comfortably on gurney  Lungs clear to auscultation bilaterally  Regular rate and rhythm  S1 and S2 present  Abdomen soft, nontender, nondistended  Mild conjunctival injection      Appropriate interventions for symptom management were initiated if applicable.  Appropriate diagnostic tests were initiated if indicated.      I briefly evaluated the patient and developed an initial plan of care. I discussed this plan and explained that this brief interaction does not constitute a full evaluation. Patient/family understands that they should wait to be fully evaluated and discuss any test results with another clinician prior to leaving the hospital.       John Miranda MD  10/02/23 8628

## 2023-10-02 NOTE — LETTER
October 3, 2023      To Whom It May Concern:      Nereyda Rosa was seen in our Emergency Department today, 10/03/23.  I expect her condition to improve over the next 1-3 days.  She may return to work/school when improved.    Sincerely,        Shanika Ha RN

## 2023-10-03 ENCOUNTER — PATIENT OUTREACH (OUTPATIENT)
Dept: GERIATRIC MEDICINE | Facility: CLINIC | Age: 73
End: 2023-10-03
Payer: MEDICARE

## 2023-10-03 VITALS
DIASTOLIC BLOOD PRESSURE: 95 MMHG | HEART RATE: 66 BPM | OXYGEN SATURATION: 98 % | TEMPERATURE: 97.2 F | RESPIRATION RATE: 20 BRPM | SYSTOLIC BLOOD PRESSURE: 155 MMHG

## 2023-10-03 LAB
ATRIAL RATE - MUSE: 70 BPM
DIASTOLIC BLOOD PRESSURE - MUSE: NORMAL MMHG
INTERPRETATION ECG - MUSE: NORMAL
P AXIS - MUSE: 43 DEGREES
PR INTERVAL - MUSE: 162 MS
QRS DURATION - MUSE: 78 MS
QT - MUSE: 394 MS
QTC - MUSE: 425 MS
R AXIS - MUSE: 23 DEGREES
SYSTOLIC BLOOD PRESSURE - MUSE: NORMAL MMHG
T AXIS - MUSE: 36 DEGREES
VENTRICULAR RATE- MUSE: 70 BPM

## 2023-10-03 PROCEDURE — 250N000013 HC RX MED GY IP 250 OP 250 PS 637: Performed by: EMERGENCY MEDICINE

## 2023-10-03 RX ORDER — CETIRIZINE HYDROCHLORIDE 10 MG/1
10 TABLET ORAL ONCE
Status: COMPLETED | OUTPATIENT
Start: 2023-10-03 | End: 2023-10-03

## 2023-10-03 RX ORDER — CETIRIZINE HYDROCHLORIDE 10 MG/1
10 TABLET ORAL DAILY
Qty: 30 TABLET | Refills: 0 | Status: SHIPPED | OUTPATIENT
Start: 2023-10-03 | End: 2024-02-12

## 2023-10-03 RX ADMIN — CETIRIZINE HYDROCHLORIDE 10 MG: 10 TABLET, FILM COATED ORAL at 01:09

## 2023-10-03 ASSESSMENT — ACTIVITIES OF DAILY LIVING (ADL): ADLS_ACUITY_SCORE: 35

## 2023-10-03 NOTE — ED PROVIDER NOTES
History     Chief Complaint:  Pruritis and Generalized Body Aches     The history is provided by the patient and a relative. A  was used.      Nereyda Rosa is a 73 year old female who presents with pruritus and itchy, red eyes. She states that these symptoms started 2 days ago, and that the itchiness is centered in her eyes and nose. She adds that the light is irritable to her eyes but denies vision loss. Her daughters report that their mother uses eye makeup but has not used any new products, and that she does not wear contact lenses or glasses. Her daughter notes that her face appears swollen and they report noticing hives on her skin as well. Nereyda states that she sometimes has difficulty breathing when walking around and has leg swelling at baseline. Her daughters note a history of high blood pressure, diabetes, and allergies to chicken and salt. They add that their mother gets headaches at baseline. Nereyda denies the presence of a foreign body in her eye or taking medications prior to coming to the emergency department.     Independent Historian:   Daughter - They report as noted above.    Review of External Notes:   I reviewed the patient's echo from 09-      Medications:    Furosemide   Hydrochlorothiazide     Past Medical History:    Hypertension   Bilateral leg edema   Mental disorder, not otherwise specified  Chronic pain of both knees  Type 2 diabetes mellitus   PTSD  Encounter for screening for malignant neoplasm of colon  Mixed incontinence     Past Surgical History:    Colonoscopy      Physical Exam   Patient Vitals for the past 24 hrs:   BP Temp Temp src Pulse Resp SpO2   10/02/23 1620 139/81 97.2  F (36.2  C) Oral 83 20 100 %      Physical Exam  General: Resting on the bed.  Head: No obvious trauma to head.  Ears, Nose, Throat:  External ears normal.  Nose normal.  No pharyngeal erythema, swelling or exudate.  Midline uvula.    Eyes:  Conjunctivae injected.  Pupils  are equal, round, and reactive.  EOMI.  Negative nehemias sign, no abnormal uptake noted with fluoroscein.  Slit lamp exam without cell and flare.  Tonometry 16 L, 14 R.    Neck: Normal range of motion.  Neck supple.   CV: Regular rate and rhythm.  No murmurs.      Respiratory: Effort normal and breath sounds normal.  No wheezing or crackles.   Gastrointestinal: Soft.  No distension. There is no tenderness.  Skin: Skin is warm and dry.  No rash noted.     Emergency Department Course   ECG  ECG taken at 1840, ECG read at 0057  Sinus rhythm   Normal ECG   No prior ECG for comparison.   Rate 70 bpm. AZ interval 162 ms. QRS duration 78 ms. QT/QTc 394/425 ms. P-R-T axes 43 23 36.     Imaging:  XR Chest 2 Views   Final Result   IMPRESSION: Lungs are clear, aside from minimal atelectasis or scarring at the left lung base. No pleural effusions or pneumothorax. Nonenlarged cardiac silhouette.         Report per radiology    Laboratory:  Labs Ordered and Resulted from Time of ED Arrival to Time of ED Departure   BASIC METABOLIC PANEL - Abnormal       Result Value    Sodium 138      Potassium 3.6      Chloride 102      Carbon Dioxide (CO2) 28      Anion Gap 8      Urea Nitrogen 14.6      Creatinine 0.67      GFR Estimate >90      Calcium 9.2      Glucose 120 (*)    CBC WITH PLATELETS AND DIFFERENTIAL - Abnormal    WBC Count 3.5 (*)     RBC Count 4.37      Hemoglobin 12.9      Hematocrit 39.5      MCV 90      MCH 29.5      MCHC 32.7      RDW 12.8      Platelet Count 190      % Neutrophils 49      % Lymphocytes 27      % Monocytes 17      Mids % (Monos, Eos, Basos)        % Eosinophils 5      % Basophils 1      % Immature Granulocytes 1      NRBCs per 100 WBC 0      Absolute Neutrophils 1.7      Absolute Lymphocytes 0.9      Absolute Monocytes 0.6      Mids Abs (Monos, Eos, Basos)        Absolute Eosinophils 0.2      Absolute Basophils 0.0      Absolute Immature Granulocytes 0.0      Absolute NRBCs 0.0     INFLUENZA A/B, RSV, &  SARS-COV2 PCR - Normal    Influenza A PCR Negative      Influenza B PCR Negative      RSV PCR Negative      SARS CoV2 PCR Negative     TROPONIN T, HIGH SENSITIVITY - Normal    Troponin T, High Sensitivity <6     NT PROBNP INPATIENT - Normal    N terminal Pro BNP Inpatient 64        Emergency Department Course & Assessments:    Interventions:  Medications   cetirizine (zyrTEC) tablet 10 mg (has no administration in time range)   acetaminophen (TYLENOL) tablet 500 mg (500 mg Oral $Given 10/2/23 1811)      Assessments:  2358 I obtained the patient's history and examined as noted above.      Independent Interpretation (X-rays, CTs, rhythm strip):  None    Consultations/Discussion of Management or Tests:  None     Social Determinants of Health affecting care:   None    Disposition:  The patient was discharged to home.     Impression & Plan    CMS Diagnoses: None    Medical Decision Makin-year-old female presents to the ER with pruritus.  Vitals are reassuring.  Broad differential was pursued including not limited to allergic reaction, angioedema, anaphylaxis, conjunctivitis, herpes zoster, foreign body, open globe, endophthalmitis, electrolyte, metabolic, renal dysfunction, etc.  Overall patient's chief complaint appears to be itching of the face and eyes.  She denies any vision changes.  We are unable to check her vision as she does not read the English out of the bed.  But she reports that her vision is intact.  Tonometry is normal not suggestive of acute angle-closure glaucoma.  Fluorescein stain was negative, no vesicles, negative Martínez sign, no appreciable ulceration or abrasion.  Slit-lamp examination reveals no cell and flare.  Low suspicion for endophthalmitis on examination injected conjunctive a bilaterally.  This is consistent with allergic conjunctivitis based on her itching.  I have prescribed an antihistamine as well as antihistamine eyedrops.  Patient also has numerous chronic ailments for which  work-up was pursued by her primary doctor as well as the triage physician.  CBC shows no leukocytosis or anemia, chronic leukopenia..  BMP shows no acute electrolyte, metabolic or renal dysfunction.  EKG is reassuring, sinus rhythm.  No ischemic change.  Troponin undetectable.  BNP is normal.  I do not see evidence of CHF, ACS, arrhythmia, PE based on history or examination.  Chest x-ray is clear without evidence acute pneumonia, pneumothorax or effusion.  Patient had recent echo that showed normal EF.  Overall patient does not present for the symptoms or other itching.  I have prescribed antihistamines and antihistamine eyedrops.  I recommend follow-up with ophthalmology if not improving.  Return precautions were given and patient was discharged home      Diagnosis:    ICD-10-CM    1. Allergic conjunctivitis, bilateral  H10.13       2. Itching  L29.9            Discharge Medications:  New Prescriptions    CETIRIZINE (ZYRTEC) 10 MG TABLET    Take 1 tablet (10 mg) by mouth daily    KETOTIFEN FUMARATE 0.035%, KETOTIFEN 0.025%, (ZADITOR) 0.025 % OPHTHALMIC SOLUTION    Place 1 drop into both eyes 2 times daily      Scribe Disclosure:  Solange FIERRO, am serving as a scribe at 12:28 AM on 10/3/2023 to document services personally performed by Sejal Jeffrey MD based on my observations and the provider's statements to me.     10/2/2023   Sejal Jeffrey MD Bennett, Jennifer L, MD  10/03/23 7022

## 2023-10-03 NOTE — PROGRESS NOTES
[facility] Care Coordination Contact  CC received notification of Emergency Room visit.  ER visit occurred on 10/2/23 at [facility] with Dx of allergic conjunctivitis.    CC contacted adult daughter Cali  and reviewed discharge summary.  Member has a follow-up appointment with PCP: No: Offered Assistance with setting up a follow up appointment  Member has had a change in condition: No  New referrals placed: No  Home Visit Needed: No  Care plan reviewed and updated.  PCP notified of ED visit via EMR.    Yuly Dotson RN,  PHN  [facility] Care Coordinator  [phone]

## 2023-10-03 NOTE — DISCHARGE INSTRUCTIONS
Please with ophthalmology if not improving in the next several days.  You may use the allergy drops as well as allergy oral medication to help with itching.  Return to the ER if having worsening vision, discharge, difficulty breathing, lip swelling, etc.    Discharge Instructions  Hives or Urticaria    Hives are a rash with raised, swollen, red, itchy spots on the skin. They may look like mosquito bites. Hives change in size, shape and location over time.  Hives can be caused by an infection (usually a virus) or an allergic reaction (to medicine, food, insect stings, or many other things). They can also come because of your own body s reaction to things like body temperature changes or pressure on the skin. Sometimes hives are caused by an inherited problem. Hives are not contagious.    Generally, every Emergency Department visit should have a follow-up clinic visit with either a primary or a specialty clinic/provider. Please follow-up as instructed by your emergency provider today.    Return to the Emergency Department if:  You have trouble breathing.  Your lips or tongue swell up, or you have swelling or tightness in the throat.  You have stomach pain or vomiting (throwing up).  You pass out.    What can I do to help myself?  Fill any prescriptions the provider gave you and take them right away.  Antihistamines (H1 blockers) are the primary treatment for hives. You may be given a prescription for an antihistamine, or your provider may tell you to use one available without a prescription, such as Benadryl  (diphenhydramine). These medicines relieve the itching and can help make the hives go away.  You may be given a prescription for a steroid. Used long-term, these can have side effects, but are in the short-term. You may notice restlessness or increased appetite. Be sure to take all of the medicine as prescribed.   Sometimes your provider will recommend an H2 antihistamine, such as Zantac  (ranitidine) or Pepcid   "(famotidine). These are usually used for stomach problems, but also can help with hives.   Avoid scratching! This makes the hives get worse. You may want to put socks on your hands (or on your child s hands) when sleeping.  Avoid tight clothes, or clothes that rub on your skin.  If the itching is too bad, try cool compresses or cool baths. Avoid hot baths and showers, because they can make hives worse.  If your hives were felt to be related to a serious allergic reaction, you may be given an epinephrine auto-injector (such as EpiPen ) to use at home. Use this if you have a serious allergic reaction and call an ambulance right away. This medicine is used to buy time to get to a hospital, but not to replace hospital care.   If you were given a prescription for medicine here today, be sure to read all of the information (including the package insert) that comes with your prescription.  This will include important information about the medicine, its side effects, and any warnings that you need to know about.  The pharmacist who fills the prescription can provide more information and answer questions you may have about the medicine.  If you have questions or concerns that the pharmacist cannot address, please call or return to the Emergency Department.   Remember that you can always come back to the Emergency Department if you are not able to see your regular provider in the amount of time listed above, if you get any new symptoms, or if there is anything that worries you.      Discharge Instructions  Conjunctivitis  Conjunctivitis, or \"pinkeye\", is inflammation of the conjunctiva, which is the thin membrane that lines the inner surface of the eyelids and the whites of the eyes.   There are four main types of conjunctivitis: viral, bacterial, allergic, and non-specific. Both bacterial and viral conjunctivitis spread easily from one person to another by contact with the eye or another person s hands, by an object the " infected person has touched (such as a door handle), or by sharing an object that has touched their eye (such as a towel or pillowcase). Because of this, children with bacterial conjunctivitis cannot go back to school or  until they have been on antibiotics for 24 hours.  Generally, every Emergency Department visit should have a follow-up clinic visit with either a primary or a specialty clinic/provider. Please follow-up as instructed by your emergency provider today.  VIRAL CONJUNCTIVITIS: The virus that causes the common cold and is often seen as part of a general cold typically causes this type of conjunctivitis.  This type of conjunctivitis is not treated with antibiotics, and usually lasts 3 - 5 days.  An over-the-counter antihistamine/decongestant eye drop may help to relieve the itching and irritation of viral conjunctivitis.  BACTERIAL CONJUNCTIVITIS:  This is treated with an antibiotic ointment or eye drop.  In both bacterial and viral conjunctivitis, do not wear contact lenses until your eye is no longer red.   Your contact case should be thrown away and the contacts disinfected overnight, or replaced if disposable.  NON-SPECIFIC CONJUNCTIVITIS: Sometimes a red eye is caused by other things such as dry eye, chemical exposure, or foreign body in the eye such as dust or eyelash.   All of these problems generally improve on their own within 24 hours.  ALLERGIC CONJUNCTIVITIS: These are eye symptoms caused by allergies. This type of conjunctivitis will be treated with allergy medications.    Return to the Emergency Department if:  If you have blurry vision.  If you have increasing eye pain or drainage.  If you have new redness or swelling in the skin around the eye.  If you were given a prescription for medicine here today, be sure to read all of the information (including the package insert) that comes with your prescription.  This will include important information about the medicine, its side  effects, and any warnings that you need to know about.  The pharmacist who fills the prescription can provide more information and answer questions you may have about the medicine.  If you have questions or concerns that the pharmacist cannot address, please call or return to the Emergency Department.   Remember that you can always come back to the Emergency Department if you are not able to see your regular provider in the amount of time listed above, if you get any new symptoms, or if there is anything that worries you.

## 2023-10-10 ENCOUNTER — TELEPHONE (OUTPATIENT)
Dept: INTERNAL MEDICINE | Facility: CLINIC | Age: 73
End: 2023-10-10
Payer: MEDICARE

## 2023-10-10 NOTE — TELEPHONE ENCOUNTER
Fax received from [facility] - Medical Report for review and signature.  Put in Dr. Gordillo's in basket.

## 2023-10-23 ENCOUNTER — PATIENT OUTREACH (OUTPATIENT)
Dept: GERIATRIC MEDICINE | Facility: CLINIC | Age: 73
End: 2023-10-23
Payer: MEDICARE

## 2023-10-23 NOTE — PROGRESS NOTES
[facility] Care Coordination Contact    [facility] agency called that there was a gap in services 8/1/23. Previous waiver span was 10/1/22 to 9/30/23. Auth from the next waiver span 9/1/23 to 8/30/24 is in the file.   Tried to call back with the above information. Unable to leave a message.     Yuly Dotson RN,  PHN  [facility] Care Coordinator  [phone]

## 2023-10-31 NOTE — PROGRESS NOTES
No Letter Received: 60 day tracking of letter complete, no letter received from [facility]. Tracking discontinued.    Bessie Hawk  Case Management Specialist   [facility]  [phone]     [Negative] : Heme/Lymph [de-identified] : dermatofibroma [de-identified] : Schwannoma

## 2023-12-04 ENCOUNTER — PATIENT OUTREACH (OUTPATIENT)
Dept: GERIATRIC MEDICINE | Facility: CLINIC | Age: 73
End: 2023-12-04
Payer: MEDICARE

## 2023-12-04 NOTE — PROGRESS NOTES
[facility] Care Coordination Contact    Received a notice from [facility] the 12 month renewal paperwork was not received. Called the member and an  explained the situation.Email saved to member's file. Member reports she lost the key to her mailbox so all her mail is at the post office. She had someone with her that said they would help her complete the paperwork.     Yuly Dotson RN,  PHN  [facility] Care Coordinator  [phone]

## 2023-12-14 ENCOUNTER — TELEPHONE (OUTPATIENT)
Dept: INTERNAL MEDICINE | Facility: CLINIC | Age: 73
End: 2023-12-14
Payer: MEDICARE

## 2023-12-15 ENCOUNTER — MEDICAL CORRESPONDENCE (OUTPATIENT)
Dept: HEALTH INFORMATION MANAGEMENT | Facility: CLINIC | Age: 73
End: 2023-12-15

## 2023-12-15 NOTE — TELEPHONE ENCOUNTER
We received this form again. I attempted to call [facility] to see if they really need this again. No answer.I will fax the form back with this question.

## 2024-01-22 ENCOUNTER — PATIENT OUTREACH (OUTPATIENT)
Dept: GERIATRIC MEDICINE | Facility: CLINIC | Age: 74
End: 2024-01-22
Payer: MEDICARE

## 2024-01-22 NOTE — PROGRESS NOTES
[facility] Care Coordination Contact      [facility] Six-Month Telephone Assessment    6 month telephone assessment completed on 1/22/24 with Francisco Javier Schulz  and member.    ER visits: Yes -  [facility]  Hospitalizations: No  TCU stays: No  Significant health status changes: No changes  Falls/Injuries: Yes: 1 fall with no injury   ADL/IADL changes: No  Changes in services: No    Called [facility] and unable to leave a message.  Mailbox is full.  Calling to check on housing stabilization.   Asked about the MA renewal and if it was completed. Nereyda believes someone at the adult day care assisted her.      Caregiver Assessment follow up:  No changes    Goals: See POC in chart for goal progress documentation.      Will see member in 6 months for an annual health risk assessment.   Encouraged member to call CC with any questions or concerns in the meantime.     Yuly Dotson RN,  PHN  [facility] Care Coordinator  [phone]

## 2024-02-12 ENCOUNTER — OFFICE VISIT (OUTPATIENT)
Dept: INTERNAL MEDICINE | Facility: CLINIC | Age: 74
End: 2024-02-12
Payer: MEDICARE

## 2024-02-12 VITALS
DIASTOLIC BLOOD PRESSURE: 81 MMHG | OXYGEN SATURATION: 99 % | BODY MASS INDEX: 39.34 KG/M2 | TEMPERATURE: 98.2 F | RESPIRATION RATE: 20 BRPM | HEART RATE: 74 BPM | HEIGHT: 63 IN | WEIGHT: 222 LBS | SYSTOLIC BLOOD PRESSURE: 131 MMHG

## 2024-02-12 DIAGNOSIS — N93.9 VAGINAL BLEEDING: ICD-10-CM

## 2024-02-12 DIAGNOSIS — E11.9 TYPE 2 DIABETES MELLITUS WITHOUT COMPLICATION, WITHOUT LONG-TERM CURRENT USE OF INSULIN (H): ICD-10-CM

## 2024-02-12 DIAGNOSIS — L50.9 URTICARIA: Primary | ICD-10-CM

## 2024-02-12 DIAGNOSIS — M25.562 CHRONIC PAIN OF BOTH KNEES: ICD-10-CM

## 2024-02-12 DIAGNOSIS — E66.01 MORBID OBESITY (H): ICD-10-CM

## 2024-02-12 DIAGNOSIS — M25.561 CHRONIC PAIN OF BOTH KNEES: ICD-10-CM

## 2024-02-12 DIAGNOSIS — R60.0 BILATERAL LEG EDEMA: ICD-10-CM

## 2024-02-12 DIAGNOSIS — G89.29 CHRONIC PAIN OF BOTH KNEES: ICD-10-CM

## 2024-02-12 DIAGNOSIS — Z13.29 SCREENING FOR THYROID DISORDER: ICD-10-CM

## 2024-02-12 LAB
ALBUMIN SERPL BCG-MCNC: 4.1 G/DL (ref 3.5–5.2)
ALP SERPL-CCNC: 87 U/L (ref 40–150)
ALT SERPL W P-5'-P-CCNC: 13 U/L (ref 0–50)
ANION GAP SERPL CALCULATED.3IONS-SCNC: 10 MMOL/L (ref 7–15)
AST SERPL W P-5'-P-CCNC: 26 U/L (ref 0–45)
BILIRUB SERPL-MCNC: 0.3 MG/DL
BUN SERPL-MCNC: 22.4 MG/DL (ref 8–23)
CALCIUM SERPL-MCNC: 9.5 MG/DL (ref 8.8–10.2)
CHLORIDE SERPL-SCNC: 105 MMOL/L (ref 98–107)
CREAT SERPL-MCNC: 0.87 MG/DL (ref 0.51–0.95)
CREAT UR-MCNC: 201 MG/DL
DEPRECATED HCO3 PLAS-SCNC: 27 MMOL/L (ref 22–29)
EGFRCR SERPLBLD CKD-EPI 2021: 69 ML/MIN/1.73M2
GLUCOSE SERPL-MCNC: 96 MG/DL (ref 70–99)
HBA1C MFR BLD: 6 % (ref 0–5.6)
MICROALBUMIN UR-MCNC: <12 MG/L
MICROALBUMIN/CREAT UR: NORMAL MG/G{CREAT}
POTASSIUM SERPL-SCNC: 4.2 MMOL/L (ref 3.4–5.3)
PROT SERPL-MCNC: 7.2 G/DL (ref 6.4–8.3)
SODIUM SERPL-SCNC: 142 MMOL/L (ref 135–145)
TSH SERPL DL<=0.005 MIU/L-ACNC: 0.96 UIU/ML (ref 0.3–4.2)

## 2024-02-12 PROCEDURE — 99214 OFFICE O/P EST MOD 30 MIN: CPT | Performed by: INTERNAL MEDICINE

## 2024-02-12 PROCEDURE — 36415 COLL VENOUS BLD VENIPUNCTURE: CPT | Performed by: INTERNAL MEDICINE

## 2024-02-12 PROCEDURE — 82570 ASSAY OF URINE CREATININE: CPT | Performed by: INTERNAL MEDICINE

## 2024-02-12 PROCEDURE — 99207 PR FOOT EXAM NO CHARGE: CPT | Performed by: INTERNAL MEDICINE

## 2024-02-12 PROCEDURE — 80053 COMPREHEN METABOLIC PANEL: CPT | Performed by: INTERNAL MEDICINE

## 2024-02-12 PROCEDURE — 83036 HEMOGLOBIN GLYCOSYLATED A1C: CPT | Performed by: INTERNAL MEDICINE

## 2024-02-12 PROCEDURE — 84443 ASSAY THYROID STIM HORMONE: CPT | Performed by: INTERNAL MEDICINE

## 2024-02-12 PROCEDURE — 82043 UR ALBUMIN QUANTITATIVE: CPT | Performed by: INTERNAL MEDICINE

## 2024-02-12 RX ORDER — FAMOTIDINE 40 MG/1
40 TABLET, FILM COATED ORAL DAILY
Qty: 90 TABLET | Refills: 0 | Status: SHIPPED | OUTPATIENT
Start: 2024-02-12 | End: 2024-05-13

## 2024-02-12 RX ORDER — NAPROXEN 500 MG/1
500 TABLET ORAL 2 TIMES DAILY WITH MEALS
Qty: 60 TABLET | Refills: 0 | Status: SHIPPED | OUTPATIENT
Start: 2024-02-12 | End: 2024-03-13

## 2024-02-12 RX ORDER — FUROSEMIDE 20 MG
20 TABLET ORAL DAILY
Qty: 90 TABLET | Refills: 0 | Status: SHIPPED | OUTPATIENT
Start: 2024-02-12 | End: 2024-05-13

## 2024-02-12 NOTE — PROGRESS NOTES
Assessment & Plan     Urticaria  At this time, patient is providing history suggestive of urticaria.  She has no active lesions noted on exam.  After much discussion, we did elect to try an alternative antihistamine to see if it would be more helpful for management of this condition.  A prescription for famotidine 40 mg daily has been submitted to her pharmacy.  Side effects of medication were reviewed.  Should her symptoms persist, then it may be worthwhile to consider having an allergist evaluate her symptoms further.  - famotidine (PEPCID) 40 MG tablet; Take 1 tablet (40 mg) by mouth daily    Type 2 diabetes mellitus without complication, without long-term current use of insulin (H)  Patient's blood sugars have been under good control as her last A1c was noted to be 5.9 in July 2023.  She has been managing her blood sugar through diet alone, but patient is concerned that her blood sugars may not be very well-controlled.  She was agreeable to have follow-up lab testing completed today.  Patient did submit a blood send today for a hemoglobin A1c, CMP, and a urine sample for urine microalbumin.  Results are currently pending.  She will be contacted once they are available for review.  Foot and eye examinations are up-to-date.  - Comprehensive metabolic panel (BMP + Alb, Alk Phos, ALT, AST, Total. Bili, TP); Future  - Albumin Random Urine Quantitative with Creat Ratio; Future  - Hemoglobin A1c    Morbid obesity (H)  BMI is 39.3 with a history of diabetes.  Weight loss encouraged.    Bilateral leg edema  Patient has had longstanding issues with bilateral lower extremity edema.  This issue has been well-controlled with the use of furosemide 20 mg daily, but unfortunate she did run out of her medications a few weeks ago.  She did have metabolic panel collected in fall 2023 that showed no concerning findings.  We did elect to continue her furosemide at 20 mg daily.  Side effects of medication were reviewed.  We did  discuss the need to be cautious about salt and fluid intake.  - furosemide (LASIX) 20 MG tablet; Take 1 tablet (20 mg) by mouth daily    Chronic pain of both knees  A PT referral has been placed for further evaluation and treatment of her chronic bilateral knee pain.  Will also provide her with a prescription for naproxen 5 mg that can be used by mouth twice per day for any pain from her knees.  Side effects of medication were reviewed.  - Physical Therapy Referral; Future  - naproxen (NAPROSYN) 500 MG tablet; Take 1 tablet (500 mg) by mouth 2 times daily (with meals)    Screening for thyroid disorder  - TSH with free T4 reflex    Vaginal bleeding  Patient request, and a referral to the gynecology department for further evaluation of her vaginal bleeding has been placed.  - Ob/Gyn  Referral; Future    Ordering of each unique test  Prescription drug management  30 minutes spent by me on the date of the encounter doing chart review, history and exam, documentation and further activities per the note        See Patient Instructions    Salvador Dawn is a 74 year old, presenting for the following health issues:  Derm Problem      2/12/2024     2:27 PM   Additional Questions   Roomed by MILES Young LPN   Accompanied by daughter     Patient is a 74-year-old female who presents to the clinic with multiple concerns.  She has had issues with persistent episodes of intermittent random rashes followed by itching.  Patient reports that these episodes have occurred since she relocated to [place] approximately 2 years ago.  She had previously been tried on Zyrtec 10 mg daily for her symptoms, but she did not find it to be very helpful.  Patient has not been unable to identify what triggers her rashes.  And itching.  She also has a history of diabetes, but she is not currently on any diabetic medication.  Her last hemoglobin A1c was noted to be 5.9 in July 2023.  Patient has also been dealing with persistent  "bilateral, lower extremity edema.  She has been taking furosemide 20 mg daily for management of this issue.  She has run out of this medication, and the swelling in her lower extremities has been more pronounced.  Patient also reports longstanding issues with chronic, bilateral knee pain.  She is requesting a referral to physical therapy.  Patient reports that she has tried this treatment modality in the past, and she did find it to be helpful.  Patient goes on to report that she is also had concerns about intermittent episodes of vaginal bleeding that tends to occur when she is exerting herself.  She is interested in meeting with a gynecologist for further evaluation of this issue.    History of Present Illness       Back Pain:  She presents for follow up of back pain. Patient's back pain is a recurring problem.  Location of back pain:  Right lower back, right upper back, right shoulder, left shoulder, right hip and left hip  Description of back pain: burning, cramping and sharp  Back pain spreads: right buttocks, right thigh, right knee, left knee, right foot, left foot, right shoulder, left shoulder, right side of neck and left side of neck    Since patient first noticed back pain, pain is: unchanged  Does back pain interfere with her job:  Not applicable       Hypertension: She presents for follow up of hypertension.  She does not check blood pressure  regularly outside of the clinic. Outpatient blood pressures have not been over 140/90. She follows a low salt diet.     Headaches:   Since the patient's last clinic visit, headaches are: worsened  The patient is getting headaches:  Every other day  She is not able to do normal daily activities when she has a migraine.  The patient is taking the following rescue/relief medications:  Tylenol   Patient states \"I get only a small amount of relief\" from the rescue/relief medications.   The patient is taking the following medications to prevent migraines:  No " "medications to prevent migraines  In the past 4 weeks, the patient has gone to an Urgent Care or Emergency Room 0 times times due to headaches.    She eats 2-3 servings of fruits and vegetables daily.She consumes 1 sweetened beverage(s) daily.She exercises with enough effort to increase her heart rate 9 or less minutes per day.  She exercises with enough effort to increase her heart rate 3 or less days per week.   She is taking medications regularly.         Review of Systems  CONSTITUTIONAL: NEGATIVE for fever, chills, change in weight  INTEGUMENTARY/SKIN: Positive for itching and intermittent red rash.  ENT/MOUTH: NEGATIVE for ear, mouth and throat problems  RESP: NEGATIVE for significant cough or SOB  CV: NEGATIVE for chest pain, palpitations or peripheral edema  GI: NEGATIVE for nausea, abdominal pain, heartburn, or change in bowel habits  : Positive for vaginal bleeding.  MUSCULOSKELETAL: Positive for bilateral knee pain.  NEURO: NEGATIVE for weakness, dizziness or paresthesias  ENDOCRINE: NEGATIVE for temperature intolerance, skin/hair changes      Objective    /81   Pulse 74   Temp 98.2  F (36.8  C) (Oral)   Resp 20   Ht 1.6 m (5' 3\")   Wt 100.7 kg (222 lb)   LMP  (LMP Unknown)   SpO2 99%   Breastfeeding No   BMI 39.33 kg/m    Body mass index is 39.33 kg/m .  Physical Exam  Vitals reviewed.   HENT:      Head: Normocephalic and atraumatic.      Mouth/Throat:      Mouth: Mucous membranes are moist.      Pharynx: Oropharynx is clear.   Eyes:      Extraocular Movements: Extraocular movements intact.      Conjunctiva/sclera: Conjunctivae normal.      Pupils: Pupils are equal, round, and reactive to light.   Cardiovascular:      Rate and Rhythm: Normal rate and regular rhythm.      Pulses: Normal pulses.           Dorsalis pedis pulses are 2+ on the right side and 2+ on the left side.        Posterior tibial pulses are 2+ on the right side and 2+ on the left side.      Heart sounds: Normal heart " sounds.   Pulmonary:      Effort: Pulmonary effort is normal.      Breath sounds: Normal breath sounds.   Musculoskeletal:      Right lower leg: Edema present.      Left lower leg: Edema present.   Feet:      Right foot:      Protective Sensation: 4 sites tested.  4 sites sensed.      Skin integrity: Skin integrity normal.      Left foot:      Protective Sensation: 4 sites tested.  4 sites sensed.      Skin integrity: Skin integrity normal.   Skin:     Capillary Refill: Capillary refill takes less than 2 seconds.   Neurological:      General: No focal deficit present.      Mental Status: She is alert.     Diagnostic testing: Hemoglobin A1c, CMP, TSH, and urine microalbumin are pending.        Signed Electronically by: Geronimo Cooley MD

## 2024-02-16 ENCOUNTER — THERAPY VISIT (OUTPATIENT)
Dept: PHYSICAL THERAPY | Facility: CLINIC | Age: 74
End: 2024-02-16
Attending: INTERNAL MEDICINE
Payer: MEDICARE

## 2024-02-16 DIAGNOSIS — M25.561 CHRONIC PAIN OF BOTH KNEES: ICD-10-CM

## 2024-02-16 DIAGNOSIS — M25.562 CHRONIC PAIN OF BOTH KNEES: ICD-10-CM

## 2024-02-16 DIAGNOSIS — G89.29 CHRONIC PAIN OF BOTH KNEES: ICD-10-CM

## 2024-02-16 PROCEDURE — 97161 PT EVAL LOW COMPLEX 20 MIN: CPT | Mod: GP | Performed by: PHYSICAL THERAPIST

## 2024-02-16 PROCEDURE — 97110 THERAPEUTIC EXERCISES: CPT | Mod: GP | Performed by: PHYSICAL THERAPIST

## 2024-02-16 ASSESSMENT — ACTIVITIES OF DAILY LIVING (ADL)
WEAKNESS: THE SYMPTOM AFFECTS MY ACTIVITY SLIGHTLY
GO DOWN STAIRS: ACTIVITY IS VERY DIFFICULT
KNEE_ACTIVITY_OF_DAILY_LIVING_SUM: 12
PAIN: THE SYMPTOM AFFECTS MY ACTIVITY SLIGHTLY
GIVING WAY, BUCKLING OR SHIFTING OF KNEE: THE SYMPTOM AFFECTS MY ACTIVITY SLIGHTLY
GO UP STAIRS: ACTIVITY IS VERY DIFFICULT
AS_A_RESULT_OF_YOUR_KNEE_INJURY,_HOW_WOULD_YOU_RATE_YOUR_CURRENT_LEVEL_OF_DAILY_ACTIVITY?: ABNORMAL
WALK: ACTIVITY IS VERY DIFFICULT
HOW_WOULD_YOU_RATE_THE_OVERALL_FUNCTION_OF_YOUR_KNEE_DURING_YOUR_USUAL_DAILY_ACTIVITIES?: ABNORMAL

## 2024-02-16 NOTE — PROGRESS NOTES
PHYSICAL THERAPY EVALUATION  Type of Visit: Evaluation  History of bilateral  knee pain the past   6 months secondary to climbing stairs at home. Pt referred for physical therapy on 24  See electronic medical record for Abuse and Falls Screening details.    Subjective       Presenting condition or subjective complaint:    Date of onset: 24    Relevant medical history:     Dates & types of surgery:      Prior diagnostic imaging/testing results:       Prior therapy history for the same diagnosis, illness or injury:        Prior Level of Function  Transfers:  independent with difficulty  Ambulation: Assistive equipment  ADL: Independent  IADL: Housekeeping    Living Environment  Social support:     Type of home:     Stairs to enter the home:         Ramp:     Stairs inside the home:         Help at home:    Equipment owned:       Employment:      Hobbies/Interests:      Patient goals for therapy:      Pain assessment: Pain present  Location: left knee /Ratin/10  Location: right knee /Ratin/10     Objective   KNEE:    Standing Posture: WFL          Gait: uses cane, slow and guarded     Functional:   - Squat/ SL Squat:   - Lateral Step Down:             AROM: (* indicates patient's pain)   PROM:(over pressure)   L  R L R   Hyperextension   0 0 0 0   Extension   10 0 0 0   Flexion   85 90 90 100     Strength:   L R   HIP     Flex 3/5 4-/5   Ext 4/5 4/5   ABd 3/5 3/5   KNEE     Flex 4/5 4/5   Ext 4-/5 4-/5     Hip PROM:     L R   Flexion     Extension     IR     ER     Janina's     Hamstring 90-90     Da         Special tests:   L R   Sweep Test     Anterior Drawer     Dial Test     Posterior Drawer     Lachman's     Valgus 0 degrees     Valgus 30 degrees     Varus 0 degrees     Varus 30 degrees     Moni's     Appley's     Lateral Compression     Patellar Compression     SLR     Slump         Palpation: tenderness to palpation medial/lateral joint lines, posterior capsule, superior and inferior  borders of the patella bilaterally     Patellar tracking:           Assessment & Plan   CLINICAL IMPRESSIONS  Medical Diagnosis:      Treatment Diagnosis:     Impression/Assessment: Patient is a 74 year old female with bilateral knee  complaints.  The following significant findings have been identified: Pain, Decreased ROM/flexibility, and Decreased strength. These impairments interfere with their ability to perform self care tasks, recreational activities, household chores, household mobility, and community mobility as compared to previous level of function.     Clinical Decision Making (Complexity):  Clinical Presentation: Stable/Uncomplicated  Clinical Presentation Rationale: based on medical and personal factors listed in PT evaluation  Clinical Decision Making (Complexity): Low complexity    PLAN OF CARE  Treatment Interventions:  Modalities: Cryotherapy  Interventions: Therapeutic Activity    Long Term Goals     PT Goal 1  Goal Identifier: stairs  Goal Description: pt able to ascend /descend 7 stairs pain level 2  Rationale: to maximize safety and independence with performance of ADLs and functional tasks;to maximize safety and independence within the home;to maximize safety and independence within the community;to maximize safety and independence with transportation;to maximize safety and independence with self cares  Date Met: 04/26/24      Frequency of Treatment: 1x/week  Duration of Treatment: 10 weeks    Recommended Referrals to Other Professionals:   Education Assessment:   Learner/Method: No Barriers to Learning    Risks and benefits of evaluation/treatment have been explained.   Patient/Family/caregiver agrees with Plan of Care.     Evaluation Time:     PT Eval, Low Complexity Minutes (93063): 20       Signing Clinician: Amilcar Ambrocio PT      [facility]                                                                                   OUTPATIENT PHYSICAL THERAPY      PLAN  OF TREATMENT FOR OUTPATIENT REHABILITATION   Patient's Last Name, First Name, DONNIERubiVADIMRubi RosaNereyda YOB: 1950   Provider's Name   [facility]   Medical Record No.  [id number]     Onset Date: 02/12/24  Start of Care Date: 02/16/24     Medical Diagnosis:         PT Treatment Diagnosis:    Plan of Treatment  Frequency/Duration: 1x/week/ 10 weeks    Certification date from 02/16/24 to 04/26/24         See note for plan of treatment details and functional goals     Amilcar Ambrocio, PT                         I CERTIFY THE NEED FOR THESE SERVICES FURNISHED UNDER        THIS PLAN OF TREATMENT AND WHILE UNDER MY CARE     (Physician attestation of this document indicates review and certification of the therapy plan).              Referring Provider:  Geronimo Cooley    Initial Assessment  See Epic Evaluation- Start of Care Date: 02/16/24

## 2024-02-23 ENCOUNTER — THERAPY VISIT (OUTPATIENT)
Dept: PHYSICAL THERAPY | Facility: CLINIC | Age: 74
End: 2024-02-23
Payer: MEDICARE

## 2024-02-23 DIAGNOSIS — G89.29 CHRONIC PAIN OF BOTH KNEES: Primary | ICD-10-CM

## 2024-02-23 DIAGNOSIS — M25.561 CHRONIC PAIN OF BOTH KNEES: Primary | ICD-10-CM

## 2024-02-23 DIAGNOSIS — M25.562 CHRONIC PAIN OF BOTH KNEES: Primary | ICD-10-CM

## 2024-02-23 PROCEDURE — 97116 GAIT TRAINING THERAPY: CPT | Mod: GP | Performed by: PHYSICAL THERAPIST

## 2024-02-23 PROCEDURE — 97110 THERAPEUTIC EXERCISES: CPT | Mod: GP | Performed by: PHYSICAL THERAPIST

## 2024-03-01 ENCOUNTER — THERAPY VISIT (OUTPATIENT)
Dept: PHYSICAL THERAPY | Facility: CLINIC | Age: 74
End: 2024-03-01
Payer: MEDICARE

## 2024-03-01 DIAGNOSIS — M25.561 CHRONIC PAIN OF BOTH KNEES: Primary | ICD-10-CM

## 2024-03-01 DIAGNOSIS — M25.562 CHRONIC PAIN OF BOTH KNEES: Primary | ICD-10-CM

## 2024-03-01 DIAGNOSIS — G89.29 CHRONIC PAIN OF BOTH KNEES: Primary | ICD-10-CM

## 2024-03-01 PROCEDURE — 97110 THERAPEUTIC EXERCISES: CPT | Mod: GP | Performed by: PHYSICAL THERAPIST

## 2024-03-01 PROCEDURE — 97140 MANUAL THERAPY 1/> REGIONS: CPT | Mod: GP | Performed by: PHYSICAL THERAPIST

## 2024-03-08 ENCOUNTER — THERAPY VISIT (OUTPATIENT)
Dept: PHYSICAL THERAPY | Facility: CLINIC | Age: 74
End: 2024-03-08
Payer: MEDICARE

## 2024-03-08 DIAGNOSIS — M25.561 CHRONIC PAIN OF BOTH KNEES: Primary | ICD-10-CM

## 2024-03-08 DIAGNOSIS — G89.29 CHRONIC PAIN OF BOTH KNEES: Primary | ICD-10-CM

## 2024-03-08 DIAGNOSIS — M25.562 CHRONIC PAIN OF BOTH KNEES: Primary | ICD-10-CM

## 2024-03-08 PROCEDURE — 97116 GAIT TRAINING THERAPY: CPT | Mod: GP | Performed by: PHYSICAL THERAPIST

## 2024-03-08 PROCEDURE — 97110 THERAPEUTIC EXERCISES: CPT | Mod: GP | Performed by: PHYSICAL THERAPIST

## 2024-03-12 DIAGNOSIS — M25.561 CHRONIC PAIN OF BOTH KNEES: ICD-10-CM

## 2024-03-12 DIAGNOSIS — G89.29 CHRONIC PAIN OF BOTH KNEES: ICD-10-CM

## 2024-03-12 DIAGNOSIS — M25.562 CHRONIC PAIN OF BOTH KNEES: ICD-10-CM

## 2024-03-13 RX ORDER — NAPROXEN 500 MG/1
500 TABLET ORAL 2 TIMES DAILY WITH MEALS
Qty: 60 TABLET | Refills: 0 | Status: SHIPPED | OUTPATIENT
Start: 2024-03-13

## 2024-04-17 ENCOUNTER — OFFICE VISIT (OUTPATIENT)
Dept: OBGYN | Facility: CLINIC | Age: 74
End: 2024-04-17
Attending: INTERNAL MEDICINE
Payer: MEDICARE

## 2024-04-17 VITALS
BODY MASS INDEX: 41.04 KG/M2 | SYSTOLIC BLOOD PRESSURE: 120 MMHG | WEIGHT: 231.6 LBS | DIASTOLIC BLOOD PRESSURE: 80 MMHG | HEIGHT: 63 IN

## 2024-04-17 DIAGNOSIS — N95.0 POSTMENOPAUSAL BLEEDING: Primary | ICD-10-CM

## 2024-04-17 DIAGNOSIS — N93.9 VAGINAL BLEEDING: ICD-10-CM

## 2024-04-17 PROCEDURE — 99213 OFFICE O/P EST LOW 20 MIN: CPT | Performed by: FAMILY MEDICINE

## 2024-04-17 NOTE — PROGRESS NOTES
SUBJECTIVE:  Nereyda Rosa is an 74 year old   woman who presents for   gynecology consult for post-menopausal bleeding, she went through menopause about 6 years ago and then   Had a fall, and started bleeding after the fall.  The last bleeding occurred about 2 months prior.  Now it is not occurring.   She was given some medications and then stopped bleeding.     She saw Dr. Callejas for this 2022 and had a pelvic ultrasound with thickened endometrium @ 12 mm noted   2022 and 3/2023.     No LMP recorded (lmp unknown). Patient is postmenopausal.       Past Medical History:   Diagnosis Date    Hypertension           Family History   Problem Relation Age of Onset    Glaucoma No family hx of     Macular Degeneration No family hx of        Past Surgical History:   Procedure Laterality Date    COLONOSCOPY N/A 2023    Procedure: COLONOSCOPY;  Surgeon: Fred Evans MD;  Location: [facility]       Current Outpatient Medications   Medication Sig Dispense Refill    famotidine (PEPCID) 40 MG tablet Take 1 tablet (40 mg) by mouth daily 90 tablet 0    furosemide (LASIX) 20 MG tablet Take 1 tablet (20 mg) by mouth daily 90 tablet 0    naproxen (NAPROSYN) 500 MG tablet TAKE 1 TABLET(500 MG) BY MOUTH TWICE DAILY WITH MEALS 60 tablet 0     No current facility-administered medications for this visit.     Allergies   Allergen Reactions    Chicken Allergy      Rash on shoulders    Food      Allergic to salt per pt       Social History     Tobacco Use    Smoking status: Never     Passive exposure: Never    Smokeless tobacco: Never   Substance Use Topics    Alcohol use: Never       Review Of Systems  Ears/Nose/Throat: negative  Respiratory: No shortness of breath, dyspnea on exertion, cough, or hemoptysis  Cardiovascular: negative  Gastrointestinal: negative  Genitourinary: See HPI   Constitutional, HEENT, cardiovascular, pulmonary, GI, , musculoskeletal, neuro, skin, endocrine and psych systems are negative, except as  "otherwise noted.    OBJECTIVE:  /80   Ht 1.6 m (5' 3\")   Wt 105.1 kg (231 lb 9.6 oz)   LMP  (LMP Unknown)   BMI 41.03 kg/m    General appearance: healthy, alert, and no distress  Skin: Skin color, texture, turgor normal. No rashes or lesions.  Ears: negative  Nose/Sinuses: Nares normal. Septum midline. Mucosa normal. No drainage or sinus tenderness.  Oropharynx: Lips, mucosa, and tongue normal. Teeth and gums normal.  Neck: Neck supple. No adenopathy. Thyroid symmetric, normal size,, Carotids without bruits.  Lungs: negative, Percussion normal. Good diaphragmatic excursion. Lungs clear  Heart: negative, PMI normal. No lifts, heaves, or thrills. RRR. No murmurs, clicks gallops or rub  Abdomen: Abdomen soft, non-tender. BS normal. No masses, organomegaly  Pelvic:  Pelvic examination with no pap/no Gonorrhea and Chlamydia   including  External genitalia normal   and vagina normal rugatted not atrophic  Examination of urethra  normal no masses, tenderness, scarring  bladder, no masses or tenderness  Cervix no lesions or discharge  Bimanual exam deferred      ASSESSMENT:  Nreeyda Rosa is an 74 year old   woman who presents for   gynecology consult for post-menopausal bleeding, she went through menopause about 6 years ago and then   Had a fall, and started bleeding after the fall.  The last bleeding occurred about 2 months prior.  Now it is not occurring.   She was given some medications and then stopped bleeding.     She saw Dr. Callejas for this 2022 and had a pelvic ultrasound with thickened endometrium @ 12 mm noted   2022 and 3/2023.     PLAN:  Dx:  1)  Postmenopausal bleeding with thickened endometrium:      Reviewed pelvic ultrasound and endometrial sampling, discussed endometrial biopsy either in the office or in the OR with hysteroscopy and dilation and curettage.  Explained that we should rule out endometrial cancer, hyperplasia, or polyp or mass.      She replied, \"its not a cancer, " "Allah, its just the fall that caused the period to start again.\"     I explained we should still biopsy the area to fine out why, and we have two ways of doing this, either through an office biopsy or hysteroscopy in the OR.        She started a medication 2 months ago and it stopped the bleeding.   She states \"God has healed me\" Reports pulsating type of pain when the \"period\" is occurring, but now with bleeding gone, doesn't have the pain.     She has some things to do at home and would like to return at another time for a biopsy either in the office or OR hysteroscopy.   She plans to return for office endometrial biopsy as able. When she is not busy.     Labs were reviewed in [facility]   Imaging was reviewed in Epic   Tests and documents were reviewed.   Discussion of management or test interpretation   Diagnosis or treatment significantly limited by social determinant   Non-english speaking: [place]  present via phone for entire visit.     24 minutes spent on the date of the encounter doing chart review, review of test results, interpretation of tests, patient visit, documentation, and discussion with family          Dr. Jovana Pearson, DO    Obstetrics and Gynecology  [facility]       "

## 2024-04-17 NOTE — PATIENT INSTRUCTIONS
Return for endometrial biopsy     Dr. Jovana Pearson, DO    Obstetrics and Gynecology  [facility] [place] [facility] [place]

## 2024-04-17 NOTE — NURSING NOTE
"Chief Complaint   Patient presents with    Abnormal Uterine Bleeding     Post menopausal bleeding--pt hasn't bled for 2 months       Initial /80   Ht 1.6 m (5' 3\")   Wt 105.1 kg (231 lb 9.6 oz)   LMP  (LMP Unknown)   BMI 41.03 kg/m   Estimated body mass index is 41.03 kg/m  as calculated from the following:    Height as of this encounter: 1.6 m (5' 3\").    Weight as of this encounter: 105.1 kg (231 lb 9.6 oz).  BP completed using cuff size: regular long    Questioned patient about current smoking habits.  Pt. has never smoked.          The following HM Due: NONE    "

## 2024-05-11 DIAGNOSIS — L50.9 URTICARIA: ICD-10-CM

## 2024-05-11 DIAGNOSIS — R60.0 BILATERAL LEG EDEMA: ICD-10-CM

## 2024-05-13 RX ORDER — FUROSEMIDE 20 MG
20 TABLET ORAL DAILY
Qty: 90 TABLET | Refills: 0 | Status: SHIPPED | OUTPATIENT
Start: 2024-05-13 | End: 2024-08-15

## 2024-05-13 RX ORDER — FAMOTIDINE 40 MG/1
40 TABLET, FILM COATED ORAL DAILY
Qty: 90 TABLET | Refills: 0 | Status: SHIPPED | OUTPATIENT
Start: 2024-05-13

## 2024-05-16 ENCOUNTER — OFFICE VISIT (OUTPATIENT)
Dept: OBGYN | Facility: CLINIC | Age: 74
End: 2024-05-16
Payer: MEDICARE

## 2024-05-16 VITALS
HEIGHT: 63 IN | BODY MASS INDEX: 39.95 KG/M2 | SYSTOLIC BLOOD PRESSURE: 118 MMHG | WEIGHT: 225.5 LBS | DIASTOLIC BLOOD PRESSURE: 80 MMHG

## 2024-05-16 DIAGNOSIS — N95.0 POSTMENOPAUSAL BLEEDING: Primary | ICD-10-CM

## 2024-05-16 PROCEDURE — 58100 BIOPSY OF UTERUS LINING: CPT | Performed by: FAMILY MEDICINE

## 2024-05-16 PROCEDURE — 88305 TISSUE EXAM BY PATHOLOGIST: CPT | Performed by: PATHOLOGY

## 2024-05-16 NOTE — NURSING NOTE
"Chief Complaint   Patient presents with    Minor Procedure     Endometrial biopsy       Initial /80   Ht 1.6 m (5' 3\")   Wt 102.3 kg (225 lb 8 oz)   LMP  (LMP Unknown)   BMI 39.95 kg/m   Estimated body mass index is 39.95 kg/m  as calculated from the following:    Height as of this encounter: 1.6 m (5' 3\").    Weight as of this encounter: 102.3 kg (225 lb 8 oz).  BP completed using cuff size: regular    Questioned patient about current smoking habits.  Pt. has never smoked.          The following HM Due: NONE  "

## 2024-05-16 NOTE — PATIENT INSTRUCTIONS
Return yearly and with any further bleeding   Dr. Jovana Pearson, DO    Obstetrics and Gynecology  [facility] - [place] and [place]

## 2024-05-16 NOTE — PROGRESS NOTES
S:  long history of post-menopausal bleeding with thickened endometrium 12 mm and then repeat us with 10 mm  Returns for biopsy today, and consent is signed. Procedure is explained.     Procedure:  Endometrial biopsy    Indication: Menometrorrhagia with age >35                   Endometrial cells on pap and age >40    Discussed risk of bleeding, infection, uterine perforation, cramping pain.  Pt agreed to proceed with procedure after all questions answered. INFORMED CONSENT OBTAINED   TIME OUT DONE PRIOR TO PROCEDURE:   STATING PROCEDURE NAME AND NEGATIVE URINE PREGNANCY TEST          Speculum placed and cervix visualized.  Cervix cleansed with betadine x 3.  Tenaculum placed on anterior lip of the cervix.  Endometrial biopsy pipelle passed through cervix and uterus sounded to 7 cm.  Biopsy specimen collected with two passes with return of moderate amount of pink tissue.  Specimen placed in a labeled container and set aside to be sent to pathology.  Tenaculum removed from the cervix and sites hemostatic.  No bleeding noted from cervical os.     Patient tolerated the procedure well.  There were no apparent complications and bleeding was minimal.    She is instructed to use no tampons and have no intercourse for the next 5 days.

## 2024-05-20 ENCOUNTER — PATIENT OUTREACH (OUTPATIENT)
Dept: GERIATRIC MEDICINE | Facility: CLINIC | Age: 74
End: 2024-05-20
Payer: MEDICARE

## 2024-05-20 ENCOUNTER — APPOINTMENT (OUTPATIENT)
Dept: INTERPRETER SERVICES | Facility: CLINIC | Age: 74
End: 2024-05-20
Payer: MEDICARE

## 2024-05-20 LAB
PATH REPORT.COMMENTS IMP SPEC: NORMAL
PATH REPORT.COMMENTS IMP SPEC: NORMAL
PATH REPORT.FINAL DX SPEC: NORMAL
PATH REPORT.GROSS SPEC: NORMAL
PATH REPORT.MICROSCOPIC SPEC OTHER STN: NORMAL
PATH REPORT.RELEVANT HX SPEC: NORMAL
PHOTO IMAGE: NORMAL

## 2024-05-20 NOTE — PROGRESS NOTES
[facility] Care Coordination Contact    Called member to schedule annual HRA home visit. HRA has been scheduled for Vilma 3 at 1200.  Will contact Jovana Yu for an .  Nereyda was at the adult day care and an  there helped her.  She will have her daughter present for the assessment too..  She is at the adult day [facility] asking for another day.  She currently has 4 days. Explained her assessment is due and it will de determined with the assessment.     Yuly Dotson RN,  PHN  [facility] Coordinator  [phone]

## 2024-05-22 ENCOUNTER — OFFICE VISIT (OUTPATIENT)
Dept: OBGYN | Facility: CLINIC | Age: 74
End: 2024-05-22
Payer: MEDICARE

## 2024-05-22 VITALS
SYSTOLIC BLOOD PRESSURE: 122 MMHG | WEIGHT: 225 LBS | DIASTOLIC BLOOD PRESSURE: 80 MMHG | HEIGHT: 63 IN | BODY MASS INDEX: 39.87 KG/M2

## 2024-05-22 DIAGNOSIS — N85.00 ENDOMETRIAL HYPERPLASIA WITHOUT ATYPIA: Primary | ICD-10-CM

## 2024-05-22 PROCEDURE — 99214 OFFICE O/P EST MOD 30 MIN: CPT | Performed by: FAMILY MEDICINE

## 2024-05-22 RX ORDER — MEGESTROL ACETATE 40 MG/1
40 TABLET ORAL 2 TIMES DAILY
Qty: 180 TABLET | Refills: 3 | Status: SHIPPED | OUTPATIENT
Start: 2024-05-22

## 2024-05-22 NOTE — NURSING NOTE
"Chief Complaint   Patient presents with    Follow Up     EMB       Initial /80 (BP Location: Right arm, Patient Position: Chair, Cuff Size: Adult Large)   Ht 1.6 m (5' 3\")   Wt 102.1 kg (225 lb)   LMP  (LMP Unknown)   Breastfeeding No   BMI 39.86 kg/m   Estimated body mass index is 39.86 kg/m  as calculated from the following:    Height as of this encounter: 1.6 m (5' 3\").    Weight as of this encounter: 102.1 kg (225 lb).  BP completed using cuff size: large    Questioned patient about current smoking habits.  Pt. has never smoked.          The following HM Due: NONE.  Nel Jane CMA    "

## 2024-05-22 NOTE — PROGRESS NOTES
"SUBJECTIVE:  Nereyda Rosa is an 74 year old   woman who presents for   Gyn follow-up exam. She was previously seen for postmenopausal bleeding with thickened endometrium, results showing   Endometrial hyperplasia without atypia.    Past Medical History:   Diagnosis Date    Hypertension           Family History   Problem Relation Age of Onset    Glaucoma No family hx of     Macular Degeneration No family hx of        Past Surgical History:   Procedure Laterality Date    COLONOSCOPY N/A 2023    Procedure: COLONOSCOPY;  Surgeon: Fred Evans MD;  Location:  GI       Current Outpatient Medications   Medication Sig Dispense Refill    famotidine (PEPCID) 40 MG tablet TAKE 1 TABLET(40 MG) BY MOUTH DAILY 90 tablet 0    furosemide (LASIX) 20 MG tablet TAKE 1 TABLET(20 MG) BY MOUTH DAILY 90 tablet 0    naproxen (NAPROSYN) 500 MG tablet TAKE 1 TABLET(500 MG) BY MOUTH TWICE DAILY WITH MEALS 60 tablet 0     No current facility-administered medications for this visit.     Allergies   Allergen Reactions    Chicken Allergy      Rash on shoulders    Food      Allergic to salt per pt       Social History     Tobacco Use    Smoking status: Never     Passive exposure: Never    Smokeless tobacco: Never   Substance Use Topics    Alcohol use: Never       Review Of Systems  Ears/Nose/Throat: negative  Respiratory: No shortness of breath, dyspnea on exertion, cough, or hemoptysis  Cardiovascular: negative  Gastrointestinal: negative  Genitourinary: See HPI   Constitutional, HEENT, cardiovascular, pulmonary, GI, , musculoskeletal, neuro, skin, endocrine and psych systems are negative, except as otherwise noted.      OBJECTIVE:  /80 (BP Location: Right arm, Patient Position: Chair, Cuff Size: Adult Large)   Ht 1.6 m (5' 3\")   Wt 102.1 kg (225 lb)   LMP  (LMP Unknown)   Breastfeeding No   BMI 39.86 kg/m     General appearance: healthy, alert, and no distress  Skin: Skin color, texture, turgor normal. No rashes " or lesions.  Ears: negative  Nose/Sinuses: Nares normal. Septum midline. Mucosa normal. No drainage or sinus tenderness.  Oropharynx: Lips, mucosa, and tongue normal. Teeth and gums normal.  Neck: Neck supple. No adenopathy. Thyroid symmetric, normal size,, Carotids without bruits.  Lungs: negative, Percussion normal. Good diaphragmatic excursion. Lungs clear  Heart: negative, PMI normal. No lifts, heaves, or thrills. RRR. No murmurs, clicks gallops or rub      ASSESSMENT:  Nereyda Rosa is an 74 year old   woman who presents for   Gyn follow-up exam. She was previously seen for postmenopausal bleeding with thickened endometrium, results showing   Endometrial hyperplasia without atypia.    PLAN:    1)  Endometrial hyperplasia without atypia     Discussed the following:    Postmenopausal patients are treated with either progestin therapy or hysterectomy (algorithm 2); the choice depends on the perceived risk of developing endometrial carcinoma based on their personal risk factors (eg, chronic ovulatory dysfunction, obesity, early menarche, late menopause, increasing age, tamoxifen therapy, Roca syndrome, Cowden syndrome (table 2)). (See 'Goal of management' above.)    Most postmenopausal patients are appropriate candidates for progestin therapy, which may be intrauterine or oral; COCs are not used in postmenopausal patients to avoid unnecessary exposure to estrogen, which may result in further risk (eg, venous thromboembolism). Our approach to therapy is described in detail below. (See 'Progestin therapy' below.)    Hysterectomy is curative for EH and is performed in postmenopausal patients in whom progestin therapy is declined or contraindicated, in those with bothersome bleeding, in those at highest risk of developing endometrial carcinoma, or in those who desire definitive therapy. (See 'Hysterectomy' below.)      For postmenopausal patients with a diagnosis of EH without atypia who are treated with  progestins, we repeat the endometrial biopsy every three to six months for up to one year    If biopsy results show:    ?Normal endometrium and:    The patient has no further bleeding, the need for maintenance therapy is based on the presence or absence of risk factors (see 'Goal of management' above). If no risk factors are present, expectant management is reasonable. Endometrial biopsy is advised if abnormal uterine bleeding recurs.    If risk factors are present, progestin therapy (including combined estrogen-progestin contraceptives [MUNIR]) is prescribed indefinitely. (See 'Maintenance therapy' below.)    The patient continues to have postmenopausal bleeding, we perform D&C with hysteroscopy to exclude focal pathology. If the biopsy shows normal endometrium, maintenance progestin therapy is initiated and repeat endometrial biopsy is performed every three to six months for up to one year. (See 'Maintenance therapy' below.)    If any EH (with or without atypia) recurs, hysterectomy is performed.    ?Persistent EH without atypia, we perform D&C with hysteroscopy to exclude more severe EH or endometrial carcinoma.    If EH without atypia is found, a consultation with a gynecologic oncologist and/or a second pathology review to confirm the classification is often warranted. The patient may be treated with hysterectomy or a higher total progestin dose (see 'Maintenance therapy' below). If progestin therapy is chosen, repeat endometrial biopsy is performed every three to six months for up to one to two years.    ?EH with atypia or endometrial carcinoma, hysterectomy is performed.  Postmenopausal patients    ?In the absence of hot flashes, we treat with progestin only and prefer the LNG 52 mg.      Management of persistence or progression on maintenance therapy -- An increase in maintenance therapy is needed when there is progression or persistence of EH. Options include:    ?If the patient has the LNG 52 mg, add an oral  progestin.    ?If the patient is on an oral progestin, the LNG 52 mg can be added, the oral dose increased, or a more potent progestin can be used.    After discussion she desires an oral pill will start megace 40 mg po bid (dosing based on up to date recommendations) x 3 months and to complete repeat endometrial biopsy in 3 - 6 months       Labs were reviewed in [facility]   Imaging was reviewed in Epic   Tests and documents were reviewed.   Discussion of management or test interpretation   Diagnosis or treatment significantly limited by social determinant          Dr. Jovana Pearson,     OB/GYN   [facility]

## 2024-05-22 NOTE — PATIENT INSTRUCTIONS
Return in 3 months     Start medication twice daily      Dr. Jovana Pearson, DO    Obstetrics and Gynecology  [facility] [place] and [place]

## 2024-06-03 ENCOUNTER — PATIENT OUTREACH (OUTPATIENT)
Dept: GERIATRIC MEDICINE | Facility: CLINIC | Age: 74
End: 2024-06-03
Payer: MEDICARE

## 2024-06-03 SDOH — HEALTH STABILITY: PHYSICAL HEALTH: ON AVERAGE, HOW MANY DAYS PER WEEK DO YOU ENGAGE IN MODERATE TO STRENUOUS EXERCISE (LIKE A BRISK WALK)?: 5 DAYS

## 2024-06-03 SDOH — HEALTH STABILITY: PHYSICAL HEALTH: ON AVERAGE, HOW MANY MINUTES DO YOU ENGAGE IN EXERCISE AT THIS LEVEL?: 30 MIN

## 2024-06-03 ASSESSMENT — LIFESTYLE VARIABLES
SKIP TO QUESTIONS 9-10: 1
HOW OFTEN DO YOU HAVE SIX OR MORE DRINKS ON ONE OCCASION: NEVER
AUDIT-C TOTAL SCORE: 0
HOW OFTEN DO YOU HAVE A DRINK CONTAINING ALCOHOL: NEVER
HOW MANY STANDARD DRINKS CONTAINING ALCOHOL DO YOU HAVE ON A TYPICAL DAY: PATIENT DOES NOT DRINK

## 2024-06-03 ASSESSMENT — ACTIVITIES OF DAILY LIVING (ADL): DEPENDENT_IADLS:: CLEANING;COOKING;LAUNDRY;SHOPPING;MEAL PREPARATION;MONEY MANAGEMENT;TRANSPORTATION

## 2024-06-03 ASSESSMENT — SOCIAL DETERMINANTS OF HEALTH (SDOH)
IN A TYPICAL WEEK, HOW MANY TIMES DO YOU TALK ON THE PHONE WITH FAMILY, FRIENDS, OR NEIGHBORS?: MORE THAN THREE TIMES A WEEK
DO YOU BELONG TO ANY CLUBS OR ORGANIZATIONS SUCH AS CHURCH GROUPS UNIONS, FRATERNAL OR ATHLETIC GROUPS, OR SCHOOL GROUPS?: YES
HOW OFTEN DO YOU ATTEND CHURCH OR RELIGIOUS SERVICES?: MORE THAN 4 TIMES PER YEAR
HOW OFTEN DO YOU GET TOGETHER WITH FRIENDS OR RELATIVES?: MORE THAN THREE TIMES A WEEK
HOW OFTEN DO YOU ATTENT MEETINGS OF THE CLUB OR ORGANIZATION YOU BELONG TO?: MORE THAN 4 TIMES PER YEAR

## 2024-06-03 NOTE — PROGRESS NOTES
[facility] Care Coordination Contact    [facility] Home Visit Assessment     Home visit for Health Risk Assessment with Nereyda Rosa completed on Vilma 3, 2024 with her daughter Rosamaria and Julita Fernandez .    Type of residence:: Apartment - handicap accessible  Current living arrangement:: I live alone     Assessment completed with:: Patient, Care Team Member, Children, Other ()    Current Care Plan  Member currently receiving the following home care services:   None  Member currently receiving the following community resources: Day Care, DME, PCA, Housekeeping/Chore Agency, Transportation Services      Medication Review  Medication reconciliation completed in Epic: Yes  Medication set-up & administration: Independent-does not set up.  Self-administers medications.  Medication Risk Assessment Medication (1 or more, place referral to MTM): N/A: No risk factors identified  MTM Referral Placed: No: No risk factors idenified    Mental/Behavioral Health   Depression Screening:   PHQ-2 Total Score (Adult) - Positive if 3 or more points; Administer PHQ-9 if positive: 0       Mental health DX:: No        Falls Assessment:   Fallen 2 or more times in the past year?: yes, This was when she lived in the [address] [facility] home since the move no falls. Denies injuries.  Any fall with injury in the past year?: No    ADL/IADL Dependencies:   Dependent ADLs:: Incontinence, Ambulation-cane, Bathing, Dressing, Grooming  Dependent IADLs:: Cleaning, Cooking, Laundry, Shopping, Meal Preparation, Money Management, Transportation    Health Plan sponsored benefits: [employer]: Shared information regarding preventative health screening and health plan supplemental benefits/incentives. Reviewed medication disposal form.    PCA Assessment completed at visit: Yes Annual PCA assessment indicated 1.75  hours per day of PCA. This is the same as the previous assessment.     Elderly Waiver Eligibility: Yes-will continue  on EW    Care Plan & Recommendations: Nereyda was assisted by housing stabilization to find a 1 level handicap accessible town home.  She lives alone now and would benefit from a lifeline. Her daughter is her PCA and homemaker.     See [facility] for detailed assessment information.    Follow-Up Plan: Member informed of future contact, plan to f/u with member with a 6 month telephone assessment.  Contact information shared with member and family, encouraged member to call with any questions or concerns at any time.    [place] care continuum providers: Please see Snapshot and Care Management Flowsheets for Specific details of care plan.    This CC note routed to PCP, Nelli Conner.    Yuly Dotson, RN,  PHN  [facility] Care Coordinator  [phone]

## 2024-06-03 NOTE — Clinical Note
Alden Conner,  I was able to complete the annual assessment in her new town home which is handicap accessible.  Will add lifeline to her services due to her living alone now.  She is following up with Ob/Gyn for endometrial hyperplasia. She is starting progestin therapy.  No other concerns at this time.  Let me know if you have any questions,  SVETA Emery [phone]

## 2024-06-14 ENCOUNTER — PATIENT OUTREACH (OUTPATIENT)
Dept: GERIATRIC MEDICINE | Facility: CLINIC | Age: 74
End: 2024-06-14
Payer: MEDICARE

## 2024-06-14 NOTE — TELEPHONE ENCOUNTER
[facility] Care Coordination Contact    [facility]:  Emailed required PCA documents to [facility].  Faxed copy of PCA assessment to PCA Agency and mailed copy to member.  Faxed MD Communication to PCP.     Viviana Lomas  Care Management Specialist  [facility]  [phone]

## 2024-06-20 ENCOUNTER — PATIENT OUTREACH (OUTPATIENT)
Dept: GERIATRIC MEDICINE | Facility: CLINIC | Age: 74
End: 2024-06-20
Payer: MEDICARE

## 2024-06-20 NOTE — TELEPHONE ENCOUNTER
[facility] Care Coordination Contact    Received after visit chart from care coordinator.  Completed following tasks: Mailed copy of care plan/support plan to member, Mailed [place] Cho[place] signature sheet pages 3-4, Mailed Safe Medication Disposal , Submitted referrals/auths for homemaking, ADC, ADC transportation, PERS monthly fee, and PERS installation/testing, and Updated services in Database   and Provider Signature - No POC Shared:  Member indicates that they do not want their POC shared with any EW providers.    Viivana Lomas  Care Management Specialist  [facility]  [phone]

## 2024-06-20 NOTE — LETTER
June 20, 2024    YANDEL LAY  [address] [place] [address]        Dear Yandel:    At [facility], we re dedicated to improving your health and wellness. Enclosed is the Care Plan developed with you on 06/03/2024. Please review the Care Plan carefully.    As a reminder, during your visit we talked about:  Ways to manage your physical and mental health  Using health care to maintain and improve your health   Your preventive care needs     Remember to contact your care coordinator if you:  Are hospitalized, or plan to be hospitalized   Have a fall    Have a change in your physical or mental health  Need help finding support or services    If you have questions, or don t agree with your Care Plan, call me at [phone]. You can also call me if your needs change. TTY users, call the [place] [employer] at (089) or 1-871.114.3487 (miserz-su-fyppoc relay service).    Sincerely,        Yuly Pink RN, PHN  [phone]  [email or web address]    [id number] accepted    [id number] (07/2022)

## 2024-06-26 ENCOUNTER — PATIENT OUTREACH (OUTPATIENT)
Dept: GERIATRIC MEDICINE | Facility: CLINIC | Age: 74
End: 2024-06-26
Payer: MEDICARE

## 2024-06-26 NOTE — PROGRESS NOTES
[facility] Care Coordination Contact    Received a request to submit a DTR for the terminated of Lifeline. Documentation completed and faxed to the health plan. Care Coordinator aware.      Jumana Cooper RN  Utilization   [facility]  [phone]

## 2024-06-26 NOTE — PROGRESS NOTES
[facility] Care Coordination Contact    Annual assessment was completed 6/2/24. At that time Nereyda agreed to having life line with [facility] due to living alone.     Brie, daughter called and said Nereyda changed her mind. [facility] called about the cancellation. DTR email completed. Updated the Support Plan. Notified the PCP.     Yuly Dotson RN,  PHN  [facility] Care Coordinator  [phone]

## 2024-06-26 NOTE — Clinical Note
Nereyda Orellana Dr changed her mind and does not want the lifeline. Request completed.  Thanks,  Yuly
Patient declines

## 2024-07-17 PROBLEM — G89.29 CHRONIC PAIN OF BOTH KNEES: Status: RESOLVED | Noted: 2023-07-25 | Resolved: 2024-07-17

## 2024-07-17 PROBLEM — M25.562 CHRONIC PAIN OF BOTH KNEES: Status: RESOLVED | Noted: 2023-07-25 | Resolved: 2024-07-17

## 2024-07-17 PROBLEM — M25.561 CHRONIC PAIN OF BOTH KNEES: Status: RESOLVED | Noted: 2023-07-25 | Resolved: 2024-07-17

## 2024-07-17 NOTE — PROGRESS NOTES
PT DISCHARGE  Reason for Discharge: Patient has not made expected progress due to interrupted treatment attendance.  Patient has failed to schedule further appointments.    Discharge Plan: Patient to continue home program independently. Return to PT when able to have consistent transportation.     Referring Provider:  Geronimo Cooley       03/08/24 0500   Appointment Info   Signing clinician's name / credentials Beverly Patel PT, DPT   Total/Authorized Visits 10   Visits Used 4   Quick Adds Certification   Progress Note/Certification   Start of Care Date 02/16/24   Onset of illness/injury or Date of Surgery 02/12/24   Therapy Frequency 1x/week   Predicted Duration 10 weeks   Certification date from 02/16/24   Certification date to 04/26/24       Present Yes    Language Icelandic    ID or First/Last Name virtual: Roberta   PT Goal 1   Goal Identifier stairs   Goal Description pt able to ascend /descend 7 stairs pain level 2   Rationale to maximize safety and independence with performance of ADLs and functional tasks;to maximize safety and independence within the home;to maximize safety and independence within the community;to maximize safety and independence with transportation;to maximize safety and independence with self cares   Goal Progress Not Met 3/8/24: continues to have pain with ascending and descending stairs throughout the day to access all portions of her [facility]   Date Met 04/26/24   Subjective Report   Subjective Report Pt reports that she is not doing well this week. She has experienced a fall when she went to climb the stairs in her home. She has noticed that it is getting more difficulty for her to sustain stair climbing activity throughout the day without increased in L knee pain. Notes that Ramadan is starting which will make her transportation to appointments more challenging.   Therapeutic Procedure/Exercise   Therapeutic Procedures: strength, endurance,  "ROM, flexibility minutes (08613) 26   Ther Proc 1 L knee PROM   Ther Proc 1 - Details 5 minutes in flexion and to full extension; pt noted this movement helping her relax   PTRx Ther Proc 1 Ankle Movements Sitting   PTRx Ther Proc 1 - Details reviewed 10x each ankle   PTRx Ther Proc 2 Short Arc Knee Extension   PTRx Ther Proc 2 - Details reviewed 10x each LE   PTRx Ther Proc 3 Seated Hip Flexion   PTRx Ther Proc 3 - Details HEP NC during today's session   PTRx Ther Proc 4 Short Arc Knee Extension (Long Sitting)   PTRx Ther Proc 4 - Details 3x5 reps with CGA from therapist for guidance through the movement   PTRx Ther Proc 5 Supine Heel Slides   PTRx Ther Proc 5 - Details x20 with towel assistance   PTRx Ther Proc 6 Towel Stretch Gastroc   PTRx Ther Proc 6 - Details HEP for 1 minute hold with belt to improve tolerance compared to standing gastroc stretch or seated hamstring stretch. Reviewed to differentiate between heel slide and the stretch.   Skilled Intervention review of HEP, modifications for improved tolerance   Patient Response/Progress tolerate, but increase muscular tension and edema in LE today   Gait Training   Gait Training Minutes, includes stair climbing (11982) 9   Gait 1 Stair Climbing   Gait 1 - Details education, demo and short practice related to stair climbing ascending with R LE leading and L LE descending first instead of using reciprocal patterns when fatigued and painful. Education provided to improve her tolerance when going upstairs to the bathroom.   Patient Response/Progress verbalized understanding, but continues to demosntrate difficulty   Manual Therapy   Manual Therapy: Mobilization, MFR, MLD, friction massage minutes (40932) 8   Manual Therapy 1 Soft tissue mobilization   Manual Therapy 1 - Details completed anterior and posterior L knee superior and inferior to knee. Lateral musculature superior to knee was TTP but tolerated light pressure stating that the mobilization \"hurts so " "good\"   Skilled Intervention skilled intervention completed for mobilization and pain modulation   Patient Response/Progress tolerating, notes increased symptoms with pressure then relief after completion of mobilization   Education   Learner/Method Patient;Listening;Demonstration   Education Comments stair climbing education, managment of symptoms throughout the day with activity modifications   Plan   Home program printouts provided at previous sessions   Plan for next session follow up on stair climbing, re-assess LE edema for changes, progress as tolerated   Total Session Time   Timed Code Treatment Minutes 43   Total Treatment Time (sum of timed and untimed services) 43         "

## 2024-08-12 DIAGNOSIS — R60.0 BILATERAL LEG EDEMA: ICD-10-CM

## 2024-08-12 NOTE — TELEPHONE ENCOUNTER
Medication passed protocol, however, refill RN could not approve because of the medication warning/interaction. Provider, please approve or deny the prescription.

## 2024-08-15 RX ORDER — FUROSEMIDE 20 MG
20 TABLET ORAL DAILY
Qty: 30 TABLET | Refills: 0 | Status: SHIPPED | OUTPATIENT
Start: 2024-08-15

## 2024-09-09 ENCOUNTER — HOSPITAL ENCOUNTER (EMERGENCY)
Facility: CLINIC | Age: 74
Discharge: HOME | End: 2024-09-09
Attending: EMERGENCY MEDICINE | Admitting: EMERGENCY MEDICINE
Payer: MEDICARE

## 2024-09-09 ENCOUNTER — APPOINTMENT (OUTPATIENT)
Dept: GENERAL RADIOLOGY | Facility: CLINIC | Age: 74
End: 2024-09-09
Attending: EMERGENCY MEDICINE
Payer: MEDICARE

## 2024-09-09 ENCOUNTER — APPOINTMENT (OUTPATIENT)
Dept: CT IMAGING | Facility: CLINIC | Age: 74
End: 2024-09-09
Attending: EMERGENCY MEDICINE
Payer: MEDICARE

## 2024-09-09 VITALS
DIASTOLIC BLOOD PRESSURE: 58 MMHG | OXYGEN SATURATION: 94 % | SYSTOLIC BLOOD PRESSURE: 96 MMHG | RESPIRATION RATE: 18 BRPM | HEART RATE: 69 BPM | TEMPERATURE: 98.1 F

## 2024-09-09 DIAGNOSIS — M54.50 ACUTE RIGHT-SIDED LOW BACK PAIN WITHOUT SCIATICA: ICD-10-CM

## 2024-09-09 DIAGNOSIS — K76.0 HEPATIC STEATOSIS: ICD-10-CM
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
## 2024-09-09 DIAGNOSIS — M79.604 RIGHT LEG PAIN: ICD-10-CM

## 2024-09-09 DIAGNOSIS — R42 LIGHTHEADEDNESS: ICD-10-CM

## 2024-09-09 LAB
ALBUMIN SERPL BCG-MCNC: 4.3 G/DL (ref 3.5–5.2)
ALBUMIN UR-MCNC: NEGATIVE MG/DL
ALP SERPL-CCNC: 82 U/L (ref 40–150)
ALT SERPL W P-5'-P-CCNC: 14 U/L (ref 0–50)
ANION GAP SERPL CALCULATED.3IONS-SCNC: 10 MMOL/L (ref 7–15)
APPEARANCE UR: CLEAR
AST SERPL W P-5'-P-CCNC: 18 U/L (ref 0–45)
BASOPHILS # BLD AUTO: 0 10E3/UL (ref 0–0.2)
BASOPHILS NFR BLD AUTO: 1 %
BILIRUB SERPL-MCNC: 0.3 MG/DL
BILIRUB UR QL STRIP: NEGATIVE
BUN SERPL-MCNC: 16.8 MG/DL (ref 8–23)
CALCIUM SERPL-MCNC: 9.6 MG/DL (ref 8.8–10.4)
CHLORIDE SERPL-SCNC: 102 MMOL/L (ref 98–107)
COLOR UR AUTO: ABNORMAL
CREAT SERPL-MCNC: 0.72 MG/DL (ref 0.51–0.95)
EGFRCR SERPLBLD CKD-EPI 2021: 86 ML/MIN/1.73M2
EOSINOPHIL # BLD AUTO: 0.2 10E3/UL (ref 0–0.7)
EOSINOPHIL NFR BLD AUTO: 5 %
ERYTHROCYTE [DISTWIDTH] IN BLOOD BY AUTOMATED COUNT: 12.6 % (ref 10–15)
GLUCOSE SERPL-MCNC: 112 MG/DL (ref 70–99)
GLUCOSE UR STRIP-MCNC: NEGATIVE MG/DL
HCO3 SERPL-SCNC: 25 MMOL/L (ref 22–29)
HCT VFR BLD AUTO: 40.3 % (ref 35–47)
HGB BLD-MCNC: 13.1 G/DL (ref 11.7–15.7)
HGB UR QL STRIP: NEGATIVE
HOLD SPECIMEN: NORMAL
HOLD SPECIMEN: NORMAL
IMM GRANULOCYTES # BLD: 0 10E3/UL
IMM GRANULOCYTES NFR BLD: 1 %
KETONES UR STRIP-MCNC: NEGATIVE MG/DL
LEUKOCYTE ESTERASE UR QL STRIP: NEGATIVE
LYMPHOCYTES # BLD AUTO: 1.1 10E3/UL (ref 0.8–5.3)
LYMPHOCYTES NFR BLD AUTO: 28 %
MCH RBC QN AUTO: 29.3 PG (ref 26.5–33)
MCHC RBC AUTO-ENTMCNC: 32.5 G/DL (ref 31.5–36.5)
MCV RBC AUTO: 90 FL (ref 78–100)
MONOCYTES # BLD AUTO: 0.5 10E3/UL (ref 0–1.3)
MONOCYTES NFR BLD AUTO: 11 %
NEUTROPHILS # BLD AUTO: 2.2 10E3/UL (ref 1.6–8.3)
NEUTROPHILS NFR BLD AUTO: 55 %
NITRATE UR QL: NEGATIVE
NRBC # BLD AUTO: 0 10E3/UL
NRBC BLD AUTO-RTO: 0 /100
PH UR STRIP: 5.5 [PH] (ref 5–7)
PLATELET # BLD AUTO: 190 10E3/UL (ref 150–450)
POTASSIUM SERPL-SCNC: 3.9 MMOL/L (ref 3.4–5.3)
PROT SERPL-MCNC: 7.5 G/DL (ref 6.4–8.3)
RBC # BLD AUTO: 4.47 10E6/UL (ref 3.8–5.2)
RBC URINE: <1 /HPF
SODIUM SERPL-SCNC: 137 MMOL/L (ref 135–145)
SP GR UR STRIP: 1.01 (ref 1–1.03)
SQUAMOUS EPITHELIAL: 2 /HPF
TROPONIN T SERPL HS-MCNC: <6 NG/L
UROBILINOGEN UR STRIP-MCNC: NORMAL MG/DL
WBC # BLD AUTO: 4 10E3/UL (ref 4–11)
WBC URINE: <1 /HPF

## 2024-09-09 PROCEDURE — 72131 CT LUMBAR SPINE W/O DYE: CPT | Mod: ME

## 2024-09-09 PROCEDURE — 250N000011 HC RX IP 250 OP 636: Performed by: EMERGENCY MEDICINE

## 2024-09-09 PROCEDURE — 73502 X-RAY EXAM HIP UNI 2-3 VIEWS: CPT

## 2024-09-09 PROCEDURE — 93005 ELECTROCARDIOGRAM TRACING: CPT

## 2024-09-09 PROCEDURE — 82040 ASSAY OF SERUM ALBUMIN: CPT | Performed by: EMERGENCY MEDICINE

## 2024-09-09 PROCEDURE — 96376 TX/PRO/DX INJ SAME DRUG ADON: CPT

## 2024-09-09 PROCEDURE — 258N000003 HC RX IP 258 OP 636: Performed by: EMERGENCY MEDICINE

## 2024-09-09 PROCEDURE — 84484 ASSAY OF TROPONIN QUANT: CPT | Performed by: EMERGENCY MEDICINE

## 2024-09-09 PROCEDURE — 73590 X-RAY EXAM OF LOWER LEG: CPT | Mod: RT

## 2024-09-09 PROCEDURE — 99285 EMERGENCY DEPT VISIT HI MDM: CPT | Mod: 25

## 2024-09-09 PROCEDURE — 96375 TX/PRO/DX INJ NEW DRUG ADDON: CPT

## 2024-09-09 PROCEDURE — 96374 THER/PROPH/DIAG INJ IV PUSH: CPT | Mod: 59

## 2024-09-09 PROCEDURE — 250N000013 HC RX MED GY IP 250 OP 250 PS 637: Performed by: EMERGENCY MEDICINE

## 2024-09-09 PROCEDURE — 85041 AUTOMATED RBC COUNT: CPT | Performed by: EMERGENCY MEDICINE

## 2024-09-09 PROCEDURE — 74177 CT ABD & PELVIS W/CONTRAST: CPT | Mod: MG

## 2024-09-09 PROCEDURE — 81001 URINALYSIS AUTO W/SCOPE: CPT | Performed by: EMERGENCY MEDICINE

## 2024-09-09 PROCEDURE — 96361 HYDRATE IV INFUSION ADD-ON: CPT

## 2024-09-09 PROCEDURE — 73560 X-RAY EXAM OF KNEE 1 OR 2: CPT | Mod: RT

## 2024-09-09 PROCEDURE — 36415 COLL VENOUS BLD VENIPUNCTURE: CPT | Performed by: EMERGENCY MEDICINE

## 2024-09-09 RX ORDER — ONDANSETRON 2 MG/ML
4 INJECTION INTRAMUSCULAR; INTRAVENOUS ONCE
Status: COMPLETED | OUTPATIENT
Start: 2024-09-09 | End: 2024-09-09

## 2024-09-09 RX ORDER — OXYCODONE HYDROCHLORIDE 5 MG/1
5 TABLET ORAL EVERY 6 HOURS PRN
Qty: 12 TABLET | Refills: 0 | Status: SHIPPED | OUTPATIENT
Start: 2024-09-09 | End: 2024-09-12

## 2024-09-09 RX ORDER — HYDROMORPHONE HYDROCHLORIDE 1 MG/ML
0.5 INJECTION, SOLUTION INTRAMUSCULAR; INTRAVENOUS; SUBCUTANEOUS ONCE
Status: COMPLETED | OUTPATIENT
Start: 2024-09-09 | End: 2024-09-09

## 2024-09-09 RX ORDER — ACETAMINOPHEN 325 MG/1
975 TABLET ORAL ONCE
Status: COMPLETED | OUTPATIENT
Start: 2024-09-09 | End: 2024-09-09

## 2024-09-09 RX ORDER — IOPAMIDOL 755 MG/ML
500 INJECTION, SOLUTION INTRAVASCULAR ONCE
Status: COMPLETED | OUTPATIENT
Start: 2024-09-09 | End: 2024-09-09

## 2024-09-09 RX ORDER — IBUPROFEN 600 MG/1
600 TABLET, FILM COATED ORAL ONCE
Status: COMPLETED | OUTPATIENT
Start: 2024-09-09 | End: 2024-09-09

## 2024-09-09 RX ADMIN — IOPAMIDOL 100 ML: 755 INJECTION, SOLUTION INTRAVENOUS at 19:44

## 2024-09-09 RX ADMIN — ONDANSETRON 4 MG: 2 INJECTION INTRAMUSCULAR; INTRAVENOUS at 20:48

## 2024-09-09 RX ADMIN — HYDROMORPHONE HYDROCHLORIDE 1 MG: 1 INJECTION, SOLUTION INTRAMUSCULAR; INTRAVENOUS; SUBCUTANEOUS at 18:57

## 2024-09-09 RX ADMIN — ACETAMINOPHEN 325MG 975 MG: 325 TABLET ORAL at 15:27

## 2024-09-09 RX ADMIN — SODIUM CHLORIDE 1000 ML: 9 INJECTION, SOLUTION INTRAVENOUS at 17:00

## 2024-09-09 RX ADMIN — HYDROMORPHONE HYDROCHLORIDE 0.5 MG: 1 INJECTION, SOLUTION INTRAMUSCULAR; INTRAVENOUS; SUBCUTANEOUS at 17:00

## 2024-09-09 ASSESSMENT — COLUMBIA-SUICIDE SEVERITY RATING SCALE - C-SSRS
2. HAVE YOU ACTUALLY HAD ANY THOUGHTS OF KILLING YOURSELF IN THE PAST MONTH?: NO
1. IN THE PAST MONTH, HAVE YOU WISHED YOU WERE DEAD OR WISHED YOU COULD GO TO SLEEP AND NOT WAKE UP?: NO
6. HAVE YOU EVER DONE ANYTHING, STARTED TO DO ANYTHING, OR PREPARED TO DO ANYTHING TO END YOUR LIFE?: NO

## 2024-09-09 ASSESSMENT — ACTIVITIES OF DAILY LIVING (ADL)
ADLS_ACUITY_SCORE: 35

## 2024-09-09 NOTE — ED PROVIDER NOTES
Emergency Department Note      History of Present Illness     Chief Complaint   Fall      HPI   Nereyda Rosa is a 74 year old female who presents for evaluation of injury sustained after fall.  She arrives via EMS.  She reports that she was in the bathroom when she became lightheaded and lost her balance and fell over.  She landed on her right side.  He is having pain in her lower back, right hip, and entirety of the right leg.  She not hit her head or have loss of consciousness.  No neck pain.  She is acting appropriately per her daughter is present.  She been having longstanding lightheadedness and dizziness for the last 2 months.  She does not have a sensation of the room is spinning.    Independent Historian   None    Review of External Notes       Past Medical History     Medical History and Problem List   Past Medical History:   Diagnosis Date    Hypertension        Medications   famotidine (PEPCID) 40 MG tablet  furosemide (LASIX) 20 MG tablet  megestrol (MEGACE) 40 MG tablet  naproxen (NAPROSYN) 500 MG tablet        Surgical History   Past Surgical History:   Procedure Laterality Date    COLONOSCOPY N/A 5/19/2023    Procedure: COLONOSCOPY;  Surgeon: Fred Evans MD;  Location:  GI       Physical Exam     Patient Vitals for the past 24 hrs:   BP Temp Pulse Resp SpO2   09/09/24 1525 -- -- -- 18 --   09/09/24 1518 (!) 160/75 98.1  F (36.7  C) 74 -- 93 %     Physical Exam  Constitutional: Well appearing.  HEENT: Atraumatic.  PERRL.  EOMI.  Moist mucous membranes.  Neck: Soft.  Supple.   Cardiac: Regular rate and rhythm.  No murmur or rub.  Respiratory: Clear to auscultation bilaterally.  No respiratory distress.  No wheezing, rhonchi, or rales.  Abdomen: Soft and nontender.  No guarding.  Nondistended.  Musculoskeletal: No obvious deformity of extremities.  Distal pulses intact.  Cap refill less than 3 seconds throughout.  No edema.  Normal range of motion.  Neurologic: Alert and oriented x3.  Normal  tone and bulk.    No facial drooping.  Normal speech.  5/5 strength in bilateral upper and lower extremities.  Sensation to light touch intact throughout.  Normal gait.  Skin: No rashes.  No edema.  Psych: Normal affect.  Normal behavior.              Diagnostics     Lab Results   Labs Ordered and Resulted from Time of ED Arrival to Time of ED Departure   COMPREHENSIVE METABOLIC PANEL - Abnormal       Result Value    Sodium 137      Potassium 3.9      Carbon Dioxide (CO2) 25      Anion Gap 10      Urea Nitrogen 16.8      Creatinine 0.72      GFR Estimate 86      Calcium 9.6      Chloride 102      Glucose 112 (*)     Alkaline Phosphatase 82      AST 18      ALT 14      Protein Total 7.5      Albumin 4.3      Bilirubin Total 0.3     ROUTINE UA WITH MICROSCOPIC REFLEX TO CULTURE - Abnormal    Color Urine Straw      Appearance Urine Clear      Glucose Urine Negative      Bilirubin Urine Negative      Ketones Urine Negative      Specific Gravity Urine 1.007      Blood Urine Negative      pH Urine 5.5      Protein Albumin Urine Negative      Urobilinogen Urine Normal      Nitrite Urine Negative      Leukocyte Esterase Urine Negative      RBC Urine <1      WBC Urine <1      Squamous Epithelials Urine 2 (*)    TROPONIN T, HIGH SENSITIVITY - Normal    Troponin T, High Sensitivity <6     CBC WITH PLATELETS AND DIFFERENTIAL    WBC Count 4.0      RBC Count 4.47      Hemoglobin 13.1      Hematocrit 40.3      MCV 90      MCH 29.3      MCHC 32.5      RDW 12.6      Platelet Count 190      % Neutrophils 55      % Lymphocytes 28      % Monocytes 11      % Eosinophils 5      % Basophils 1      % Immature Granulocytes 1      NRBCs per 100 WBC 0      Absolute Neutrophils 2.2      Absolute Lymphocytes 1.1      Absolute Monocytes 0.5      Absolute Eosinophils 0.2      Absolute Basophils 0.0      Absolute Immature Granulocytes 0.0      Absolute NRBCs 0.0         Imaging   CT Abdomen Pelvis w Contrast   Final Result   IMPRESSION:    1.  No  acute findings in the abdomen and pelvis.   2.  Mild diffuse hepatic steatosis.    3.  Mild stable bile duct dilatation with no radiodense stone or mass probably relates to reservoir effect from prior cholecystectomy.   4.  Small esophageal hiatal hernia.   5.  Advanced degenerative disc disease L4-5 interspace.      XR Tibia and Fibula Right 2 Views   Final Result   IMPRESSION: Chronic appearing cortical irregularity at the lateral tibial plateau/condyle. No knee joint effusion. Mild to moderate tricompartmental degenerative changes of the knee.      XR Pelvis and Hip Right 1 View   Final Result   IMPRESSION: No acute fracture or malalignment. There is normal hip joint spacing bilaterally. Moderate degenerative changes of the lumbar spine.      XR Knee Right 1/2 Views   Final Result   IMPRESSION: Chronic appearing cortical irregularity at the lateral tibial plateau/condyle. No knee joint effusion. Mild to moderate tricompartmental degenerative changes of the knee.      CT Lumbar Spine w/o Contrast   Final Result   IMPRESSION:   1.  No acute fracture.   2.  At least moderate degenerative spinal canal and foraminal stenosis   at L4-L5.      AIDEE JONES MD            SYSTEM ID:  [id number]          EKG   ECG results from 09/09/24   EKG 12-lead, tracing only     Value    Systolic Blood Pressure     Diastolic Blood Pressure     Ventricular Rate 75    Atrial Rate 75    UT Interval 152    QRS Duration 72        QTc 419    P Axis 42    R AXIS 24    T Axis 28    Interpretation ECG      Sinus rhythm  Normal ECG  When compared with ECG of 02-Oct-2023 18:40,  No significant change was found  Confirmed by - EMERGENCY ROOM, PHYSICIAN ([id number]),  ROSA CASTILLO ([id number]) on 9/10/2024 6:38:48 AM           Independent Interpretation   None    ED Course      Medications Administered   Medications   sodium chloride 0.9% BOLUS 1,000 mL (has no administration in time range)   HYDROmorphone (PF) (DILAUDID) injection 0.5  mg (has no administration in time range)   acetaminophen (TYLENOL) tablet 975 mg (975 mg Oral $Given 9/9/24 1527)     Or   ibuprofen (ADVIL/MOTRIN) tablet 600 mg ( Oral See Alternative 9/9/24 1527)       Procedures   Procedures     Discussion of Management   None    ED Course        Additional Documentation  None    Medical Decision Making / Diagnosis     CMS Diagnoses: None    MIPS       None    MDM   Nereyda Rosa is a 74 year old female who is afebrile and hemodynamically stable.  She states she has been lightheaded for the last month or so and exhibits no focal neurologic deficits.  Workup as noted as above and is unrevealing including EKG.  Thankfully, imaging of her leg and back revealed no traumatic injury.  Discussed her spinal stenosis that could have been exacerbated and likely causing her radicular pain in right leg for the last month or so.  I discussed plan for follow-up with neurosurgery in the clinic she is up and ambulating and feels very safe discharging home think is reasonable.  She has no red flag back pain symptoms that would necessitate emergent imaging such as an MRI.  She is neurologically intact.  Strict return precautions were given.  Recommended she follow closely with her primary care physician for her ongoing lightheadedness.  Her questions were answered and she was in no distress at time of discharge.    Disposition   The patient was discharged.     Diagnosis     ICD-10-CM    1. Right leg pain  M79.604       2. Acute right-sided low back pain without sciatica  M54.50       3. Fall, initial encounter  W19.XXXA       4. Lightheadedness  R42       5. Hepatic steatosis  K76.0            Discharge Medications   New Prescriptions    No medications on file         MD Isael Leyva Nicholas J, MD  09/17/24 1953

## 2024-09-09 NOTE — ED NOTES
Pt ambulated to bathroom w/ two assist. Pt complains of dizziness and pain w/ ambulation. Pt appear steady on feet. MD Kirkland notified.

## 2024-09-09 NOTE — ED TRIAGE NOTES
Pt arrives via EMS. Pt was in bathroom at around 1000 today, says she twisted her right leg and fell onto her right hip and knee. Felt dizzy beforehand, but states she has felt that way for 2 months. Endorses numbness and tingling down right leg. A&Ox4.

## 2024-09-10 ENCOUNTER — PATIENT OUTREACH (OUTPATIENT)
Dept: GERIATRIC MEDICINE | Facility: CLINIC | Age: 74
End: 2024-09-10
Payer: MEDICARE

## 2024-09-10 LAB
ATRIAL RATE - MUSE: 75 BPM
DIASTOLIC BLOOD PRESSURE - MUSE: NORMAL MMHG
INTERPRETATION ECG - MUSE: NORMAL
P AXIS - MUSE: 42 DEGREES
PR INTERVAL - MUSE: 152 MS
QRS DURATION - MUSE: 72 MS
QT - MUSE: 376 MS
QTC - MUSE: 419 MS
R AXIS - MUSE: 24 DEGREES
SYSTOLIC BLOOD PRESSURE - MUSE: NORMAL MMHG
T AXIS - MUSE: 28 DEGREES
VENTRICULAR RATE- MUSE: 75 BPM

## 2024-09-10 NOTE — PHARMACY-ADMISSION MEDICATION HISTORY
Pharmacist Admission Medication History    Admission medication history is complete. The information provided in this note is only as accurate as the sources available at the time of the update.    Information Source(s): Family member via in-person    Pertinent Information: Daughter Rosamaria translated and provided medication list    Changes made to PTA medication list:  Added: None  Deleted: None  Changed: None    Allergies reviewed with patient and updates made in EHR: no    Medication History Completed By: Viviana Hernandez RPH 9/9/2024 8:39 PM    PTA Med List   Medication Sig Last Dose    famotidine (PEPCID) 40 MG tablet TAKE 1 TABLET(40 MG) BY MOUTH DAILY 9/9/2024    furosemide (LASIX) 20 MG tablet TAKE 1 TABLET(20 MG) BY MOUTH DAILY 9/8/2024    megestrol (MEGACE) 40 MG tablet Take 1 tablet (40 mg) by mouth 2 times daily 9/8/2024    naproxen (NAPROSYN) 500 MG tablet TAKE 1 TABLET(500 MG) BY MOUTH TWICE DAILY WITH MEALS 9/8/2024    oxyCODONE (ROXICODONE) 5 MG tablet Take 1 tablet (5 mg) by mouth every 6 hours as needed for pain.

## 2024-09-10 NOTE — DISCHARGE INSTRUCTIONS
You have chronic degenerative changes, with age and wear-and-tear in your back.  Please follow-up with your doctor or the orthopedic clinic as you desire.    Discharge Instructions  Back Pain  You were seen today for back pain. Back pain can have many causes, but most will get better without surgery or other specific treatment. Sometimes there is a herniated ( slipped ) disc. We do not usually do MRI scans to look for these right away, since most herniated discs will get better on their own with time.  Today, we did not find any evidence that your back pain was caused by a serious condition. However, sometimes symptoms develop over time and cannot be found during an emergency visit, so it is very important that you follow up with your primary provider.  Generally, every Emergency Department visit should have a follow-up clinic visit with either a primary or a specialty clinic/provider. Please follow-up as instructed by your emergency provider today.    Return to the Emergency Department if:  You develop a fever with your back pain.   You have weakness or change in sensation in one or both legs.  You lose control of your bowels or bladder, or cannot empty your bladder (cannot pee).  Your pain gets much worse.     Follow-up with your provider:  Unless your pain has completely gone away, please make an appointment with your provider within one week. Most of the routine care for back pain is available in a clinic and not the Emergency Department. You may need further management of your back pain, such as more pain medication, imaging such as an X-ray or MRI, or physical therapy.    What can I do to help myself?  Remain Active -- People are often afraid that they will hurt their back further or delay recovery by remaining active, but this is one of the best things you can do for your back. In fact, staying in bed for a long time to rest is not recommended. Studies have shown that people with low back pain recover faster  when they remain active. Movement helps to bring blood flow to the muscles and relieve muscle spasms as well as preventing loss of muscle strength.  Heat -- Using a heating pad can help with low back pain during the first few weeks. Do not sleep with a heating pad, as you can be burned.   Pain medications - You may take a pain medication such as Tylenol  (acetaminophen), Advil , Motrin  (ibuprofen) or Aleve  (naproxen).  If you were given a prescription for medicine here today, be sure to read all of the information (including the package insert) that comes with your prescription.  This will include important information about the medicine, its side effects, and any warnings that you need to know about.  The pharmacist who fills the prescription can provide more information and answer questions you may have about the medicine.  If you have questions or concerns that the pharmacist cannot address, please call or return to the Emergency Department.   Remember that you can always come back to the Emergency Department if you are not able to see your regular provider in the amount of time listed above, if you get any new symptoms, or if there is anything that worries you.    Discharge Instructions  Extremity Injury    You were seen today for an injury to an extremity (arm, hand, leg, or foot). You may have a bruise, strain, or fracture (broken bone).    Generally, every Emergency Department visit should have a follow-up clinic visit with either a primary or a specialty clinic/provider. Please follow-up as instructed by your emergency provider today.  Return to the Emergency Department right away if:  Your pain seems to change or get worse or there is pain in a new area that wasn t evaluated today.  Your extremity becomes pale, cool, blue, or numb or tingling past the injury.  You have more drainage, redness or pain in the area of the cut or abrasion.  You have pain that you cannot control with the medicine recommended or  prescribed here, or you have pain that seems too much for your injury.  Your child (who is injured) will not stop crying or is much more fussy than normal.  You have new symptoms or anything that worries you.    What to Expect:  Your swelling and pain may be worse the day after your injury, but should not be severe and should start getting better after that. You should not have new symptoms and your pain should not get worse.  You may start to get a bruise over the injured area or below the injured area (bruising can follow gravity).  Your movement and strength should get better with time.  Some injuries may not show up until after you have left the Emergency Department so it is important to follow-up as directed.  Your injury may prevent you from working.  Follow-up with your regular provider to get a work release note.  Pain medications or your injury may make it unsafe to drive or operate machinery.    Home Care:  RICE: Rest, Ice, Compression, Elevation  Rest: Rest your injured area for at least 1-2 days. After that you may start using your extremity again as long as there is not too much pain.   Ice: Apply ice your injured area for 15 minutes at a time, at least 3 times a day. Use a cloth between the ice bag and your skin to prevent frostbite. Do not sleep with an ice pack or heating pad on, since this can cause burns or skin injury.  Compression: You may use an elastic bandage (Ace  Wrap) if it makes you more comfortable. Wrap it just tight enough to provide light compression, like a new pair of socks feels. Loosen the bandage if you have swelling past the bandage.  Elevation: Raise the injured area above the level of your heart as much as possible in the first 1-2 days.    Use Tylenol  (acetaminophen), Motrin (ibuprofen), or Advil  (ibuprofen) for your pain unless you have an allergy or are told not to use these medications by your provider.  Take the medications as instructed on the package. Tylenol   (acetaminophen) is in many prescription medicines and non-prescription medicines--check all of your medicines to be sure you aren t taking more than 3000 mg per day.  Please follow any other instructions that were discussed with you by your provider.    Stretching/Exercises:  You may have been provided with instructions for stretching or exercises. If your injury was to your arm or shoulder and your provider put you in a sling or an immobilizer, it is important that you take off your immobilizer within 3 days and stretch/move your shoulder, unless your provider specifically tells you to not move your shoulder.  This is to prevent further injury such as a  frozen shoulder .     If you were given a prescription for medicine here today, be sure to read all of the information (including the package insert) that comes with your prescription.  This will include important information about the medicine, its side effects, and any warnings that you need to know about.  The pharmacist who fills the prescription can provide more information and answer questions you may have about the medicine.  If you have questions or concerns that the pharmacist cannot address, please call or return to the Emergency Department.     Remember that you can always come back to the Emergency Department if you are not able to see your regular provider in the amount of time listed above, if you get any new symptoms, or if there is anything that worries you.  Opioid Medication Information    You have been given a prescription for an opioid (narcotic) pain medicine and/or have received a pain medicine while here in the Emergency Department. These medicines can make you drowsy or impaired. You must not drive, operate dangerous equipment, or engage in any other dangerous activities while taking these medications. If you drive while taking these medications, you could be arrested for driving under the influence (DUI). Do not drink any alcohol while you  are taking these medications.     Opioid pain medications can cause addiction. If you have a history of chemical dependency of any type, you are at a higher risk of becoming addicted to pain medications.  Only take these prescribed medications to treat your pain when all other options have been tried. Take it for as short a time and as few doses as possible. Store your pain pills in a secure place, as they are frequently stolen and provide a dangerous opportunity for children or visitors in your house to start abusing these powerful medications. We will not replace any lost or stolen medicine.    If you do not finish your medication, it is a good idea to get rid of it but please do not flush it down the toilet. Please dispose of the remaining medication at a local pharmacy or law enforcement facility. The [place] [employer] has additional information on medication disposal: https://www.pca.[facility].[place]/[employer]-green/managing-unwanted-medications.      Many prescription pain medications contain Tylenol  (acetaminophen), including Vicodin , Tylenol #3 , Norco , Lortab , and Percocet .  You should not take any extra pills of Tylenol  if you are using these prescription medications or you can get very sick.  Do not ever take more than 3000 mg of acetaminophen in any 24 hour period.    All opioids tend to cause constipation. Drink plenty of water and eat foods that have a lot of fiber, such as fruits, vegetables, prune juice, apple juice and high fiber cereal.  Take a laxative if you don t move your bowels at least every other day. Miralax , Milk of Magnesia, Colace , or Senna  can be used to keep you regular.

## 2024-09-10 NOTE — CONFIDENTIAL NOTE
NEUROSURGERY - NEW PREVISIT PLANNING    Referring Provider: Self / ED follow up   [facility] ED  / 09/09/2024   Reason For Visit: Fall injury that resulted in back pain, seen at the ED but no referral was put in at the time, discharge note states patient can follow up with ortho spine.       IMAGING STATUS/LOCATION DATE/TYPE   MRI N/A    CT PACS 09/09/2024  Lumbar  [place]   XRAY N/A    NOTES STATUS/LOCATION DATE/TYPE   Other specialist [facility]: Encounters / ED 09/09/2024   EMG N/A    INJECTION N/A    PHYSICAL THERAPY N/A    SURGERY N/A

## 2024-09-11 ENCOUNTER — PRE VISIT (OUTPATIENT)
Dept: NEUROSURGERY | Facility: CLINIC | Age: 74
End: 2024-09-11

## 2024-09-11 ENCOUNTER — OFFICE VISIT (OUTPATIENT)
Dept: NEUROSURGERY | Facility: CLINIC | Age: 74
End: 2024-09-11
Payer: MEDICARE

## 2024-09-11 VITALS
OXYGEN SATURATION: 100 % | SYSTOLIC BLOOD PRESSURE: 146 MMHG | HEART RATE: 96 BPM | TEMPERATURE: 98.1 F | DIASTOLIC BLOOD PRESSURE: 64 MMHG

## 2024-09-11 DIAGNOSIS — M54.16 LUMBAR RADICULOPATHY: Primary | ICD-10-CM

## 2024-09-11 RX ORDER — METHYLPREDNISOLONE 4 MG
TABLET, DOSE PACK ORAL
Qty: 21 TABLET | Refills: 0 | Status: SHIPPED | OUTPATIENT
Start: 2024-09-11

## 2024-09-11 ASSESSMENT — PAIN SCALES - GENERAL: PAINLEVEL: EXTREME PAIN (9)

## 2024-09-11 NOTE — NURSING NOTE
"Nereyda Rosa is a 74 year old female who presents for:  Chief Complaint   Patient presents with    Consult     lower back pain, pain goes down right leg.         Initial Vitals:  BP (!) 146/64   Pulse 96   Temp 98.1  F (36.7  C)   LMP  (LMP Unknown)   SpO2 100%  Estimated body mass index is 39.86 kg/m  as calculated from the following:    Height as of 5/22/24: 5' 3\" (1.6 m).    Weight as of 5/22/24: 225 lb (102.1 kg).. There is no height or weight on file to calculate BSA. BP completed using cuff size: large  Extreme Pain (9)    Nursing Comments:       Libertad Calderon    "

## 2024-09-11 NOTE — PROGRESS NOTES
[facility] Care Coordination Contact  CC received notification of Emergency Room visit.  ER visit occurred on 9/10/24 at [facility] with Dx of right leg pain/fall.    CC contacted adult daughter Rosamaria  and reviewed discharge summary.  Member has a follow-up appointment with PCP: Yes: scheduled on 9/11/24 with neurosurgery  Member has had a change in condition: No  New referrals placed: No  Home Visit Needed: No  Support Plan reviewed and updated.  PCP notified of ED visit via EMR.    Yuly Dotson RN,  PHN  [facility] Care Coordinator  [phone]

## 2024-09-11 NOTE — LETTER
9/11/2024      Nereyda Rosa  [address] [facility] [address]      Dear Colleague,    Thank you for referring your patient, Nereyda Rosa, to the [facility] NEUROLOGY CLINICS [facility]. Please see a copy of my visit note below.    [facility] Neurosurgery Clinic Visit      CC: lower back pain     Primary Care Provider: Nelli Conner    Reason For Visit:   I was asked by Dr. Kirkland to see this patient in consultation.     HPI: Patient was seen with the assistance of an . Nereyda Rosa is a 74 year old female who presents for evaluation of lower back pain. Symptoms started s/p mechanical fall on 9/9/24. Today, patient reports lower back pain that radiates to her right buttocks, and distally/circumferentially to her ankle. Pain is worsened with walking and standing from sitting. She states she is able to walk around her house and to a playground in her neighborhood. She endorses numbness circumferentially in her right lower extremity from her thigh to her ankle. Denies any weakness or saddle anesthesia. Of note, patient has a history of bladder incontinence of two years, as well as a history of ambulating with a cane for two years. Patient has tried oxycodone for pain management.     Past Medical History:   Diagnosis Date     Hypertension        Past Medical History reviewed with patient during visit.    Past Surgical History:   Procedure Laterality Date     COLONOSCOPY N/A 5/19/2023    Procedure: COLONOSCOPY;  Surgeon: Fred Evans MD;  Location:  GI     Past Surgical History reviewed with patient during visit.    Current Outpatient Medications   Medication Sig Dispense Refill     famotidine (PEPCID) 40 MG tablet TAKE 1 TABLET(40 MG) BY MOUTH DAILY 90 tablet 0     furosemide (LASIX) 20 MG tablet TAKE 1 TABLET(20 MG) BY MOUTH DAILY 30 tablet 0     megestrol (MEGACE) 40 MG tablet Take 1 tablet (40 mg) by mouth 2 times daily 180 tablet 3     methylPREDNISolone (MEDROL  DOSEPAK) 4 MG tablet therapy pack Follow Package Directions 21 tablet 0     naproxen (NAPROSYN) 500 MG tablet TAKE 1 TABLET(500 MG) BY MOUTH TWICE DAILY WITH MEALS 60 tablet 0     oxyCODONE (ROXICODONE) 5 MG tablet Take 1 tablet (5 mg) by mouth every 6 hours as needed for pain. 12 tablet 0     No current facility-administered medications for this visit.       Allergies   Allergen Reactions     Chicken Allergy      Rash on shoulders     Food      Allergic to salt per pt       Social History     Socioeconomic History     Marital status: Legally      Spouse name: None     Number of children: None     Years of education: None     Highest education level: None   Tobacco Use     Smoking status: Never     Passive exposure: Never     Smokeless tobacco: Never   Vaping Use     Vaping status: Never Used   Substance and Sexual Activity     Alcohol use: Never     Drug use: Never     Sexual activity: Not Currently     Social Determinants of Health     Financial Resource Strain: Low Risk  (6/3/2024)    Financial Resource Strain      Within the past 12 months, have you or your family members you live with been unable to get utilities (heat, electricity) when it was really needed?: No   Food Insecurity: Low Risk  (6/3/2024)    Food Insecurity      Within the past 12 months, did you worry that your food would run out before you got money to buy more?: No      Within the past 12 months, did the food you bought just not last and you didn t have money to get more?: No   Transportation Needs: Low Risk  (6/3/2024)    Transportation Needs      Within the past 12 months, has lack of transportation kept you from medical appointments, getting your medicines, non-medical meetings or appointments, work, or from getting things that you need?: No   Physical Activity: Sufficiently Active (6/3/2024)    Exercise Vital Sign      Days of Exercise per Week: 5 days      Minutes of Exercise per Session: 30 min   Stress: No Stress Concern Present  (6/3/2024)    [place] [employer] - Occupational Stress Questionnaire      Feeling of Stress : Not at all   Social Connections: Moderately Integrated (6/3/2024)    Social Connection and Isolation Panel [NHANES]      Frequency of Communication with Friends and Family: More than three times a week      Frequency of Social Gatherings with Friends and Family: More than three times a week      Attends Holiness Services: More than 4 times per year      Active Member of Clubs or Organizations: Yes      Attends Club or Organization Meetings: More than 4 times per year      Marital Status:    Interpersonal Safety: Low Risk  (6/3/2024)    Interpersonal Safety      Do you feel physically and emotionally safe where you currently live?: Yes      Within the past 12 months, have you been hit, slapped, kicked or otherwise physically hurt by someone?: No      Within the past 12 months, have you been humiliated or emotionally abused in other ways by your partner or ex-partner?: No   Housing Stability: Low Risk  (6/3/2024)    Housing Stability      Do you have housing? : Yes      Are you worried about losing your housing?: No       Family History   Problem Relation Age of Onset     Glaucoma No family hx of      Macular Degeneration No family hx of        ROS: 10 point ROS neg other than the symptoms noted above in the HPI.    Vital Signs: BP (!) 146/64   Pulse 96   Temp 98.1  F (36.7  C)   LMP  (LMP Unknown)   SpO2 100%     Neurological Examination:  Awake  Alert  Oriented x 3  Speech clear    Motor exam:  RLE exam is pain limited. Patient is able to slightly raise right leg from sitting position, otherwise RLE and LLE 5/5.     Sensation deficit RLE.   Negative clonus bilaterally.   Gait: Able to stand from a seated position with moderate effort.  Mild ttp lumbar spine.     Imaging:   CT LUMBAR SPINE W/O CONTRAST 9/9/2024 4:13 PM                                                                    IMPRESSION:  1.  No acute fracture.  2.  At least moderate degenerative spinal canal and foraminal stenosis  at L4-L5.    Assessment:  Nereyda Rosa is a 74F who presents with lower back pain radiating to her RLE s/p mechanical fall. CT shows spinal canal and foraminal stenosis at L4-5. An MRI is needed for further evaluation.     We discussed symptoms, imaging, and next steps.      Plan:   -MRI ordered for further evaluation  -Ordered medrol dose jaun   -Referral to PT  -Advised patient to call clinic if symptoms persist  -Will call patient with MRI results and next steps  -Advised patient to call our clinic with any questions or concerns. Patient voiced understanding and agreement.      Tabitha Rogers CNP  [facility] Neurosurgery  Tel [phone]           Again, thank you for allowing me to participate in the care of your patient.        Sincerely,        LUCAS Mora CNP

## 2024-09-11 NOTE — PROGRESS NOTES
[facility] Neurosurgery Clinic Visit      CC: lower back pain     Primary Care Provider: Nelli Conner    Reason For Visit:   I was asked by Dr. Kirkland to see this patient in consultation.     HPI: Patient was seen with the assistance of an . Nereyda Rosa is a 74 year old female who presents for evaluation of lower back pain. Symptoms started s/p mechanical fall on 9/9/24. Today, patient reports lower back pain that radiates to her right buttocks, and distally/circumferentially to her ankle. Pain is worsened with walking and standing from sitting. She states she is able to walk around her house and to a playground in her neighborhood. She endorses numbness circumferentially in her right lower extremity from her thigh to her ankle. Denies any weakness or saddle anesthesia. Of note, patient has a history of bladder incontinence of two years, as well as a history of ambulating with a cane for two years. Patient has tried oxycodone for pain management.     Past Medical History:   Diagnosis Date    Hypertension        Past Medical History reviewed with patient during visit.    Past Surgical History:   Procedure Laterality Date    COLONOSCOPY N/A 5/19/2023    Procedure: COLONOSCOPY;  Surgeon: Fred Evans MD;  Location:  GI     Past Surgical History reviewed with patient during visit.    Current Outpatient Medications   Medication Sig Dispense Refill    famotidine (PEPCID) 40 MG tablet TAKE 1 TABLET(40 MG) BY MOUTH DAILY 90 tablet 0    furosemide (LASIX) 20 MG tablet TAKE 1 TABLET(20 MG) BY MOUTH DAILY 30 tablet 0    megestrol (MEGACE) 40 MG tablet Take 1 tablet (40 mg) by mouth 2 times daily 180 tablet 3    methylPREDNISolone (MEDROL DOSEPAK) 4 MG tablet therapy pack Follow Package Directions 21 tablet 0    naproxen (NAPROSYN) 500 MG tablet TAKE 1 TABLET(500 MG) BY MOUTH TWICE DAILY WITH MEALS 60 tablet 0    oxyCODONE (ROXICODONE) 5 MG tablet Take 1 tablet (5 mg) by mouth every  6 hours as needed for pain. 12 tablet 0     No current facility-administered medications for this visit.       Allergies   Allergen Reactions    Chicken Allergy      Rash on shoulders    Food      Allergic to salt per pt       Social History     Socioeconomic History    Marital status: Legally      Spouse name: None    Number of children: None    Years of education: None    Highest education level: None   Tobacco Use    Smoking status: Never     Passive exposure: Never    Smokeless tobacco: Never   Vaping Use    Vaping status: Never Used   Substance and Sexual Activity    Alcohol use: Never    Drug use: Never    Sexual activity: Not Currently     Social Determinants of Health     Financial Resource Strain: Low Risk  (6/3/2024)    Financial Resource Strain     Within the past 12 months, have you or your family members you live with been unable to get utilities (heat, electricity) when it was really needed?: No   Food Insecurity: Low Risk  (6/3/2024)    Food Insecurity     Within the past 12 months, did you worry that your food would run out before you got money to buy more?: No     Within the past 12 months, did the food you bought just not last and you didn t have money to get more?: No   Transportation Needs: Low Risk  (6/3/2024)    Transportation Needs     Within the past 12 months, has lack of transportation kept you from medical appointments, getting your medicines, non-medical meetings or appointments, work, or from getting things that you need?: No   Physical Activity: Sufficiently Active (6/3/2024)    Exercise Vital Sign     Days of Exercise per Week: 5 days     Minutes of Exercise per Session: 30 min   Stress: No Stress Concern Present (6/3/2024)    [place] [employer] - Occupational Stress Questionnaire     Feeling of Stress : Not at all   Social Connections: Moderately Integrated (6/3/2024)    Social Connection and Isolation Panel [NHANES]     Frequency of Communication with  Friends and Family: More than three times a week     Frequency of Social Gatherings with Friends and Family: More than three times a week     Attends Gn[employer] Services: More than 4 times per year     Active Member of Clubs or Organizations: Yes     Attends Club or Organization Meetings: More than 4 times per year     Marital Status:    Interpersonal Safety: Low Risk  (6/3/2024)    Interpersonal Safety     Do you feel physically and emotionally safe where you currently live?: Yes     Within the past 12 months, have you been hit, slapped, kicked or otherwise physically hurt by someone?: No     Within the past 12 months, have you been humiliated or emotionally abused in other ways by your partner or ex-partner?: No   Housing Stability: Low Risk  (6/3/2024)    Housing Stability     Do you have housing? : Yes     Are you worried about losing your housing?: No       Family History   Problem Relation Age of Onset    Glaucoma No family hx of     Macular Degeneration No family hx of        ROS: 10 point ROS neg other than the symptoms noted above in the HPI.    Vital Signs: BP (!) 146/64   Pulse 96   Temp 98.1  F (36.7  C)   LMP  (LMP Unknown)   SpO2 100%     Neurological Examination:  Awake  Alert  Oriented x 3  Speech clear    Motor exam:  RLE exam is pain limited. Patient is able to slightly raise right leg from sitting position, otherwise RLE and LLE 5/5.     Sensation deficit RLE.   Negative clonus bilaterally.   Gait: Able to stand from a seated position with moderate effort.  Mild ttp lumbar spine.     Imaging:   CT LUMBAR SPINE W/O CONTRAST 9/9/2024 4:13 PM                                                                   IMPRESSION:  1.  No acute fracture.  2.  At least moderate degenerative spinal canal and foraminal stenosis  at L4-L5.    Assessment:  Nereyda Rosa is a 74F who presents with lower back pain radiating to her RLE s/p mechanical fall. CT shows spinal canal and foraminal stenosis at L4-5.  An MRI is needed for further evaluation.     We discussed symptoms, imaging, and next steps.      Plan:   -MRI ordered for further evaluation  -Ordered medrol dose jaun   -Referral to PT  -Advised patient to call clinic if symptoms persist  -Will call patient with MRI results and next steps  -Advised patient to call our clinic with any questions or concerns. Patient voiced understanding and agreement.      Tabitha Rogers CNP  [facility] Neurosurgery  Tel [phone]

## 2024-09-13 ENCOUNTER — THERAPY VISIT (OUTPATIENT)
Dept: PHYSICAL THERAPY | Facility: CLINIC | Age: 74
End: 2024-09-13
Payer: MEDICARE

## 2024-09-13 DIAGNOSIS — M54.16 LUMBAR RADICULOPATHY: ICD-10-CM

## 2024-09-13 PROCEDURE — 97110 THERAPEUTIC EXERCISES: CPT | Mod: GP | Performed by: PHYSICAL THERAPIST

## 2024-09-13 PROCEDURE — 97161 PT EVAL LOW COMPLEX 20 MIN: CPT | Mod: GP | Performed by: PHYSICAL THERAPIST

## 2024-09-13 NOTE — PROGRESS NOTES
PHYSICAL THERAPY EVALUATION  Type of Visit: Evaluation        Fall Risk Screen:  Fall screen completed by: PT  Have you fallen 2 or more times in the past year?: Yes  Have you fallen and had an injury in the past year?: Yes  Is patient a fall risk?: Yes    Subjective     Patient presents s/p a fall on 9/9/24 where she became lightheaded and lost her balance landing on her right side.  Pain in lower back right hip and right leg. Patient had visit with  neurosurgery 9/11/24, MRI ordered and pending. Currently taking a prednisone pack and pain is somewhat improved.  Pt reporting before the fall she struggled with strength in legs and bending over.   Presenting condition or subjective complaint:    Date of onset: 09/09/24    Relevant medical history: High blood pressure; Migraines or headaches   Dates & types of surgery:      Prior diagnostic imaging/testing results: MRI; CT scan     Prior therapy history for the same diagnosis, illness or injury: No      Prior Level of Function  Transfers: Independent  Ambulation: Independent  ADL: Independent  IADL:  assist    Living Environment  Social support: Alone   Type of home: [facility]   Stairs to enter the home: No       Ramp: No   Stairs inside the home: No       Help at home: Self Cares (home health aide/personal care attendant, family, etc)  Equipment owned: Straight Cane     Employment: No    Hobbies/Interests:      Patient goals for therapy:  decrease pain    Pain assessment: seated at rest in clinic- 5/10- in low back and down the leg.    Currently is using- ice and heat, and medication after prednisone pack has been helping a lot.  Is sleeping about 50/50 the pain is waking her up. When she does exercises it gets worse.      Objective   Posture: forward head rounded shoulders , posterior tilt in seated-  with upright posture and support in lumbar spine symptoms decreased.  Laying flat decreases lumbar pain    Gait: slow, wide ROSA, unsteady    Screening: no red flag  noted      Lumbar Movement Loss Response   Flexion Max loss- pain   Extension Min loss- pain   Side glide L Min loss- pain   Side glide R Min loss-pain     Hip PROM/Strength: unable to fully assess due to pain    Neurological:    Myotomes L R   L1-2 (hip flexion) 4/5 3-/5   L3 (knee extension) 4/5 3/5   L4 (ankle DF) 4/5 3/5     Dural Signs L R   Slump - +   SLR - +     Palpation: pt tender to palpation right sided lumbar spine    Functional Squat and Balance: sit to stand- heavy use of UE      Assessment & Plan   CLINICAL IMPRESSIONS  Medical Diagnosis: lumbar radiculopathy    Treatment Diagnosis: acute lumbar radiculopathy   Impression/Assessment: Patient is a 74 year old female with lumbar radiculopathy, right complaints.  The following significant findings have been identified: Pain, Decreased ROM/flexibility, Decreased joint mobility, Decreased strength, Impaired balance, Impaired gait, Impaired muscle performance, Decreased activity tolerance, and Impaired posture. These impairments interfere with their ability to perform self care tasks, recreational activities, household chores, driving , household mobility, and community mobility as compared to previous level of function.     Clinical Decision Making (Complexity):  Clinical Presentation: Stable/Uncomplicated  Clinical Presentation Rationale: based on medical and personal factors listed in PT evaluation  Clinical Decision Making (Complexity): Low complexity    PLAN OF CARE  Treatment Interventions:  Modalities: E-stim, Ultrasound  Interventions: Gait Training, Manual Therapy, Neuromuscular Re-education, Therapeutic Activity, Therapeutic Exercise, Self-Care/Home Management    Long Term Goals     PT Goal 1  Goal Identifier: walking  Goal Description: Patient will report minimal to no pain with walking for at least 20 minutes  Rationale: to maximize safety and independence with performance of ADLs and functional tasks;to maximize safety and independence within  the home;to maximize safety and independence within the community;to maximize safety and independence with transportation;to maximize safety and independence with self cares  Target Date: 12/12/24      Frequency of Treatment: 1x/week 8 weeks, 2/month  Duration of Treatment: 12/12/24    Recommended Referrals to Other Professionals:   Education Assessment:   Learner/Method: No Barriers to Learning  Education Comments: no concerns    Risks and benefits of evaluation/treatment have been explained.   Patient/Family/caregiver agrees with Plan of Care.     Evaluation Time:     PT Eval, Low Complexity Minutes (75999): 25       Signing Clinician: Sejal Nuñez PT        [facility]                                                                                   OUTPATIENT PHYSICAL THERAPY      PLAN OF TREATMENT FOR OUTPATIENT REHABILITATION   Patient's Last Name, First Name, Nereyda Capellan YOB: 1950   Provider's Name   [facility]   Medical Record No.  [id number]     Onset Date: 09/09/24  Start of Care Date: 09/13/24     Medical Diagnosis:  lumbar radiculopathy      PT Treatment Diagnosis:  acute lumbar radiculopathy Plan of Treatment  Frequency/Duration: 1x/week 8 weeks, 2/month/ 12/12/24    Certification date from 09/13/24 to 12/12/24         See note for plan of treatment details and functional goals     Sejal Nuñez, PT                         I CERTIFY THE NEED FOR THESE SERVICES FURNISHED UNDER        THIS PLAN OF TREATMENT AND WHILE UNDER MY CARE     (Physician attestation of this document indicates review and certification of the therapy plan).              Referring Provider:  Tabitha Rogers    Initial Assessment  See Epic Evaluation- Start of Care Date: 09/13/24

## 2024-09-28 ENCOUNTER — HOSPITAL ENCOUNTER (OUTPATIENT)
Dept: MRI IMAGING | Facility: CLINIC | Age: 74
Discharge: HOME | End: 2024-09-28
Payer: MEDICARE

## 2024-09-28 DIAGNOSIS — M54.16 LUMBAR RADICULOPATHY: ICD-10-CM

## 2024-09-28 PROCEDURE — G1010 CDSM STANSON: HCPCS

## 2024-10-09 ENCOUNTER — OFFICE VISIT (OUTPATIENT)
Dept: NEUROSURGERY | Facility: CLINIC | Age: 74
End: 2024-10-09
Attending: STUDENT IN AN ORGANIZED HEALTH CARE EDUCATION/TRAINING PROGRAM
Payer: MEDICARE

## 2024-10-09 ENCOUNTER — VIRTUAL VISIT (OUTPATIENT)
Dept: INTERPRETER SERVICES | Facility: CLINIC | Age: 74
End: 2024-10-09
Payer: MEDICARE

## 2024-10-09 VITALS
HEART RATE: 84 BPM | WEIGHT: 225 LBS | DIASTOLIC BLOOD PRESSURE: 78 MMHG | OXYGEN SATURATION: 98 % | SYSTOLIC BLOOD PRESSURE: 119 MMHG | BODY MASS INDEX: 39.86 KG/M2

## 2024-10-09 DIAGNOSIS — M54.16 LUMBAR RADICULOPATHY: Primary | ICD-10-CM

## 2024-10-09 PROCEDURE — T1013 SIGN LANG/ORAL INTERPRETER: HCPCS | Mod: GT,TEL,95

## 2024-10-09 PROCEDURE — 99213 OFFICE O/P EST LOW 20 MIN: CPT

## 2024-10-09 PROCEDURE — G0463 HOSPITAL OUTPT CLINIC VISIT: HCPCS

## 2024-10-09 NOTE — PROGRESS NOTES
[facility] Neurosurgery Clinic Visit      CC: back pain and right leg pain     Primary Care Provider: Nelli Conner    HPI: Patient was seen with the assistance of an . Nereyda Rosa is a 74 year old female who presents for follow up of back and right leg pain and review of MRI results. Today, patient reports decreased back pain but still continues to have right leg pain. Patient has been attending PT appointment and completed her Medrol dose jaun with some relief.     Past Medical History:   Diagnosis Date    Hypertension        Past Medical History reviewed with patient during visit.    Past Surgical History:   Procedure Laterality Date    COLONOSCOPY N/A 5/19/2023    Procedure: COLONOSCOPY;  Surgeon: Fred Evans MD;  Location:  GI     Past Surgical History reviewed with patient during visit.    Current Outpatient Medications   Medication Sig Dispense Refill    famotidine (PEPCID) 40 MG tablet TAKE 1 TABLET(40 MG) BY MOUTH DAILY 90 tablet 0    furosemide (LASIX) 20 MG tablet TAKE 1 TABLET(20 MG) BY MOUTH DAILY 30 tablet 0    megestrol (MEGACE) 40 MG tablet Take 1 tablet (40 mg) by mouth 2 times daily 180 tablet 3    methylPREDNISolone (MEDROL DOSEPAK) 4 MG tablet therapy pack Follow Package Directions 21 tablet 0    naproxen (NAPROSYN) 500 MG tablet TAKE 1 TABLET(500 MG) BY MOUTH TWICE DAILY WITH MEALS 60 tablet 0     No current facility-administered medications for this visit.       Allergies   Allergen Reactions    Chicken Allergy      Rash on shoulders    Food      Allergic to salt per pt       Social History     Socioeconomic History    Marital status: Legally    Tobacco Use    Smoking status: Never     Passive exposure: Never    Smokeless tobacco: Never   Vaping Use    Vaping status: Never Used   Substance and Sexual Activity    Alcohol use: Never    Drug use: Never    Sexual activity: Not Currently     Social Determinants of Health     Financial Resource  Strain: Low Risk  (6/3/2024)    Financial Resource Strain     Within the past 12 months, have you or your family members you live with been unable to get utilities (heat, electricity) when it was really needed?: No   Food Insecurity: Low Risk  (6/3/2024)    Food Insecurity     Within the past 12 months, did you worry that your food would run out before you got money to buy more?: No     Within the past 12 months, did the food you bought just not last and you didn t have money to get more?: No   Transportation Needs: Low Risk  (6/3/2024)    Transportation Needs     Within the past 12 months, has lack of transportation kept you from medical appointments, getting your medicines, non-medical meetings or appointments, work, or from getting things that you need?: No   Physical Activity: Sufficiently Active (6/3/2024)    Exercise Vital Sign     Days of Exercise per Week: 5 days     Minutes of Exercise per Session: 30 min   Stress: No Stress Concern Present (6/3/2024)    [employer] [place] [employer] - Occupational Stress Questionnaire     Feeling of Stress : Not at all   Social Connections: Moderately Integrated (6/3/2024)    Social Connection and Isolation Panel [NHANES]     Frequency of Communication with Friends and Family: More than three times a week     Frequency of Social Gatherings with Friends and Family: More than three times a week     Attends Ep[employer] Services: More than 4 times per year     Active Member of Clubs or Organizations: Yes     Attends Club or Organization Meetings: More than 4 times per year     Marital Status:    Interpersonal Safety: Low Risk  (6/3/2024)    Interpersonal Safety     Do you feel physically and emotionally safe where you currently live?: Yes     Within the past 12 months, have you been hit, slapped, kicked or otherwise physically hurt by someone?: No     Within the past 12 months, have you been humiliated or emotionally abused in other ways by your partner or  ex-partner?: No   Housing Stability: Low Risk  (6/3/2024)    Housing Stability     Do you have housing? : Yes     Are you worried about losing your housing?: No       Family History   Problem Relation Age of Onset    Glaucoma No family hx of     Macular Degeneration No family hx of        ROS: 10 point ROS neg other than the symptoms noted above in the HPI.    Vital Signs: /78   Pulse 84   Wt 102.1 kg (225 lb)   LMP  (LMP Unknown)   SpO2 98%   BMI 39.86 kg/m        Imaging:   EXAM: MR LUMBAR SPINE W/O CONTRAST  LOCATION: [facility]  DATE: 9/28/2024     FINDINGS:   Vertebral body heights are maintained. Multilevel loss of disc height most notably at L4-5. Conus terminates normally. Marrow signal is heterogeneous but otherwise unremarkable. No significant STIR edema within the vertebral column.     T12-L1: No canal stenosis or foraminal narrowing. Mild facet disease.     L1-2: No canal stenosis. Mild facet disease. No foraminal narrowing.     L2-3: No canal stenosis. Facet disease. Mild bilateral foraminal narrowing.     L3-4: Broad-based disc bulge with ligamentum flavum hypertrophy and facet arthrosis with moderate canal stenosis and moderate to severe bilateral foraminal narrowing.     L4-5: Broad-based disc bulge with severe canal stenosis. Ligamentum flavum hypertrophy and facet arthrosis. Severe bilateral foraminal narrowing.     L5-S1: Broad-based disc bulge. Mild to moderate canal stenosis. Facet disease. Mild-moderate bilateral foraminal narrowing.     Limited assessment of the intra-abdominal structures are unremarkable.                                                                       IMPRESSION:  1.  Extensive degenerative changes in the lumbar spine as above with severe canal stenotic findings at L4-5.    Assessment:  74F who initially presented with R low back pain and R leg pain. Has found some relief with PT and Medrol dose jaun. MRI shows severe bilateral foraminal  narrowing at L4-5. She is symptomatic only in her right leg and denies any radicular symptoms in her left leg, R L4-5 MILO ordered.     We discussed symptoms, imaging, and next steps.      Plan:   -Order for R L4-5 MILO. They will call you to schedule.   -Advised patient to call clinic if symptoms persist 2-3 weeks after MILO.   -Advised patient to call our clinic with any questions or concerns. Patient voiced understanding and agreement.      Tabitha Rogers, CNP  [facility]  [address] [place] [id number]  Tel [phone]  Fax [phone]

## 2024-10-09 NOTE — NURSING NOTE
"Nereyda Rosa is a 74 year old female who presents for:  Chief Complaint   Patient presents with    Results     MRI review        Vitals:    Vitals:    10/09/24 1028   BP: 119/78   Pulse: 84   SpO2: 98%   Weight: 225 lb (102.1 kg)       BMI:  Estimated body mass index is 39.86 kg/m  as calculated from the following:    Height as of 5/22/24: 5' 3\" (1.6 m).    Weight as of this encounter: 225 lb (102.1 kg).          Amendo Phorn      "

## 2024-10-09 NOTE — LETTER
10/9/2024      Nereyda Rosa  [address] [facility] [address]      Dear Colleague,    Thank you for referring your patient, Nereyda Rosa, to the [facility]OS[facility] [place]. Please see a copy of my visit note below.    [facility] Neurosurgery Clinic Visit      CC: back pain and right leg pain     Primary Care Provider: Nelli Conner    HPI: Patient was seen with the assistance of an . Nereyda Rosa is a 74 year old female who presents for follow up of back and right leg pain and review of MRI results. Today, patient reports decreased back pain but still continues to have right leg pain. Patient has been attending PT appointment and completed her Medrol dose jaun with some relief.     Past Medical History:   Diagnosis Date     Hypertension        Past Medical History reviewed with patient during visit.    Past Surgical History:   Procedure Laterality Date     COLONOSCOPY N/A 5/19/2023    Procedure: COLONOSCOPY;  Surgeon: Fred Evans MD;  Location: [facility]     Past Surgical History reviewed with patient during visit.    Current Outpatient Medications   Medication Sig Dispense Refill     famotidine (PEPCID) 40 MG tablet TAKE 1 TABLET(40 MG) BY MOUTH DAILY 90 tablet 0     furosemide (LASIX) 20 MG tablet TAKE 1 TABLET(20 MG) BY MOUTH DAILY 30 tablet 0     megestrol (MEGACE) 40 MG tablet Take 1 tablet (40 mg) by mouth 2 times daily 180 tablet 3     methylPREDNISolone (MEDROL DOSEPAK) 4 MG tablet therapy pack Follow Package Directions 21 tablet 0     naproxen (NAPROSYN) 500 MG tablet TAKE 1 TABLET(500 MG) BY MOUTH TWICE DAILY WITH MEALS 60 tablet 0     No current facility-administered medications for this visit.       Allergies   Allergen Reactions     Chicken Allergy      Rash on shoulders     Food      Allergic to salt per pt       Social History     Socioeconomic History     Marital status: Legally    Tobacco Use     Smoking status:  Never     Passive exposure: Never     Smokeless tobacco: Never   Vaping Use     Vaping status: Never Used   Substance and Sexual Activity     Alcohol use: Never     Drug use: Never     Sexual activity: Not Currently     Social Determinants of Health     Financial Resource Strain: Low Risk  (6/3/2024)    Financial Resource Strain      Within the past 12 months, have you or your family members you live with been unable to get utilities (heat, electricity) when it was really needed?: No   Food Insecurity: Low Risk  (6/3/2024)    Food Insecurity      Within the past 12 months, did you worry that your food would run out before you got money to buy more?: No      Within the past 12 months, did the food you bought just not last and you didn t have money to get more?: No   Transportation Needs: Low Risk  (6/3/2024)    Transportation Needs      Within the past 12 months, has lack of transportation kept you from medical appointments, getting your medicines, non-medical meetings or appointments, work, or from getting things that you need?: No   Physical Activity: Sufficiently Active (6/3/2024)    Exercise Vital Sign      Days of Exercise per Week: 5 days      Minutes of Exercise per Session: 30 min   Stress: No Stress Concern Present (6/3/2024)    [employer] [place] [employer]ational [employer] - Occupational Stress Questionnaire      Feeling of Stress : Not at all   Social Connections: Moderately Integrated (6/3/2024)    Social Connection and Isolation Panel [NHANES]      Frequency of Communication with Friends and Family: More than three times a week      Frequency of Social Gatherings with Friends and Family: More than three times a week      Attends Bahai Services: More than 4 times per year      Active Member of Clubs or Organizations: Yes      Attends Club or Organization Meetings: More than 4 times per year      Marital Status:    Interpersonal Safety: Low Risk  (6/3/2024)    Interpersonal Safety      Do you feel  physically and emotionally safe where you currently live?: Yes      Within the past 12 months, have you been hit, slapped, kicked or otherwise physically hurt by someone?: No      Within the past 12 months, have you been humiliated or emotionally abused in other ways by your partner or ex-partner?: No   Housing Stability: Low Risk  (6/3/2024)    Housing Stability      Do you have housing? : Yes      Are you worried about losing your housing?: No       Family History   Problem Relation Age of Onset     Glaucoma No family hx of      Macular Degeneration No family hx of        ROS: 10 point ROS neg other than the symptoms noted above in the HPI.    Vital Signs: /78   Pulse 84   Wt 102.1 kg (225 lb)   LMP  (LMP Unknown)   SpO2 98%   BMI 39.86 kg/m        Imaging:   EXAM: MR LUMBAR SPINE W/O CONTRAST  LOCATION: [facility]  DATE: 9/28/2024     FINDINGS:   Vertebral body heights are maintained. Multilevel loss of disc height most notably at L4-5. Conus terminates normally. Marrow signal is heterogeneous but otherwise unremarkable. No significant STIR edema within the vertebral column.     T12-L1: No canal stenosis or foraminal narrowing. Mild facet disease.     L1-2: No canal stenosis. Mild facet disease. No foraminal narrowing.     L2-3: No canal stenosis. Facet disease. Mild bilateral foraminal narrowing.     L3-4: Broad-based disc bulge with ligamentum flavum hypertrophy and facet arthrosis with moderate canal stenosis and moderate to severe bilateral foraminal narrowing.     L4-5: Broad-based disc bulge with severe canal stenosis. Ligamentum flavum hypertrophy and facet arthrosis. Severe bilateral foraminal narrowing.     L5-S1: Broad-based disc bulge. Mild to moderate canal stenosis. Facet disease. Mild-moderate bilateral foraminal narrowing.     Limited assessment of the intra-abdominal structures are unremarkable.                                                                        IMPRESSION:  1.  Extensive degenerative changes in the lumbar spine as above with severe canal stenotic findings at L4-5.    Assessment:  74F who initially presented with R low back pain and R leg pain. Has found some relief with PT and Medrol dose jaun. MRI shows severe bilateral foraminal narrowing at L4-5. She is symptomatic only in her right leg and denies any radicular symptoms in her left leg, R L4-5 MILO ordered.     We discussed symptoms, imaging, and next steps.      Plan:   -Order for R L4-5 MILO. They will call you to schedule.   -Advised patient to call clinic if symptoms persist 2-3 weeks after MILO.   -Advised patient to call our clinic with any questions or concerns. Patient voiced understanding and agreement.      Tabitha Rogers CNP  [facility] Neurosurgery  [address] [place] [id number]  Tel [phone]  Fax [phone]            Again, thank you for allowing me to participate in the care of your patient.        Sincerely,        LUCAS Mora CNP

## 2024-10-20 ENCOUNTER — PATIENT OUTREACH (OUTPATIENT)
Dept: GERIATRIC MEDICINE | Facility: CLINIC | Age: 74
End: 2024-10-20
Payer: MEDICARE

## 2024-10-20 NOTE — PROGRESS NOTES
[facility] Care Coordination Contact      [facility] Six-Month Telephone Assessment    6 month telephone assessment completed on 10/18/24.    ER visits: Yes -  [facility]  Hospitalizations: No  TCU stays: No  Significant health status changes: No   Falls/Injuries: Yes: Fall 9/9/24 in the bathroom.  Right leg pain and was seen in ED.  ADL/IADL changes: No  Changes in services: Yes:  [facility] adult day care and Rosamaria, daughter called with the request to change adult day care and the transportation.  Updated the POC and the budget. Confirmed with [facility] the request for transfers and the 2 day cares of the transition date.  October 2 [facility] at [facility]     Caregiver Assessment follow up: Auth changed for new     Goals: See Support Plan for goal progress documentation.      Will see member in 6 months for an annual health risk assessment.   Encouraged member to call CC with any questions or concerns in the meantime.     Yuly Dotson RN,  PHN  [facility] Care Coordinator  [phone]

## 2024-10-22 NOTE — PROGRESS NOTES
Provider Signature - No Support Plan Shared:  Member indicates that they do not want their support plan shared with any [facility] providers. and Member Signature - Support Plan Change:  Per CC, member has made a change to their support plan.  Care Plan Change Letter mailed to member for signature with a self-addressed return envelope. ADC agency change    Lexii Puckett  Case Management Specialist  [facility]  [phone]

## 2024-11-08 ENCOUNTER — ANCILLARY PROCEDURE (OUTPATIENT)
Dept: GENERAL RADIOLOGY | Facility: CLINIC | Age: 74
End: 2024-11-08
Attending: INTERNAL MEDICINE
Payer: MEDICARE

## 2024-11-08 ENCOUNTER — OFFICE VISIT (OUTPATIENT)
Dept: INTERNAL MEDICINE | Facility: CLINIC | Age: 74
End: 2024-11-08
Payer: MEDICARE

## 2024-11-08 VITALS
HEIGHT: 63 IN | OXYGEN SATURATION: 97 % | HEART RATE: 69 BPM | SYSTOLIC BLOOD PRESSURE: 116 MMHG | WEIGHT: 225 LBS | DIASTOLIC BLOOD PRESSURE: 78 MMHG | TEMPERATURE: 97.5 F | BODY MASS INDEX: 39.87 KG/M2 | RESPIRATION RATE: 18 BRPM

## 2024-11-08 DIAGNOSIS — M25.561 CHRONIC PAIN OF RIGHT KNEE: ICD-10-CM

## 2024-11-08 DIAGNOSIS — G89.29 CHRONIC BILATERAL LOW BACK PAIN WITH RIGHT-SIDED SCIATICA: ICD-10-CM

## 2024-11-08 DIAGNOSIS — M54.41 CHRONIC BILATERAL LOW BACK PAIN WITH RIGHT-SIDED SCIATICA: ICD-10-CM

## 2024-11-08 DIAGNOSIS — M48.062 SPINAL STENOSIS OF LUMBAR REGION WITH NEUROGENIC CLAUDICATION: Primary | ICD-10-CM

## 2024-11-08 DIAGNOSIS — G89.29 CHRONIC PAIN OF RIGHT KNEE: ICD-10-CM

## 2024-11-08 DIAGNOSIS — N95.0 POSTMENOPAUSAL BLEEDING: ICD-10-CM

## 2024-11-08 PROCEDURE — G2211 COMPLEX E/M VISIT ADD ON: HCPCS | Performed by: INTERNAL MEDICINE

## 2024-11-08 PROCEDURE — 73562 X-RAY EXAM OF KNEE 3: CPT | Mod: TC | Performed by: RADIOLOGY

## 2024-11-08 PROCEDURE — 99214 OFFICE O/P EST MOD 30 MIN: CPT | Performed by: INTERNAL MEDICINE

## 2024-11-08 RX ORDER — GABAPENTIN 100 MG/1
CAPSULE ORAL
Qty: 90 CAPSULE | Refills: 1 | Status: SHIPPED | OUTPATIENT
Start: 2024-11-08

## 2024-11-08 NOTE — PROGRESS NOTES
"Dr Gordillo's note      Patient's instructions / PLAN:                                                        Plan:  Stop at OBGYN dept and scheduled the follow up appointment with dr Pearson   2. I resent the referral for the back doctor. You should receive a phone call to schedule the appointment   3. For the leg pain, start taking Gabapentin at bedtime  -- 100 mg daily for 3 days, then  -- 200 mg daily for 3 days, then  -- continue with 300 mg daily at bed time   4. Knee --  XRay today - [address]  5. Referral for the knee doctor        ASSESSMENT & PLAN:                                                      (M48.062) Spinal stenosis of lumbar region with neurogenic claudication  (primary encounter diagnosis)  Comment: We discussed about the new meds, advantages and potential side effects. The patient will read also the info from the pharmacy and call back if questions.   Plan: Spine  Referral, gabapentin         (NEURONTIN) 100 MG capsule            (M54.41,  G89.29) Chronic bilateral low back pain with right-sided sciatica  Comment:   Plan: Spine  Referral, gabapentin         (NEURONTIN) 100 MG capsule            (M25.561,  G89.29) Chronic pain of right knee  Comment:   Plan: Orthopedic  Referral, XR Knee Right 3         Views            (N95.0) Postmenopausal bleeding  Comment:   Plan: as above           Chief complaint:                                                      LBP   R knee pain  OBGYN question    SUBJECTIVE:                                                    History of present illness:    LBP  -- the lower back pain is little better, but the right leg pain is worse and keep her up at night  -- the daughter received a call about scheduling MILO, but she hasn't scheduled it  -- she is not sure about the MILO, but she wants to see the \" bone doctor\". I renewed the referral     Lumbar spine MRI:  IMPRESSION:  1.  Extensive degenerative changes in the lumbar spine as above with " "severe canal stenotic findings at L4-5.    Neurosurgeon appointment Oct 2024  Assessment:  74F who initially presented with R low back pain and R leg pain. Has found some relief with PT and Medrol dose jaun. MRI shows severe bilateral foraminal narrowing at L4-5. She is symptomatic only in her right leg and denies any radicular symptoms in her left leg, R L4-5 MILO ordered.    We discussed symptoms, imaging, and next steps.     Plan:   -Order for R L4-5 MILO. They will call you to schedule.   -Advised patient to call clinic if symptoms persist 2-3 weeks after MILO.   -Advised patient to call our clinic with any questions or concerns. Patient voiced understanding and agreement.      She would like a follow up for the uterus  -- she has seen dr Michel Franco   Nereyda is a 74 year old, presenting for the following health issues:  Knee Pain and Tailbone Pain (Pt takes more medications but does not know the names of them)      11/8/2024     7:15 AM   Additional Questions   Roomed by Meggan BRAGA   Accompanied by daughter     History of Present Illness       Back Pain:  She presents for follow up of back pain. Patient's back pain is a recurring problem.  Location of back pain:  Right lower back  Description of back pain: burning, cramping and sharp  Back pain spreads: right buttocks, right knee and right foot    Since patient first noticed back pain, pain is: always present, but gets better and worse  Does back pain interfere with her job:  Not applicable       Headaches:   Since the patient's last clinic visit, headaches are: worsened  The patient is getting headaches:  Everynight and sometimes during day  She is not able to do normal daily activities when she has a migraine.  The patient is taking the following rescue/relief medications:  Tylenol   Patient states \"I get only a small amount of relief\" from the rescue/relief medications.   The patient is taking the following medications to prevent migraines:  " "Other  In the past 4 weeks, the patient has gone to an Urgent Care or Emergency Room 0 times times due to headaches.    Reason for visit:  Feeling  some pain and check up  Symptom onset:  3-4 weeks ago  Symptoms include:  Knee pain and swolling  Symptom intensity:  Moderate  Symptom progression:  Staying the same  Had these symptoms before:  No  What makes it worse:  No  What makes it better:  Medication   walking She is missing 2 dose(s) of medications per week.       Review of Systems:                                                      ROS: negative for fever, chills, cough, wheezes, chest pain, shortness of breath, vomiting, abdominal pain, leg swelling       OBJECTIVE:             Physical exam:  Blood pressure 116/78, pulse 69, temperature 97.5  F (36.4  C), temperature source Tympanic, resp. rate 18, height 1.6 m (5' 3\"), weight 102.1 kg (225 lb), SpO2 97%, not currently breastfeeding.     NAD, appears comfortable  Skin: no rashes   Neck: supple, no JVD,  No thyroidmegaly. Lymph nodes nonpalpable cervical and supraclavicular.  Chest: clear to auscultation bilaterally, good respiratory effort  Heart: S1 S2, RRR, no mgr appreciated  Abdomen: soft, not tender,   Extremities: no edema,   Neurologic: A, Ox3, no focal signs appreciated  R knee: no effusion but pain w active and passive movements    PMHx: reviewed  Past Medical History:   Diagnosis Date    Hypertension       PSHx: reviewed  Past Surgical History:   Procedure Laterality Date    COLONOSCOPY N/A 5/19/2023    Procedure: COLONOSCOPY;  Surgeon: Fred Evans MD;  Location:  GI        Meds: reviewed  Current Outpatient Medications   Medication Sig Dispense Refill    famotidine (PEPCID) 40 MG tablet TAKE 1 TABLET(40 MG) BY MOUTH DAILY 90 tablet 0    furosemide (LASIX) 20 MG tablet TAKE 1 TABLET(20 MG) BY MOUTH DAILY 30 tablet 0    megestrol (MEGACE) 40 MG tablet Take 1 tablet (40 mg) by mouth 2 times daily 180 tablet 3    methylPREDNISolone (MEDROL " DOSEPAK) 4 MG tablet therapy pack Follow Package Directions 21 tablet 0    naproxen (NAPROSYN) 500 MG tablet TAKE 1 TABLET(500 MG) BY MOUTH TWICE DAILY WITH MEALS 60 tablet 0       Soc Hx: reviewed  Fam Hx: reviewed      Chart documentation was completed, in part, with [employer] voice-recognition software. Even though reviewed, some grammatical, spelling, and word errors may remain.      Nelli Gordillo MD  Internal Medicine       Signed Electronically by: Nelli Conner MD

## 2024-11-08 NOTE — PATIENT INSTRUCTIONS
Plan:  Stop at OBGYN dept and scheduled the follow up appointment with dr Pearson   2. I resent the referral for the back doctor. You should receive a phone call to schedule the appointment   3. For the leg pain, start taking Gabapentin at bedtime  -- 100 mg daily for 3 days, then  -- 200 mg daily for 3 days, then  -- continue with 300 mg daily at bed time   4. Knee --  XRay today - [address]  5. Referral for the knee doctor

## 2024-11-08 NOTE — NURSING NOTE
"Chief Complaint   Patient presents with    Knee Pain    Tailbone Pain     Pt takes more medications but does not know the names of them     initial /78   Pulse 69   Temp 97.5  F (36.4  C) (Tympanic)   Resp 18   Ht 1.6 m (5' 3\")   Wt 102.1 kg (225 lb)   LMP  (LMP Unknown)   SpO2 97%   BMI 39.86 kg/m   Estimated body mass index is 39.86 kg/m  as calculated from the following:    Height as of this encounter: 1.6 m (5' 3\").    Weight as of this encounter: 102.1 kg (225 lb)..  bp completed using cuff size large  ROGELIO SINCLAIR LPN  "

## 2024-11-11 ENCOUNTER — PATIENT OUTREACH (OUTPATIENT)
Dept: CARE COORDINATION | Facility: CLINIC | Age: 74
End: 2024-11-11
Payer: MEDICARE

## 2024-11-13 ENCOUNTER — PATIENT OUTREACH (OUTPATIENT)
Dept: CARE COORDINATION | Facility: CLINIC | Age: 74
End: 2024-11-13
Payer: MEDICARE

## 2024-11-13 NOTE — PROGRESS NOTES
ASSESSMENT & PLAN  Patient Instructions     1. Primary osteoarthritis of right knee    2. Chronic pain of right knee    3. Chronic pain in right foot      -Patient has chronic right knee pain and due to tricompartment osteoarthritis  -X-rays of the right knee shows moderate degenerative changes with joint space narrowing and osteophytes  -Patient tolerated right knee intra-articular cortisone injection today without complications.  Patient was given postprocedure instruction  -Patient will start formal physical therapy and home exercise program to increase strength, stability and balance  -Patient also has chronic right foot pain and deformity due to previous injuries when she was younger  -Patient will be referred to podiatry for further workup and treatment  -Call direct clinic number [[phone]] at any time with questions or concerns.    Albert Yeo MD [facility] and Sports Medicine  [facility]        -----    SUBJECTIVE  Nereyda Rosa is a/an 74 year old female who is seen in consultation at the request of  Nelli Gordillo-*  PCP for evaluation of right knee pain. The patient is seen with their daughter.    Onset: 4 month(s) ago. Patient describes injury as sitting down and reached for cell phone and put weight on knee  Location of Pain: right knee  Rating of Pain at worst: 9/10  Rating of Pain Currently: 8/10  Worsened by: stretch leg  Better with: movement  Treatments tried: rest/activity avoidance, elevation, ice, Tylenol, Ibuprofen, Aleve, and home exercises  Associated symptoms: swelling, numbness, weakness of knee, and locking or catching  Orthopedic history: NO  Relevant surgical history: NO  Social history: social history: retired    Past Medical History:   Diagnosis Date    Hypertension      Social History     Socioeconomic History    Marital status: Legally    Tobacco Use    Smoking status: Never     Passive exposure: Never    Smokeless tobacco:  Never   Vaping Use    Vaping status: Never Used   Substance and Sexual Activity    Alcohol use: Never    Drug use: Never    Sexual activity: Not Currently     Social Drivers of Health     Financial Resource Strain: Low Risk  (6/3/2024)    Financial Resource Strain     Within the past 12 months, have you or your family members you live with been unable to get utilities (heat, electricity) when it was really needed?: No   Food Insecurity: Low Risk  (6/3/2024)    Food Insecurity     Within the past 12 months, did you worry that your food would run out before you got money to buy more?: No     Within the past 12 months, did the food you bought just not last and you didn t have money to get more?: No   Transportation Needs: Low Risk  (6/3/2024)    Transportation Needs     Within the past 12 months, has lack of transportation kept you from medical appointments, getting your medicines, non-medical meetings or appointments, work, or from getting things that you need?: No   Physical Activity: Sufficiently Active (6/3/2024)    Exercise Vital Sign     Days of Exercise per Week: 5 days     Minutes of Exercise per Session: 30 min   Stress: No Stress Concern Present (6/3/2024)    [place] [employer] - Occupational Stress Questionnaire     Feeling of Stress : Not at all   Social Connections: Moderately Integrated (6/3/2024)    Social Connection and Isolation Panel [NHANES]     Frequency of Communication with Friends and Family: More than three times a week     Frequency of Social Gatherings with Friends and Family: More than three times a week     Attends Jewish Services: More than 4 times per year     Active Member of Clubs or Organizations: Yes     Attends Club or Organization Meetings: More than 4 times per year     Marital Status:    Interpersonal Safety: Low Risk  (6/3/2024)    Interpersonal Safety     Do you feel physically and emotionally safe where you currently live?: Yes     Within the  "past 12 months, have you been hit, slapped, kicked or otherwise physically hurt by someone?: No     Within the past 12 months, have you been humiliated or emotionally abused in other ways by your partner or ex-partner?: No   Housing Stability: Low Risk  (6/3/2024)    Housing Stability     Do you have housing? : Yes     Are you worried about losing your housing?: No         Patient's past medical, surgical, social, and family histories were reviewed today and no changes are noted.    REVIEW OF SYSTEMS:  10 point ROS is negative other than symptoms noted above in HPI, Past Medical History or as stated below  Constitutional: NEGATIVE for fever, chills, change in weight  Skin: NEGATIVE for worrisome rashes, moles or lesions  GI/: NEGATIVE for bowel or bladder changes  Neuro: NEGATIVE for weakness, dizziness or paresthesias    OBJECTIVE:  /65   Pulse 68   Ht 1.575 m (5' 2\")   Wt 102.2 kg (225 lb 3.2 oz)   LMP  (LMP Unknown)   BMI 41.19 kg/m     General: healthy, alert and in no distress  HEENT: no scleral icterus or conjunctival erythema  Skin: no suspicious lesions or rash. No jaundice.  CV: no pedal edema  Resp: normal respiratory effort without conversational dyspnea   Psych: normal mood and affect  Gait: normal steady gait with appropriate coordination and balance  Neuro: Normal light sensory exam of lower extremity  MSK:  RIGHT KNEE  Inspection:    normal alignment  Palpation:    Tender about the lateral joint line and medial joint line. Remainder of bony and ligamentous landmarks are nontender.    Trace effusion is present    Patellofemoral crepitus is Absent  Range of Motion:     00 extension to 1200 flexion  Strength:    Quadriceps grossly intact    Extensor mechanism intact  Special Tests:    Positive: none    Negative: MCL/valgus stress (0 & 30 deg), LCL/varus stress (0 & 30 deg), Lachman's, anterior drawer, posterior drawer, Moni's    Independent visualization of the below image:  No results " found for this or any previous visit (from the past 24 hours).      Personal review of right knee x-ray performed on 11/8/2024 shows moderate tricompartment arthritis.  Contour irregularity of the lateral tibial plateau with some subchondral cysts.  No acute fracture or dislocation.    Large Joint Injection/Arthocentesis: R knee joint    Date/Time: 11/15/2024 11:29 AM    Performed by: Yeo, Albert, MD  Authorized by: Yeo, Albert, MD    Indications:  Pain and osteoarthritis  Needle Size:  22 G  Guidance: ultrasound    Approach:  Superolateral  Location:  Knee      Medications:  40 mg methylPREDNISolone 40 MG/ML; 4 mL ROPivacaine 5 MG/ML  Outcome:  Tolerated well, no immediate complications  Procedure discussed: discussed risks, benefits, and alternatives    Consent Given by:  Patient  Timeout: timeout called immediately prior to procedure    Prep: patient was prepped and draped in usual sterile fashion     Ultrasound was used to ensure safe and accurate needle placement and injection. Ultrasound images of the procedure were permanently stored.          Albert Yeo MD [facility]

## 2024-11-14 ENCOUNTER — TELEPHONE (OUTPATIENT)
Dept: INTERNAL MEDICINE | Facility: CLINIC | Age: 74
End: 2024-11-14
Payer: MEDICARE

## 2024-11-15 ENCOUNTER — OFFICE VISIT (OUTPATIENT)
Dept: ORTHOPEDICS | Facility: CLINIC | Age: 74
End: 2024-11-15
Attending: INTERNAL MEDICINE
Payer: MEDICARE

## 2024-11-15 VITALS
HEIGHT: 62 IN | DIASTOLIC BLOOD PRESSURE: 65 MMHG | HEART RATE: 68 BPM | BODY MASS INDEX: 41.44 KG/M2 | WEIGHT: 225.2 LBS | SYSTOLIC BLOOD PRESSURE: 107 MMHG

## 2024-11-15 DIAGNOSIS — M25.561 CHRONIC PAIN OF RIGHT KNEE: ICD-10-CM

## 2024-11-15 DIAGNOSIS — M79.671 CHRONIC PAIN IN RIGHT FOOT: ICD-10-CM

## 2024-11-15 DIAGNOSIS — G89.29 CHRONIC PAIN OF RIGHT KNEE: ICD-10-CM

## 2024-11-15 DIAGNOSIS — G89.29 CHRONIC PAIN IN RIGHT FOOT: ICD-10-CM

## 2024-11-15 DIAGNOSIS — M17.11 PRIMARY OSTEOARTHRITIS OF RIGHT KNEE: Primary | ICD-10-CM

## 2024-11-15 RX ORDER — METHYLPREDNISOLONE ACETATE 40 MG/ML
40 INJECTION, SUSPENSION INTRA-ARTICULAR; INTRALESIONAL; INTRAMUSCULAR; SOFT TISSUE
Status: COMPLETED | OUTPATIENT
Start: 2024-11-15 | End: 2024-11-15

## 2024-11-15 RX ORDER — ROPIVACAINE HYDROCHLORIDE 5 MG/ML
4 INJECTION, SOLUTION EPIDURAL; INFILTRATION; PERINEURAL
Status: COMPLETED | OUTPATIENT
Start: 2024-11-15 | End: 2024-11-15

## 2024-11-15 RX ADMIN — METHYLPREDNISOLONE ACETATE 40 MG: 40 INJECTION, SUSPENSION INTRA-ARTICULAR; INTRALESIONAL; INTRAMUSCULAR; SOFT TISSUE at 11:29

## 2024-11-15 RX ADMIN — ROPIVACAINE HYDROCHLORIDE 4 ML: 5 INJECTION, SOLUTION EPIDURAL; INFILTRATION; PERINEURAL at 11:29

## 2024-11-15 NOTE — LETTER
11/15/2024      Nereyda Rosa  [address] [facility] [facility] [address]      Dear Colleague,    Thank you for referring your patient, Nereyda Rosa, to the [facility] [place]. Please see a copy of my visit note below.    ASSESSMENT & PLAN  Patient Instructions     1. Primary osteoarthritis of right knee    2. Chronic pain of right knee    3. Chronic pain in right foot      -Patient has chronic right knee pain and due to tricompartment osteoarthritis  -X-rays of the right knee shows moderate degenerative changes with joint space narrowing and osteophytes  -Patient tolerated right knee intra-articular cortisone injection today without complications.  Patient was given postprocedure instruction  -Patient will start formal physical therapy and home exercise program to increase strength, stability and balance  -Patient also has chronic right foot pain and deformity due to previous injuries when she was younger  -Patient will be referred to podiatry for further workup and treatment  -Call direct clinic number [[phone]] at any time with questions or concerns.    Albert Yeo MD [facility] Orthopedics and Sports Medicine  [facility]        -----    SUBJECTIVE  Nereyda Rosa is a/an 74 year old female who is seen in consultation at the request of  Nelli Gordillo-*  PCP for evaluation of right knee pain. The patient is seen with their daughter.    Onset: 4 month(s) ago. Patient describes injury as sitting down and reached for cell phone and put weight on knee  Location of Pain: right knee  Rating of Pain at worst: 9/10  Rating of Pain Currently: 8/10  Worsened by: stretch leg  Better with: movement  Treatments tried: rest/activity avoidance, elevation, ice, Tylenol, Ibuprofen, Aleve, and home exercises  Associated symptoms: swelling, numbness, weakness of knee, and locking or catching  Orthopedic history: NO  Relevant surgical history: NO  Social history:  social history: retired    Past Medical History:   Diagnosis Date     Hypertension      Social History     Socioeconomic History     Marital status: Legally    Tobacco Use     Smoking status: Never     Passive exposure: Never     Smokeless tobacco: Never   Vaping Use     Vaping status: Never Used   Substance and Sexual Activity     Alcohol use: Never     Drug use: Never     Sexual activity: Not Currently     Social Drivers of Health     Financial Resource Strain: Low Risk  (6/3/2024)    Financial Resource Strain      Within the past 12 months, have you or your family members you live with been unable to get utilities (heat, electricity) when it was really needed?: No   Food Insecurity: Low Risk  (6/3/2024)    Food Insecurity      Within the past 12 months, did you worry that your food would run out before you got money to buy more?: No      Within the past 12 months, did the food you bought just not last and you didn t have money to get more?: No   Transportation Needs: Low Risk  (6/3/2024)    Transportation Needs      Within the past 12 months, has lack of transportation kept you from medical appointments, getting your medicines, non-medical meetings or appointments, work, or from getting things that you need?: No   Physical Activity: Sufficiently Active (6/3/2024)    Exercise Vital Sign      Days of Exercise per Week: 5 days      Minutes of Exercise per Session: 30 min   Stress: No Stress Concern Present (6/3/2024)    [employer] [place] [employer] Occupational Health - Occupational Stress Questionnaire      Feeling of Stress : Not at all   Social Connections: Moderately Integrated (6/3/2024)    Social Connection and Isolation Panel [NHANES]      Frequency of Communication with Friends and Family: More than three times a week      Frequency of Social Gatherings with Friends and Family: More than three times a week      Attends Orthodox Services: More than 4 times per year      Active Member of Clubs or  "Organizations: Yes      Attends Club or Organization Meetings: More than 4 times per year      Marital Status:    Interpersonal Safety: Low Risk  (6/3/2024)    Interpersonal Safety      Do you feel physically and emotionally safe where you currently live?: Yes      Within the past 12 months, have you been hit, slapped, kicked or otherwise physically hurt by someone?: No      Within the past 12 months, have you been humiliated or emotionally abused in other ways by your partner or ex-partner?: No   Housing Stability: Low Risk  (6/3/2024)    Housing Stability      Do you have housing? : Yes      Are you worried about losing your housing?: No         Patient's past medical, surgical, social, and family histories were reviewed today and no changes are noted.    REVIEW OF SYSTEMS:  10 point ROS is negative other than symptoms noted above in HPI, Past Medical History or as stated below  Constitutional: NEGATIVE for fever, chills, change in weight  Skin: NEGATIVE for worrisome rashes, moles or lesions  GI/: NEGATIVE for bowel or bladder changes  Neuro: NEGATIVE for weakness, dizziness or paresthesias    OBJECTIVE:  /65   Pulse 68   Ht 1.575 m (5' 2\")   Wt 102.2 kg (225 lb 3.2 oz)   LMP  (LMP Unknown)   BMI 41.19 kg/m     General: healthy, alert and in no distress  HEENT: no scleral icterus or conjunctival erythema  Skin: no suspicious lesions or rash. No jaundice.  CV: no pedal edema  Resp: normal respiratory effort without conversational dyspnea   Psych: normal mood and affect  Gait: normal steady gait with appropriate coordination and balance  Neuro: Normal light sensory exam of lower extremity  MSK:  RIGHT KNEE  Inspection:    normal alignment  Palpation:    Tender about the lateral joint line and medial joint line. Remainder of bony and ligamentous landmarks are nontender.    Trace effusion is present    Patellofemoral crepitus is Absent  Range of Motion:     00 extension to 1200 " flexion  Strength:    Quadriceps grossly intact    Extensor mechanism intact  Special Tests:    Positive: none    Negative: MCL/valgus stress (0 & 30 deg), LCL/varus stress (0 & 30 deg), Lachman's, anterior drawer, posterior drawer, Moni's    Independent visualization of the below image:  No results found for this or any previous visit (from the past 24 hours).      Personal review of right knee x-ray performed on 11/8/2024 shows moderate tricompartment arthritis.  Contour irregularity of the lateral tibial plateau with some subchondral cysts.  No acute fracture or dislocation.    Large Joint Injection/Arthocentesis: R knee joint    Date/Time: 11/15/2024 11:29 AM    Performed by: Yeo, Albert, MD  Authorized by: Yeo, Albert, MD    Indications:  Pain and osteoarthritis  Needle Size:  22 G  Guidance: ultrasound    Approach:  Superolateral  Location:  Knee      Medications:  40 mg methylPREDNISolone 40 MG/ML; 4 mL ROPivacaine 5 MG/ML  Outcome:  Tolerated well, no immediate complications  Procedure discussed: discussed risks, benefits, and alternatives    Consent Given by:  Patient  Timeout: timeout called immediately prior to procedure    Prep: patient was prepped and draped in usual sterile fashion     Ultrasound was used to ensure safe and accurate needle placement and injection. Ultrasound images of the procedure were permanently stored.          Albert Yeo MD [facility]      Again, thank you for allowing me to participate in the care of your patient.        Sincerely,        Albert Yeo, MD

## 2024-11-15 NOTE — PATIENT INSTRUCTIONS
1. Primary osteoarthritis of right knee    2. Chronic pain of right knee    3. Chronic pain in right foot      -Patient has chronic right knee pain and due to tricompartment osteoarthritis  -X-rays of the right knee shows moderate degenerative changes with joint space narrowing and osteophytes  -Patient tolerated right knee intra-articular cortisone injection today without complications.  Patient was given postprocedure instruction  -Patient will start formal physical therapy and home exercise program to increase strength, stability and balance  -Patient also has chronic right foot pain and deformity due to previous injuries when she was younger  -Patient will be referred to podiatry for further workup and treatment  -Call direct clinic number [[phone]] at any time with questions or concerns.    Albert Yeo MD [facility]  [facility] [place]

## 2024-11-18 ENCOUNTER — PATIENT OUTREACH (OUTPATIENT)
Dept: CARE COORDINATION | Facility: CLINIC | Age: 74
End: 2024-11-18
Payer: MEDICARE

## 2024-11-19 NOTE — PROGRESS NOTES
PHYSICAL THERAPY EVALUATION  Type of Visit: Evaluation           Subjective         Presenting condition or subjective complaint: (Patient-Rptd) knee  Date of onset: 11/15/24 (Referral date)    Relevant medical history:     Dates & types of surgery:      Prior diagnostic imaging/testing results: (Patient-Rptd) MRI; Bone scan     Prior therapy history for the same diagnosis, illness or injury: (Patient-Rptd) Yes      Pt is a 84 year old female presenting with complaints of bilateral knee pain, R>L. Pt reports that she was reaching for something on a shelf and placed more pressure through the R knee, which increased pain. Pt received an injection on 11/15/24, which decreased pain overall. .    Pt also has a right foot deformity with pain.    The symptoms started 4 months ago and is getting better after injection.    Pt describes the pain as throbbing and achy, and rates the pain as a 9/10 at its worst.    Aggravating Factors: stretching the leg, night, extending the knee    Alleviating Factors: movement, walking    Prior treatments: had PT about 6 months ago    Sleep Quality: improved since injection    Red Flags: none    Pt goals: walking, daily activities, kneeling for praying, squatting     PMH: HTN    Living Environment  Social support: (Patient-Rptd) Alone   Type of home: (Patient-Rptd) [facility]   Stairs to enter the home: (Patient-Rptd) No       Ramp: (Patient-Rptd) No   Stairs inside the home: (Patient-Rptd) No       Help at home: (Patient-Rptd) Self Cares (home health aide/personal care attendant, family, etc); Home management tasks (cooking, cleaning)  Equipment owned: (Patient-Rptd) Straight Cane     Employment: (Patient-Rptd) No    Hobbies/Interests:      Patient goals for therapy: (Patient-Rptd) walking ,SLEEPING,DOING activity     Objective   KNEE:    Observation: forward/rounded shoulder; popping sensation felt with extension of the knee     Gait: trendelenburg B; decreased step/stride B    Effusion  (Sweep Test): not formally assessed but swelling noted inferior of the knee for about 4-5 inches- below the knee    Functional Screen:   - STS: BUE assist, slow to ascend    Patellofemoral Mechanics:   -Abnormal Patellar Posture: difficulty to visualize exact positioning of patella  -Patellar Mobility: min-mod limitations - difficult to move  -Quad Set: min activation B  -SLR: unable B  -Compression: painful      AROM/PROM (* Denotes Pain):    L R   Flexion 120 120   Extension 0 0     End-Feels: empty    Strength (* Denotes Pain):   L R   HIP     Flex     Ext     ABd     KNEE     Flex     Ext       Palpation: TTP medial and lateral jt lines B, R>L; TTP B patella, R>L    Hip Screen: hip flex, IR, ER and ABD WFL    Lumbar Screen: NT    Special Tests:    L R   Knee     Lachman     Anterior Drawer     Posterior Sag     Step Off     Posterior Drawer     Moni - Medial + +   Moni - Lateral + +   Thessaly's     Valgus (0/30)     Varus (0/30)     Hernandez's Sign     Patellar Apprehension     Hip/Lumbar     FABIR     FADER     Janina's     Hamstring 90-90     Da     Slump     SLR          Assessment & Plan   CLINICAL IMPRESSIONS  Medical Diagnosis: Chronic pain of right knee; Primary osteoarthritis of right knee    Treatment Diagnosis: Bilateral knee pain   Impression/Assessment: Patient is a 74 year old female with right knee pain complaints.  The following significant findings have been identified: Pain, Decreased ROM/flexibility, Decreased joint mobility, Decreased strength, Impaired balance, Impaired gait, Impaired muscle performance, and Decreased activity tolerance. These impairments interfere with their ability to perform self care tasks, work tasks, recreational activities, household chores, driving , household mobility, and community mobility as compared to previous level of function.     Clinical Decision Making (Complexity):  Clinical Presentation: Stable/Uncomplicated  Clinical Presentation Rationale:  based on medical and personal factors listed in PT evaluation  Clinical Decision Making (Complexity): Low complexity    PLAN OF CARE  Treatment Interventions:  Modalities: Cryotherapy, Hot Pack, Ultrasound  Interventions: Gait Training, Manual Therapy, Neuromuscular Re-education, Therapeutic Activity, Therapeutic Exercise, Self-Care/Home Management    Long Term Goals     PT Goal 1  Goal Identifier: Kneeling  Goal Description: Pt will be able to kneel 50% of the time without being limited by pain in order to pray  Rationale: to maximize safety and independence with self cares;to maximize safety and independence with performance of ADLs and functional tasks  Target Date: 01/16/25  PT Goal 2  Goal Identifier: Squatting  Goal Description: Pt will be able to perform at least 5 squats (tapping bottom to chair) without a significant increase in pain in order to improve overall function  Rationale: to maximize safety and independence with performance of ADLs and functional tasks;to maximize safety and independence within the home      Frequency of Treatment: 1x/week, decreasing to every other as appropriate  Duration of Treatment: 12 weeks    Recommended Referrals to Other Professionals:  none  Education Assessment:   Learner/Method: Patient    Risks and benefits of evaluation/treatment have been explained.   Patient/Family/caregiver agrees with Plan of Care.     Evaluation Time:     PT Eval, Low Complexity Minutes (43402): 25   Present: Yes: Language: Citizen of the [place], ID Number/Identifier: Rebecca ID: [id number]     Signing Clinician: Lanny Messer, PT        [facility]                                                                                   OUTPATIENT PHYSICAL THERAPY      PLAN OF TREATMENT FOR OUTPATIENT REHABILITATION   Patient's Last Name, First Name, Nereyda Capellan YOB: 1950   Provider's Name   [facility]   Medical Record  No.  [id number]     Onset Date: 11/15/24 (Referral date)  Start of Care Date: 11/21/24     Medical Diagnosis:  Chronic pain of right knee; Primary osteoarthritis of right knee      PT Treatment Diagnosis:  Bilateral knee pain Plan of Treatment  Frequency/Duration: 1x/week, decreasing to every other as appropriate/ 12 weeks    Certification date from 11/21/24 to 02/13/25         See note for plan of treatment details and functional goals     Lanny Messer PT                         I CERTIFY THE NEED FOR THESE SERVICES FURNISHED UNDER        THIS PLAN OF TREATMENT AND WHILE UNDER MY CARE     (Physician attestation of this document indicates review and certification of the therapy plan).              Referring Provider:  Albert Yeo    Initial Assessment  See Epic Evaluation- Start of Care Date: 11/21/24

## 2024-11-20 ENCOUNTER — PATIENT OUTREACH (OUTPATIENT)
Dept: CARE COORDINATION | Facility: CLINIC | Age: 74
End: 2024-11-20
Payer: MEDICARE

## 2024-11-21 ENCOUNTER — THERAPY VISIT (OUTPATIENT)
Dept: PHYSICAL THERAPY | Facility: CLINIC | Age: 74
End: 2024-11-21
Attending: FAMILY MEDICINE
Payer: MEDICARE

## 2024-11-21 DIAGNOSIS — M25.561 CHRONIC PAIN OF RIGHT KNEE: ICD-10-CM

## 2024-11-21 DIAGNOSIS — G89.29 CHRONIC PAIN OF RIGHT KNEE: ICD-10-CM

## 2024-11-21 DIAGNOSIS — M17.11 PRIMARY OSTEOARTHRITIS OF RIGHT KNEE: ICD-10-CM

## 2024-11-21 ASSESSMENT — ACTIVITIES OF DAILY LIVING (ADL)
HOW_WOULD_YOU_RATE_THE_OVERALL_FUNCTION_OF_YOUR_KNEE_DURING_YOUR_USUAL_DAILY_ACTIVITIES?: NEARLY NORMAL
KNEE_ACTIVITY_OF_DAILY_LIVING_SUM: 0
HOW_WOULD_YOU_RATE_THE_OVERALL_FUNCTION_OF_YOUR_KNEE_DURING_YOUR_USUAL_DAILY_ACTIVITIES?: NEARLY NORMAL
PAIN: THE SYMPTOM PREVENTS ME FROM ALL DAILY ACTIVITIES
PLEASE_INDICATE_YOR_PRIMARY_REASON_FOR_REFERRAL_TO_THERAPY:: KNEE
PAIN: THE SYMPTOM PREVENTS ME FROM ALL DAILY ACTIVITIES

## 2024-12-11 ENCOUNTER — PATIENT OUTREACH (OUTPATIENT)
Dept: GERIATRIC MEDICINE | Facility: CLINIC | Age: 74
End: 2024-12-11
Payer: MEDICARE

## 2024-12-11 NOTE — PROGRESS NOTES
[facility] Care Coordination Contact    [facility] called about homemaking not being authorized.  Auth not seen in the file.  Called [facility] and talked to Jeannie. We will put the auth in for 11/1/24.  This care coordinator had talked to [facility] and Rosamaria was still putting in her November time care to [facility].  Asked [facility] to alert this care coordinator when the agencies would change.     Will update the Support Plan with the agency changing to [employer]  NPI # [id number]       Yuly Dotson RN,  PHN  [facility] Care Coordinator  [phone]

## 2024-12-12 ENCOUNTER — VIRTUAL VISIT (OUTPATIENT)
Dept: INTERPRETER SERVICES | Facility: CLINIC | Age: 74
End: 2024-12-12

## 2024-12-12 ENCOUNTER — OFFICE VISIT (OUTPATIENT)
Dept: OBGYN | Facility: CLINIC | Age: 74
End: 2024-12-12
Payer: MEDICARE

## 2024-12-12 VITALS — WEIGHT: 230.5 LBS | BODY MASS INDEX: 42.16 KG/M2 | DIASTOLIC BLOOD PRESSURE: 88 MMHG | SYSTOLIC BLOOD PRESSURE: 140 MMHG

## 2024-12-12 DIAGNOSIS — N85.00 ENDOMETRIAL HYPERPLASIA WITHOUT ATYPIA: Primary | ICD-10-CM

## 2024-12-12 NOTE — PROGRESS NOTES
SUBJECTIVE:  Nereyda Rosa is an 74 year old   woman who presents for   Gyn follow-up exam. She was previously seen for postmenopausal bleeding s/p endometrial biopsy 24  showing.  Endometrium, biopsy:  -Endometrial hyperplasia without atypia. She did 3 months of Megestrol 40 mg po BID.    Here for follow-up biopsy. She felt great on Megace.  Last had spotting towards end of last month         Past Medical History:   Diagnosis Date    Hypertension           Family History   Problem Relation Age of Onset    Glaucoma No family hx of     Macular Degeneration No family hx of        Past Surgical History:   Procedure Laterality Date    COLONOSCOPY N/A 2023    Procedure: COLONOSCOPY;  Surgeon: Fred Evans MD;  Location:  GI       Current Outpatient Medications   Medication Sig Dispense Refill    famotidine (PEPCID) 40 MG tablet TAKE 1 TABLET(40 MG) BY MOUTH DAILY 90 tablet 0    furosemide (LASIX) 20 MG tablet TAKE 1 TABLET(20 MG) BY MOUTH DAILY 30 tablet 0    gabapentin (NEURONTIN) 100 MG capsule Take it at bed time -- 100 mg daily for 3 days, then -- 200 mg daily for 3 days, then -- continue with 300 mg daily 90 capsule 1    megestrol (MEGACE) 40 MG tablet Take 1 tablet (40 mg) by mouth 2 times daily 180 tablet 3    methylPREDNISolone (MEDROL DOSEPAK) 4 MG tablet therapy pack Follow Package Directions 21 tablet 0    naproxen (NAPROSYN) 500 MG tablet TAKE 1 TABLET(500 MG) BY MOUTH TWICE DAILY WITH MEALS 60 tablet 0     No current facility-administered medications for this visit.     Allergies   Allergen Reactions    Chicken Allergy      Rash on shoulders    Food      Allergic to salt per pt       Social History     Tobacco Use    Smoking status: Never     Passive exposure: Never    Smokeless tobacco: Never   Substance Use Topics    Alcohol use: Never       Review Of Systems  Ears/Nose/Throat: negative  Respiratory: No shortness of breath, dyspnea on exertion, cough, or hemoptysis  Cardiovascular:  negative  Gastrointestinal: negative  Genitourinary: See HPI   Constitutional, HEENT, cardiovascular, pulmonary, GI, , musculoskeletal, neuro, skin, endocrine and psych systems are negative, except as otherwise noted.      OBJECTIVE:  BP (!) 140/88   Wt 104.6 kg (230 lb 8 oz)   LMP  (LMP Unknown)   BMI 42.16 kg/m    General appearance: healthy, alert, and no distress  Skin: Skin color, texture, turgor normal. No rashes or lesions.  Ears: negative  Nose/Sinuses: Nares normal. Septum midline. Mucosa normal. No drainage or sinus tenderness.  Oropharynx: Lips, mucosa, and tongue normal. Teeth and gums normal.  Neck: Neck supple. No adenopathy. Thyroid symmetric, normal size,, Carotids without bruits.  Lungs: negative, Percussion normal. Good diaphragmatic excursion. Lungs clear  Heart: negative, PMI normal. No lifts, heaves, or thrills. RRR. No murmurs, clicks gallops or rub  Breasts: negative  Abdomen: Abdomen soft, non-tender. BS normal. No masses, organomegaly  Pelvic: Pelvic examination with no pap/noGonorrhea and Chlamydia  External genitalia normal   and vagina normal rugatted slightly atrophic -  Examination of urethra showed normal no masses, tenderness, scarring  bladder, no masses or tenderness  Cervix no lesions or discharge  Bimanual exam with   Uterus 7 weeks size, mid position, mobile, no tenderness, no descent   Adnexa/parametria normal    Procedure:  Endometrial biopsy    Indication: Endometrial hyperplasia without atypia    Discussed risk of bleeding, infection, uterine perforation, cramping pain.  Pt agreed to proceed with procedure after all questions answered. INFORMED CONSENT OBTAINED   TIME OUT DONE PRIOR TO PROCEDURE:   STATING PROCEDURE NAME AND NEGATIVE URINE PREGNANCY TEST      Speculum placed and cervix visualized.  Cervix cleansed with betadine x 3.  Tenaculum placed on anterior lip of the cervix.  Endometrial biopsy pipelle passed through cervix and uterus sounded to 7 cm.  Biopsy  specimen collected with two passes with return of moderate amount of pink tissue.  Specimen placed in a labeled container and set aside to be sent to pathology.  Tenaculum removed from the cervix and sites hemostatic.  No bleeding noted from cervical os.     Patient tolerated the procedure well.  There were no apparent complications and bleeding was minimal.    She is instructed to use no tampons and have no intercourse for the next 5 days.      ASSESSMENT:  Nereyda Rosa is an 74 year old   woman who presents for   Gyn follow-up exam. She was previously seen for postmenopausal bleeding s/p endometrial biopsy 24  showing.  Endometrium, biopsy:  -Endometrial hyperplasia without atypia. She did 3 months of Megestrol 40 mg po BID.    Here for follow-up biopsy. She felt great on Megace.  Last had spotting towards end of last month       PLAN:    1)  -Endometrial hyperplasia without atypia. Finished 3 months of Megace 40 mg po BID,   Last spotted 1 month ago.   Here for follow-up biopsy:  will update with results. Have previously discussed option of hysterectomy   And she has declined this.     Return in 2 weeks to discuss results, pelvic ultrasound prior to this appointment        Imaging was reviewed in Epic -pelvic us  Tests and documents were reviewed.   Discussion of management or test interpretation   Diagnosis or treatment significantly limited by social determinant - via iPad       Dr. Jovana Pearson, DO    OB/GYN   [facility]

## 2024-12-12 NOTE — PROGRESS NOTES
"Julita : [place]    Chief Complaint   Patient presents with    RECHECK     Medication is working good       Initial LMP  (LMP Unknown)  Estimated body mass index is 41.19 kg/m  as calculated from the following:    Height as of 11/15/24: 1.575 m (5' 2\").    Weight as of 11/15/24: 102.2 kg (225 lb 3.2 oz).  BP completed using cuff size: regular long    Questioned patient about current smoking habits.  Pt. has never smoked.          The following HM Due: NONE    "

## 2024-12-12 NOTE — PATIENT INSTRUCTIONS
Return in 2 weeks for results     Pelvic us to be scheduled     Dr. Jovana Pearson, DO    Obstetrics and Gynecology  [facility] [place] [facility] [place]

## 2024-12-16 ENCOUNTER — PATIENT OUTREACH (OUTPATIENT)
Dept: GERIATRIC MEDICINE | Facility: CLINIC | Age: 74
End: 2024-12-16
Payer: MEDICARE

## 2024-12-16 NOTE — PROGRESS NOTES
Per CC, PCA auth for agency change on [facility] Secure site was missing the month of April for span of 11/1/24 thru 6/30/25.  Units were not right either.  This CMS email [facility] PCA dept to have them review and correct.  Lexii Puckett  Case Management Specialist  [facility]  [phone]

## 2024-12-18 ENCOUNTER — PATIENT OUTREACH (OUTPATIENT)
Dept: CARE COORDINATION | Facility: CLINIC | Age: 74
End: 2024-12-18
Payer: MEDICARE

## 2024-12-24 ENCOUNTER — ANCILLARY PROCEDURE (OUTPATIENT)
Dept: ULTRASOUND IMAGING | Facility: CLINIC | Age: 74
End: 2024-12-24
Attending: FAMILY MEDICINE
Payer: MEDICARE

## 2024-12-24 PROCEDURE — 76856 US EXAM PELVIC COMPLETE: CPT | Performed by: OBSTETRICS & GYNECOLOGY

## 2024-12-24 PROCEDURE — 76830 TRANSVAGINAL US NON-OB: CPT | Performed by: OBSTETRICS & GYNECOLOGY

## 2024-12-30 ENCOUNTER — OFFICE VISIT (OUTPATIENT)
Dept: OBGYN | Facility: CLINIC | Age: 74
End: 2024-12-30
Payer: MEDICARE

## 2024-12-30 VITALS — DIASTOLIC BLOOD PRESSURE: 80 MMHG | BODY MASS INDEX: 41.52 KG/M2 | WEIGHT: 227 LBS | SYSTOLIC BLOOD PRESSURE: 130 MMHG

## 2024-12-30 DIAGNOSIS — Z71.89 COUNSELING AND COORDINATION OF CARE: Primary | ICD-10-CM

## 2024-12-30 PROCEDURE — 99212 OFFICE O/P EST SF 10 MIN: CPT | Performed by: FAMILY MEDICINE

## 2024-12-30 NOTE — PROGRESS NOTES
SUBJECTIVE:  Nereyda Rosa is an 74 year old   woman who presents for   Gyn follow-up exam. She was previously seen for endometrial hyperplasia without atypia, did   Megace x 3 months, then stopped, biopsy 6 months post treatment was performed on 24.   Results showing: A.  Endometrium, biopsy:  -Fragments of disordered proliferative pattern endometrium and blood.  -Scant fragments of benign endocervical tissue and abundant endocervical mucus.        Past Medical History:   Diagnosis Date    Hypertension           Family History   Problem Relation Age of Onset    Glaucoma No family hx of     Macular Degeneration No family hx of        Past Surgical History:   Procedure Laterality Date    COLONOSCOPY N/A 2023    Procedure: COLONOSCOPY;  Surgeon: Fred Evans MD;  Location:  GI       Current Outpatient Medications   Medication Sig Dispense Refill    famotidine (PEPCID) 40 MG tablet TAKE 1 TABLET(40 MG) BY MOUTH DAILY 90 tablet 0    furosemide (LASIX) 20 MG tablet TAKE 1 TABLET(20 MG) BY MOUTH DAILY 30 tablet 0    gabapentin (NEURONTIN) 100 MG capsule Take it at bed time -- 100 mg daily for 3 days, then -- 200 mg daily for 3 days, then -- continue with 300 mg daily 90 capsule 1    megestrol (MEGACE) 40 MG tablet Take 1 tablet (40 mg) by mouth 2 times daily 180 tablet 3    methylPREDNISolone (MEDROL DOSEPAK) 4 MG tablet therapy pack Follow Package Directions 21 tablet 0    naproxen (NAPROSYN) 500 MG tablet TAKE 1 TABLET(500 MG) BY MOUTH TWICE DAILY WITH MEALS 60 tablet 0     No current facility-administered medications for this visit.     Allergies   Allergen Reactions    Chicken Allergy      Rash on shoulders    Food      Allergic to salt per pt       Social History     Tobacco Use    Smoking status: Never     Passive exposure: Never    Smokeless tobacco: Never   Substance Use Topics    Alcohol use: Never       Review Of Systems  Ears/Nose/Throat: negative  Respiratory: No shortness of breath,  dyspnea on exertion, cough, or hemoptysis  Cardiovascular: negative  Gastrointestinal: negative  Genitourinary: See HPI   Constitutional, HEENT, cardiovascular, pulmonary, GI, , musculoskeletal, neuro, skin, endocrine and psych systems are negative, except as otherwise noted.      OBJECTIVE:  /80   Wt 103 kg (227 lb)   LMP  (LMP Unknown)   BMI 41.52 kg/m    General appearance: healthy, alert, and no distress      ASSESSMENT:  Nereyda Rosa is an 74 year old   woman who presents for   Gyn follow-up exam. She was previously seen for endometrial hyperplasia without atypia, did   Megace x 3 months, then stopped, biopsy 6 months post treatment was performed on 24.   Results showing: A.  Endometrium, biopsy:  -Fragments of disordered proliferative pattern endometrium and blood.  -Scant fragments of benign endocervical tissue and abundant endocervical mucus.    PLAN:  Dx: hx of endometrial hyperplasia without atypia  1)  normal endometrial biopsy:  return in 3 months to recheck endometrial biopsy in 2025.   Or with any further bleeding   Also had normal pelvic ultrasound with 4.7 mm endometrium and normal biopsy, previous endometrium with   Hyperplasia was 12 mm  prior to megace treatment.     2)  needs help with care coordination:  care coordination referral sent, patient aware.        Dr. Jovana Pearson, DO    OB/GYN   [facility]

## 2024-12-30 NOTE — PATIENT INSTRUCTIONS
return in 3 months to recheck endometrial biopsy in February 2025  Or with any further bleeding     Dr. Jovana Pearson, DO    Obstetrics and Gynecology  [facility] and [place]

## 2025-01-12 DIAGNOSIS — M48.062 SPINAL STENOSIS OF LUMBAR REGION WITH NEUROGENIC CLAUDICATION: ICD-10-CM

## 2025-01-12 DIAGNOSIS — G89.29 CHRONIC BILATERAL LOW BACK PAIN WITH RIGHT-SIDED SCIATICA: ICD-10-CM

## 2025-01-12 DIAGNOSIS — M54.41 CHRONIC BILATERAL LOW BACK PAIN WITH RIGHT-SIDED SCIATICA: ICD-10-CM

## 2025-01-13 RX ORDER — GABAPENTIN 100 MG/1
300 CAPSULE ORAL AT BEDTIME
Qty: 90 CAPSULE | Refills: 1 | Status: SHIPPED | OUTPATIENT
Start: 2025-01-13

## 2025-02-27 ENCOUNTER — OFFICE VISIT (OUTPATIENT)
Dept: OBGYN | Facility: CLINIC | Age: 75
End: 2025-02-27
Payer: COMMERCIAL

## 2025-02-27 VITALS
WEIGHT: 230 LBS | DIASTOLIC BLOOD PRESSURE: 66 MMHG | SYSTOLIC BLOOD PRESSURE: 120 MMHG | HEIGHT: 62 IN | BODY MASS INDEX: 42.33 KG/M2

## 2025-02-27 DIAGNOSIS — R12 HEARTBURN: ICD-10-CM

## 2025-02-27 DIAGNOSIS — N85.00 ENDOMETRIAL HYPERPLASIA WITHOUT ATYPIA: Primary | ICD-10-CM

## 2025-02-27 DIAGNOSIS — Z13.9 SCREENING FOR CONDITION: ICD-10-CM

## 2025-02-27 NOTE — NURSING NOTE
"Chief Complaint   Patient presents with    Follow Up     3 month follow up/ EMB       Initial /66   Ht 1.575 m (5' 2\")   Wt 104.3 kg (230 lb)   LMP  (LMP Unknown)   BMI 42.07 kg/m   Estimated body mass index is 42.07 kg/m  as calculated from the following:    Height as of this encounter: 1.575 m (5' 2\").    Weight as of this encounter: 104.3 kg (230 lb).  BP completed using cuff size: regular    Questioned patient about current smoking habits.  Pt. has never smoked.          The following HM Due: NONE      Phoebe Lakhani CMA on 2025 at 3:23 PM    "

## 2025-02-27 NOTE — Clinical Note
Please help her schedule follow up ultrasound and office visit for endometrial biopsy may 2025. 
left heel

## 2025-02-27 NOTE — PATIENT INSTRUCTIONS
Return in may for recheck and biopsy     Check pelvic us     Dr. Jovana Pearson, DO    Obstetrics and Gynecology  [facility] [place] [facility] [place]

## 2025-02-27 NOTE — PROGRESS NOTES
SUBJECTIVE:  Nereyda Rosa is an 75 year old   woman who presents for   Gyn follow-up exam. She was previously seen for endometrial hyperplasia without atypia, did   Megace x 3 months, then stopped, biopsy 6 months post treatment was performed on 24.   Results showing: A.  Endometrium, biopsy:  -Fragments of disordered proliferative pattern endometrium and blood.  -Scant fragments of benign endocervical tissue and abundant endocervical mucus.  She was to return for repeat biopsy today, and today she declines the biopsy.   She has continued megace 40 mg po BID since 2024. She had one episode of bleeding   Since her last visit which lasted for a day.   She feels the medication helps her with her throbbing pain she had previously.      Past Medical History:   Diagnosis Date    Hypertension           Family History   Problem Relation Age of Onset    Glaucoma No family hx of     Macular Degeneration No family hx of        Past Surgical History:   Procedure Laterality Date    COLONOSCOPY N/A 2023    Procedure: COLONOSCOPY;  Surgeon: Fred Evans MD;  Location:  GI       Current Outpatient Medications   Medication Sig Dispense Refill    famotidine (PEPCID) 40 MG tablet TAKE 1 TABLET(40 MG) BY MOUTH DAILY 90 tablet 0    furosemide (LASIX) 20 MG tablet TAKE 1 TABLET(20 MG) BY MOUTH DAILY 30 tablet 0    gabapentin (NEURONTIN) 100 MG capsule Take 3 capsules (300 mg) by mouth at bedtime. 90 capsule 1    megestrol (MEGACE) 40 MG tablet Take 1 tablet (40 mg) by mouth 2 times daily 180 tablet 3    methylPREDNISolone (MEDROL DOSEPAK) 4 MG tablet therapy pack Follow Package Directions 21 tablet 0    naproxen (NAPROSYN) 500 MG tablet TAKE 1 TABLET(500 MG) BY MOUTH TWICE DAILY WITH MEALS 60 tablet 0     No current facility-administered medications for this visit.     Allergies   Allergen Reactions    Chicken Allergy      Rash on shoulders    Food      Allergic to salt per pt       Social History     Tobacco  [Contraception/ Emergency Contraception/ Safe Sexual Practices] : contraception, emergency contraception, safe sexual practices "Use    Smoking status: Never     Passive exposure: Never    Smokeless tobacco: Never   Substance Use Topics    Alcohol use: Never       Review Of Systems  Ears/Nose/Throat: negative  Respiratory: No shortness of breath, dyspnea on exertion, cough, or hemoptysis  Cardiovascular: negative  Gastrointestinal: negative  Genitourinary: See HPI   Constitutional, HEENT, cardiovascular, pulmonary, GI, , musculoskeletal, neuro, skin, endocrine and psych systems are negative, except as otherwise noted.      OBJECTIVE:  /66   Ht 1.575 m (5' 2\")   Wt 104.3 kg (230 lb)   LMP  (LMP Unknown)   BMI 42.07 kg/m    General appearance: healthy, alert, and no distress  Skin: Skin color, texture, turgor normal. No rashes or lesions.  Ears: negative  Nose/Sinuses: Nares normal. Septum midline. Mucosa normal. No drainage or sinus tenderness.  Oropharynx: Lips, mucosa, and tongue normal. Teeth and gums normal.  Neck: Neck supple. No adenopathy. Thyroid symmetric, normal size,, Carotids without bruits.  Lungs: negative, Percussion normal. Good diaphragmatic excursion. Lungs clear  Heart: negative, PMI normal. No lifts, heaves, or thrills. RRR. No murmurs, clicks gallops or rub  Abdomen: Abdomen soft, non-tender. BS normal. No masses, organomegaly      ASSESSMENT:  Nereyda Rosa is an 75 year old   woman who presents for   Gyn follow-up exam. She was previously seen for endometrial hyperplasia without atypia, did   Megace x 3 months, then stopped, biopsy 6 months post treatment was performed on 24.   Results showing: A.  Endometrium, biopsy:  -Fragments of disordered proliferative pattern endometrium and blood.  -Scant fragments of benign endocervical tissue and abundant endocervical mucus.  She was to return for repeat biopsy today, and today she declines the biopsy.   She has continued megace 40 mg po BID since 2024. She had one episode of bleeding   Since her last visit which lasted for a day.   She feels the " [STD (testing, results, tx)] : STD (testing, results, tx) medication helps her with her throbbing pain she had previously.    PLAN:  Dx: Endometrial hyperplasia     1)  Endometrial hyperplasia without atypia:  diagnosed 5/2024, with normal biopsy 12/2024.   For surveillance today. She doesn't want biopsy today.  Discussed option for biopsy 5/2025 with   Office or hysteroscopy, she will return in May.  Pelvic us ordered and she agrees to this. Ok to   Continue the megace 40 mg po BID until May 2025.      Guidelines below:    EH without atypia: Postmenopausal patients -- For postmenopausal patients with a diagnosis of EH without atypia who are treated with progestins, we repeat the endometrial biopsy every three to six months for up to one year (algorithm 2).    If biopsy results show:    ?Normal endometrium and:    The patient has no further bleeding, the need for maintenance therapy is based on the presence or absence of risk factors (see 'Goal of management' above). If no risk factors are present, expectant management is reasonable. Endometrial biopsy is advised if abnormal uterine bleeding recurs.    If risk factors are present, progestin therapy (including combined estrogen-progestin contraceptives [MUNIR]) is prescribed indefinitely. (See 'Maintenance therapy' below.)    The patient continues to have postmenopausal bleeding, we perform D&C with hysteroscopy to exclude focal pathology. If the biopsy shows normal endometrium, maintenance progestin therapy is initiated and repeat endometrial biopsy is performed every three to six months for up to one year. (See 'Maintenance therapy' below.)    If any EH (with or without atypia) recurs, hysterectomy is performed.    2)  Notes some shortness of breath,  having some LE edema.  Asks how she will know if she is diabetic.   Hemoglobin A1c   Also discussed that the progesterone can cause swelling to increase as well,   And we hope we can stop the medication when the uterus is better.      3)  Urinary incontinence:  discussed  pesssary and surgery.      4)  having Heartburn:  discussed referral to GI, rx omeprazole   And she asks for a test for heartburn, she is referred to primary doctor as she   Wants to know why she is having heartburn.          Dr. Jovana Pearson, DO    OB/GYN   [facility]

## 2025-03-12 ENCOUNTER — PATIENT OUTREACH (OUTPATIENT)
Dept: GERIATRIC MEDICINE | Facility: CLINIC | Age: 75
End: 2025-03-12
Payer: MEDICARE

## 2025-03-12 NOTE — PROGRESS NOTES
[facility] Care Coordination Contact      [facility] Health Plan or Product Change    CC received notification that member's health plan or health plan product changed from [facility] to are [facility] effective 2/1/25.  CC contacted Rosamaria, member's daughter and discussed change and face-to-face visit was offered. Rosamaria, daughter declined need for home visit. [facility] member and no changes noted. Able to access previous assessment and support plan information in [employer]. MMIS entry 3.13.25 with product change and saved to file.   Required referral authorization information communicated to CMS: No- notified CMS to complete Product change tasks.   Writer reviewed the following with member:  ER visits: Yes -  [facility]  Hospitalizations: No  TCU stays: No  Significant health status changes: No  Falls/Injuries: Yes: 9/9/25 was seen in the ED post fall  ADL/IADL changes: No  Changes in services: No    Follow-Up Plan: Member informed of future contact, plan to f/u with member with at next regularly scheduled contact.  Contact information shared with member and family, encouraged member to call with any questions or concerns.  Yuly Dotson RN,  PHN  [facility] Care Coordinator  [phone]

## 2025-04-03 ENCOUNTER — TELEPHONE (OUTPATIENT)
Dept: INTERNAL MEDICINE | Facility: CLINIC | Age: 75
End: 2025-04-03
Payer: COMMERCIAL

## 2025-04-03 DIAGNOSIS — N39.46 MIXED STRESS AND URGE URINARY INCONTINENCE: Primary | ICD-10-CM

## 2025-04-04 ENCOUNTER — ANCILLARY PROCEDURE (OUTPATIENT)
Dept: ULTRASOUND IMAGING | Facility: CLINIC | Age: 75
End: 2025-04-04
Attending: FAMILY MEDICINE
Payer: COMMERCIAL

## 2025-04-04 DIAGNOSIS — N85.00 ENDOMETRIAL HYPERPLASIA WITHOUT ATYPIA: ICD-10-CM

## 2025-04-04 PROCEDURE — 76856 US EXAM PELVIC COMPLETE: CPT | Performed by: OBSTETRICS & GYNECOLOGY

## 2025-04-04 PROCEDURE — 76830 TRANSVAGINAL US NON-OB: CPT | Performed by: OBSTETRICS & GYNECOLOGY

## 2025-04-07 ENCOUNTER — MEDICAL CORRESPONDENCE (OUTPATIENT)
Dept: HEALTH INFORMATION MANAGEMENT | Facility: CLINIC | Age: 75
End: 2025-04-07

## 2025-04-07 PROBLEM — N39.46 MIXED STRESS AND URGE URINARY INCONTINENCE: Status: ACTIVE | Noted: 2025-04-07

## 2025-05-01 ENCOUNTER — PATIENT OUTREACH (OUTPATIENT)
Dept: GERIATRIC MEDICINE | Facility: CLINIC | Age: 75
End: 2025-05-01

## 2025-05-01 ENCOUNTER — OFFICE VISIT (OUTPATIENT)
Dept: OBGYN | Facility: CLINIC | Age: 75
End: 2025-05-01
Payer: COMMERCIAL

## 2025-05-01 VITALS
DIASTOLIC BLOOD PRESSURE: 80 MMHG | HEIGHT: 62 IN | BODY MASS INDEX: 42.16 KG/M2 | SYSTOLIC BLOOD PRESSURE: 132 MMHG | WEIGHT: 229.1 LBS

## 2025-05-01 DIAGNOSIS — N95.0 POSTMENOPAUSAL BLEEDING: ICD-10-CM

## 2025-05-01 DIAGNOSIS — N85.00 ENDOMETRIAL HYPERPLASIA WITHOUT ATYPIA: Primary | ICD-10-CM

## 2025-05-01 RX ORDER — INCONTINENCE PAD,LINER,DISP
1 EACH MISCELLANEOUS DAILY PRN
Qty: 90 EACH | Refills: 11 | Status: SHIPPED | OUTPATIENT
Start: 2025-05-01

## 2025-05-01 NOTE — PROGRESS NOTES
SUBJECTIVE:  Nereyda Rosa is an 75 year old   woman who presents for   Gyn follow-up exam. She was previously seen for endometrial hyperplasia.     She was previously seen for endometrial hyperplasia without atypia, did   Megace x 3 months, then stopped, biopsy 6 months post treatment was performed on 24.   Results showing: A.  Endometrium, biopsy:  -Fragments of disordered proliferative pattern endometrium and blood.  -Scant fragments of benign endocervical tissue and abundant endocervical mucus.  She was to return for repeat biopsy last visit, and then d declines the biopsy.   She has continued megace 40 mg po BID since 2024, and stopped this 3 months prior.   Since her last visit she had one day of bleeding.    A recent pelvic ultrasound showed thickened endometrium of 6.8 mm     She feels the medication helps her with her throbbing pain she had previously.        Past Medical History:   Diagnosis Date    Hypertension           Family History   Problem Relation Age of Onset    Glaucoma No family hx of     Macular Degeneration No family hx of        Past Surgical History:   Procedure Laterality Date    COLONOSCOPY N/A 2023    Procedure: COLONOSCOPY;  Surgeon: Fred Evans MD;  Location:  GI       Current Outpatient Medications   Medication Sig Dispense Refill    famotidine (PEPCID) 40 MG tablet TAKE 1 TABLET(40 MG) BY MOUTH DAILY 90 tablet 0    furosemide (LASIX) 20 MG tablet TAKE 1 TABLET(20 MG) BY MOUTH DAILY 30 tablet 0    gabapentin (NEURONTIN) 100 MG capsule Take 3 capsules (300 mg) by mouth at bedtime. 90 capsule 1    megestrol (MEGACE) 40 MG tablet Take 1 tablet (40 mg) by mouth 2 times daily 180 tablet 3    methylPREDNISolone (MEDROL DOSEPAK) 4 MG tablet therapy pack Follow Package Directions 21 tablet 0    naproxen (NAPROSYN) 500 MG tablet TAKE 1 TABLET(500 MG) BY MOUTH TWICE DAILY WITH MEALS 60 tablet 0     No current facility-administered medications for this visit.  "    Allergies   Allergen Reactions    Chicken Allergy      Rash on shoulders    Food      Allergic to salt per pt       Social History     Tobacco Use    Smoking status: Never     Passive exposure: Never    Smokeless tobacco: Never   Substance Use Topics    Alcohol use: Never       Review Of Systems  Ears/Nose/Throat: negative  Respiratory: No shortness of breath, dyspnea on exertion, cough, or hemoptysis  Cardiovascular: negative  Gastrointestinal: negative  Genitourinary: See HPI   Constitutional, HEENT, cardiovascular, pulmonary, GI, , musculoskeletal, neuro, skin, endocrine and psych systems are negative, except as otherwise noted.      OBJECTIVE:  /80   Ht 1.575 m (5' 2\")   Wt 103.9 kg (229 lb 1.6 oz)   LMP  (LMP Unknown)   BMI 41.90 kg/m    General appearance: healthy, alert, and no distress  Skin: Skin color, texture, turgor normal. No rashes or lesions.  Ears: negative  Nose/Sinuses: Nares normal. Septum midline. Mucosa normal. No drainage or sinus tenderness.  Oropharynx: Lips, mucosa, and tongue normal. Teeth and gums normal.  Neck: Neck supple. No adenopathy. Thyroid symmetric, normal size,, Carotids without bruits.  Lungs: negative, Percussion normal. Good diaphragmatic excursion. Lungs clear  Pelvic: Pelvic examination withno pap/noGonorrhea and Chlamydia  External genitalia normal   and vagina normal rugatted and atrophic -  Examination of urethra showed normal no masses, tenderness, scarring  bladder, no masses or tenderness  Cervix no lesions or discharge    Procedure:  Endometrial biopsy    Indication: Menometrorrhagia with age >35                   Endometrial cells on pap and age >40    Discussed risk of bleeding, infection, uterine perforation, cramping pain.  Pt agreed to proceed with procedure after all questions answered. INFORMED CONSENT OBTAINED   TIME OUT DONE PRIOR TO PROCEDURE:   STATING PROCEDURE NAME AND NEGATIVE URINE PREGNANCY TEST      Speculum placed and cervix " visualized.  Cervix cleansed with betadine x 3.  Tenaculum placed on anterior lip of the cervix.  Endometrial biopsy pipelle passed through cervix and uterus sounded to 6 cm.  Biopsy specimen collected with one pass with return of moderate amount of pink tissue.  Specimen placed in a labeled container and set aside to be sent to pathology.  Tenaculum removed from the cervix and sites hemostatic.  No bleeding noted from cervical os.     Patient tolerated the procedure well.  There were no apparent complications and bleeding was minimal.    She is instructed to use no tampons and have no intercourse for the next 5 days.    ASSESSMENT:  Nereyda Rosa is an 75 year old   woman who presents for   Gyn follow-up exam. She was previously seen for endometrial hyperplasia.     She was previously seen for endometrial hyperplasia without atypia, did   Megace x 3 months, then stopped, biopsy 6 months post treatment was performed on 24.   Results showing: A.  Endometrium, biopsy:  -Fragments of disordered proliferative pattern endometrium and blood.  -Scant fragments of benign endocervical tissue and abundant endocervical mucus.  She was to return for repeat biopsy last visit, and then shd declines the biopsy.   She has continued megace 40 mg po BID since 2024, and stopped this Februrary.      Since her last visit she had one day of bleeding.    A recent pelvic ultrasound showed thickened endometrium of 6.8 mm     She feels the medication helps her with her throbbing pain she had previously.      PLAN:  Dx: endometrial hyperplasia   1)  Endometrial hyperplasia, diagnosed on 24, resolved on biopsy on 2024.   She took megace from 2024 until 2025, She is worried because she didn't have the medication   After 9 months.  Refills were available but it appears that she did not know how to arrange refills.     About 2 days ago she noted a small amount of bleeding.      I would honestly recommend repeat biopsy  today in office or to arrange hysteroscopy dilation and curettage   For complete diagnosis. The best recommendation as below due to continued bleeding is to schedule a   Hysteroscopy dilation and curettage to rule out other pathology and place IUD for long-term therapy   As she has decline hysterectomy which would be curative.      We did again discuss the option of hysterectomy as the progression from endometrial hyperplasia can return and   If returning can progress to endometrial cancer in up to 2% of patients, even if treated with careful following and   The IUD is placed.      After this discussion, she would like a hysterectomy. Offered biopsy today versus biopsy at the time of the surgeyr,   If positive for cancer would not do hysterectomy, but wake up and then plan hysterectomy with oncology.    She desires to complete the biopsy today and plan for hysteroscopy .  Discussed ovarian preservation versus   Removal and she agrees with removal of tubes and ovaries    Guidelines below:    EH without atypia: Postmenopausal patients -- For postmenopausal patients with a diagnosis of EH without atypia who are treated with progestins, we repeat the endometrial biopsy every three to six months for up to one year (algorithm 2).     If biopsy results show:     ?Normal endometrium and:     The patient has no further bleeding, the need for maintenance therapy is based on the presence or absence of risk factors (see 'Goal of management' above). If no risk factors are present, expectant management is reasonable. Endometrial biopsy is advised if abnormal uterine bleeding recurs.     If risk factors are present, progestin therapy (including combined estrogen-progestin contraceptives [MUNIR]) is prescribed indefinitely. (See 'Maintenance therapy' below.)     The patient continues to have postmenopausal bleeding, we perform D&C with hysteroscopy to exclude focal pathology. If the biopsy shows normal endometrium, maintenance  progestin therapy is initiated and repeat endometrial biopsy is performed every three to six months for up to one year. (See 'Maintenance therapy' below.)     If any EH (with or without atypia) recurs, hysterectomy is performed.    2)  Stress urinary incontinence:  see Dr. Garcia for consultation for possible sling procedure.     3)  LE edema:  declines referral to cardiology        Dr. Jovana Pearson, DO    OB/GYN   [facility]

## 2025-05-01 NOTE — Clinical Note
[phone] please call this number and her daughter will help her mother as well organize this surgery.

## 2025-05-01 NOTE — Clinical Note
Can you also add possible sling procedure to consent with Dr. Garcia, and help her get a timely appointment for urinary incontinence with Dr. Garcia. For joint procedure?

## 2025-05-01 NOTE — PROGRESS NOTES
[facility] Care Coordination Contact    Called adult daughter Rosamaria  to schedule annual HRA home visit. HRA has been scheduled for 5/5/25 at 2:00.      Yuly Dotson RN,  PHN  [facility] Care Coordinator  [phone]

## 2025-05-01 NOTE — PROGRESS NOTES
"[facility]  [id number]    Chief Complaint   Patient presents with    RECHECK     Follow up pelvic US 4/4/25     initial Ht 1.575 m (5' 2\")   Wt 103.9 kg (229 lb 1.6 oz)   LMP  (LMP Unknown)   BMI 41.90 kg/m   Estimated body mass index is 41.9 kg/m  as calculated from the following:    Height as of this encounter: 1.575 m (5' 2\").    Weight as of this encounter: 103.9 kg (229 lb 1.6 oz).  BP completed using cuff size regular    Felicia Avendano CMA on 5/1/2025 at 1:26 PM    "

## 2025-05-01 NOTE — PATIENT INSTRUCTIONS
Help patient schedule consultation with Dr. Garcia     Will notfiy of the result         Dr. Jovana Pearson, DO    Obstetrics and Gynecology  [facility]

## 2025-05-05 ENCOUNTER — PATIENT OUTREACH (OUTPATIENT)
Dept: GERIATRIC MEDICINE | Facility: CLINIC | Age: 75
End: 2025-05-05
Payer: COMMERCIAL

## 2025-05-05 ASSESSMENT — LIFESTYLE VARIABLES
SKIP TO QUESTIONS 9-10: 1
AUDIT-C TOTAL SCORE: 0

## 2025-05-05 ASSESSMENT — ACTIVITIES OF DAILY LIVING (ADL): DEPENDENT_IADLS:: CLEANING;COOKING;LAUNDRY;SHOPPING;MEAL PREPARATION;MONEY MANAGEMENT;TRANSPORTATION

## 2025-05-05 NOTE — Clinical Note
Hi Dr Conner,  The annual assessment was completed in the home.  No changes in services. Thanks,  SVETA Emery

## 2025-05-07 ENCOUNTER — RESULTS FOLLOW-UP (OUTPATIENT)
Dept: OBGYN | Facility: CLINIC | Age: 75
End: 2025-05-07

## 2025-05-14 NOTE — PROGRESS NOTES
[facility] Care Coordination Contact    [facility] Home Visit Assessment     Home visit for Health Risk Assessment with Nereyda Rosa completed on May 5, 2025    Type of residence:: Apartment - handicap accessible  Current living arrangement:: I live alone     Assessment completed with:: Patient, Care Team Member, Children, Other    Current Care Plan  Member currently receiving the following home care services:     Member currently receiving the following community resources: Day Care, DME, PCA, Housekeeping/Chore Agency, Transportation Services      Medication Review  Medication reconciliation completed in Epic: Yes  Medication set-up & administration: Family/informal caregiver sets up weekly.  Self-administers medications.  Medication Risk Assessment Medication (1 or more, place referral to MTM): N/A: No risk factors identified  MTM Referral Placed: No: No risk factors idenified    Mental/Behavioral Health   Depression Screening: NA     Mental health DX:: No        Falls Assessment:   Fallen 2 or more times in the past year?: (!) Yes   Any fall with injury in the past year?: No    ADL/IADL Dependencies:   Dependent ADLs:: Incontinence, Ambulation-cane, Bathing, Dressing, Grooming  Dependent IADLs:: Cleaning, Cooking, Laundry, Shopping, Meal Preparation, Money Management, Transportation    Health Plan sponsored benefits: [employer]: Shared information regarding [employer]. Reviewed preventative health screening and health plan supplemental benefits/incentives. Reviewed medication disposal form and transition of care member handout.    CFSS Assessment completed at visit: CFSS increased to 3.0 hours a day.      Elderly Waiver Eligibility: Yes-will continue on EW    Care Plan & Recommendations: Continue with PCA, adult day care and HM    See [place] for detailed assessment information.    Follow-Up Plan: Member informed of future contact, plan to f/u with member with a 6 month  telephone assessment.  Contact information shared with member and family, encouraged member to call with any questions or concerns at any time.    Bath care continuum providers: Please see Snapshot and Care Management Flowsheets for Specific details of care plan.    This CC note routed to PCP, Nelli Conner.    Yuly Dotson RN,  PHN  [facility] Care Coordinator  [phone]

## 2025-05-15 ENCOUNTER — PATIENT OUTREACH (OUTPATIENT)
Dept: GERIATRIC MEDICINE | Facility: CLINIC | Age: 75
End: 2025-05-15
Payer: COMMERCIAL

## 2025-05-15 NOTE — PROGRESS NOTES
[facility] Care Coordination Contact        [facility]:  Emailed required CFSS documents to [facility].  Mailed member supplemental summary chart and assessment summary letter.     Joanie Han    Care Management Specialist   [facility]  [phone]

## 2025-06-14 ENCOUNTER — RESULTS FOLLOW-UP (OUTPATIENT)
Dept: INTERNAL MEDICINE | Facility: CLINIC | Age: 75
End: 2025-06-14
Payer: COMMERCIAL

## 2025-06-16 ENCOUNTER — PATIENT OUTREACH (OUTPATIENT)
Dept: CARE COORDINATION | Facility: CLINIC | Age: 75
End: 2025-06-16
Payer: COMMERCIAL

## 2025-06-16 NOTE — RESULT ENCOUNTER NOTE
Attempt #1   Call placed to patient with [facility]  Sukhi ID# [id number]. Left message requesting a returned call to clinic.

## 2025-06-18 ENCOUNTER — PATIENT OUTREACH (OUTPATIENT)
Dept: CARE COORDINATION | Facility: CLINIC | Age: 75
End: 2025-06-18
Payer: COMMERCIAL

## 2025-06-18 ENCOUNTER — RESULTS FOLLOW-UP (OUTPATIENT)
Dept: OBGYN | Facility: CLINIC | Age: 75
End: 2025-06-18

## 2025-07-08 ENCOUNTER — PATIENT OUTREACH (OUTPATIENT)
Dept: GERIATRIC MEDICINE | Facility: CLINIC | Age: 75
End: 2025-07-08
Payer: COMMERCIAL

## 2025-07-08 NOTE — LETTER
July 8, 2025       YANDEL LAY  [address] [facility] [address]      Dear Yandel,    At [facility], we re dedicated to improving your health and wellness. Enclosed is the Support Plan developed with you on 05/05/2025. Please review the Support Plan carefully.    As a reminder, during your visit we talked about:   Ways to manage your physical and mental health   Using health care to maintain and improve your health    Your preventive care needs      Remember to contact your care coordinator if you:   Are hospitalized or plan to be hospitalized    Have a fall     Have a change in your physical or mental health   Need help finding support or services    If you have questions or don t agree with your Support Plan, call me at [phone]. You can also call me if your needs change. TTY users call the [place] [employer] at 786 or [phone] (nhljqa-lq-rrmczm relay service).    Sincerely,       Yuly Pink RN, PHN  [phone]  [email or web address]                [id number] accepted     (06/2024)                [address] [place] [address]  [phone]  fax [phone]  [facility]

## 2025-07-08 NOTE — PROGRESS NOTES
[facility] Care Coordination Contact    Received after visit chart from care coordinator.  Completed following tasks: Mailed copy of support plan to member, Mailed Safe Medication Disposal , Mailed Transition of Care Member Handout, Submitted referrals/auths for adc, transporation, hmk, Support Plan left open  for [place]choices update question., and Updated services in Database.   and Provider Signature - No Support Plan Shared:  Member indicates that they do not want their support plan shared with any EW providers.    Tasked to CMS on 07/08/2025    Joanie Han    Care Management Specialist   [facility]  [phone]

## 2025-07-23 ENCOUNTER — PATIENT OUTREACH (OUTPATIENT)
Dept: GERIATRIC MEDICINE | Facility: CLINIC | Age: 75
End: 2025-07-23
Payer: COMMERCIAL

## 2025-07-24 NOTE — PROGRESS NOTES
[facility] Care Coordination Contact    Notifed by Rosamaria, daughter that [facility] that supports the PCA/RN Supervision and HM is no longer able to provide services effective 7/4/25.     [facility] would like to be employed by [facility] [employer] [facility]. Called [facility] and this agency is able to take the member. Nereyda has been with this agency in the past.  [place] Support [place] updated with the changes.     Yuly Dotson RN,  PHN  [facility] Care Coordinator  [phone]

## 2025-07-29 ENCOUNTER — PATIENT OUTREACH (OUTPATIENT)
Dept: GERIATRIC MEDICINE | Facility: CLINIC | Age: 75
End: 2025-07-29
Payer: COMMERCIAL

## 2025-07-29 NOTE — PROGRESS NOTES
[facility] Care Coordination Contact    Received after visit chart from care coordinator.  Completed following tasks: Mailed copy of support plan to member, Submitted referrals/auths for hmk, and Updated services in Database.  [facility]:  Emailed required CFSS documents to [facility]. Provider Signature - No Support Plan Shared:  Member indicates that they do not want their support plan shared with any EW providers. and Member Signature - Support Plan Change:  Per CC, member has made a change to their support plan.  Care Plan Change Letter mailed to member for signature with a self-addressed return envelope.    Joanie Han    Care Management Specialist   [facility]  [phone]

## 2025-07-29 NOTE — LETTER
July 29, 2025      YANDEL LAY  [address] [facility] [address]      Dear Yandel,    Recently, we talked about a change to a new service provider. Your support plan has been updated with this information. We also discussed how important it is to share your support plan and instructions with your provider. You've chosen to send no support plan information to this provider.    Please sign and return the enclosed form to show that you agree with your updated support plan.      __________________________________________________________________    If you have questions, call me at [phone]. (TTY users call 061 or [phone]). If you get my voicemail, leave a message with your name, phone number and the best time for me to reach you.      Sincerely,      Yuly Pink RN, PHN  [phone]  [email or web address]    Enclosure: Updated Support Plan                                                                                                                                                                                                                                        <[id number]>                                           (05/2025)      [address], [place] [address]  [phone]  fax [phone]  [facility]

## 2025-08-07 ENCOUNTER — OFFICE VISIT (OUTPATIENT)
Dept: OBGYN | Facility: CLINIC | Age: 75
End: 2025-08-07
Payer: COMMERCIAL

## 2025-08-07 VITALS — BODY MASS INDEX: 40.28 KG/M2 | SYSTOLIC BLOOD PRESSURE: 122 MMHG | WEIGHT: 222 LBS | DIASTOLIC BLOOD PRESSURE: 70 MMHG

## 2025-08-07 DIAGNOSIS — R60.0 BILATERAL LEG EDEMA: Primary | ICD-10-CM

## 2025-08-11 ENCOUNTER — PATIENT OUTREACH (OUTPATIENT)
Dept: CARE COORDINATION | Facility: CLINIC | Age: 75
End: 2025-08-11
Payer: COMMERCIAL

## (undated) DEVICE — KIT ENDO TURNOVER/PROCEDURE W/CLEAN A SCOPE LINERS 103888

## (undated) RX ORDER — FENTANYL CITRATE 50 UG/ML
INJECTION, SOLUTION INTRAMUSCULAR; INTRAVENOUS
Status: DISPENSED
Start: 2023-05-19